# Patient Record
Sex: MALE | Race: WHITE | NOT HISPANIC OR LATINO | Employment: FULL TIME | URBAN - METROPOLITAN AREA
[De-identification: names, ages, dates, MRNs, and addresses within clinical notes are randomized per-mention and may not be internally consistent; named-entity substitution may affect disease eponyms.]

---

## 2018-07-12 ENCOUNTER — OFFICE VISIT (OUTPATIENT)
Dept: FAMILY MEDICINE | Facility: CLINIC | Age: 38
End: 2018-07-12
Payer: COMMERCIAL

## 2018-07-12 VITALS
BODY MASS INDEX: 23.86 KG/M2 | RESPIRATION RATE: 16 BRPM | HEIGHT: 72 IN | TEMPERATURE: 97.5 F | DIASTOLIC BLOOD PRESSURE: 79 MMHG | HEART RATE: 76 BPM | SYSTOLIC BLOOD PRESSURE: 126 MMHG | WEIGHT: 176.2 LBS | OXYGEN SATURATION: 92 %

## 2018-07-12 DIAGNOSIS — F33.41 RECURRENT MAJOR DEPRESSIVE DISORDER, IN PARTIAL REMISSION (H): ICD-10-CM

## 2018-07-12 DIAGNOSIS — Z00.00 ROUTINE GENERAL MEDICAL EXAMINATION AT A HEALTH CARE FACILITY: Primary | ICD-10-CM

## 2018-07-12 DIAGNOSIS — C18.7 MALIGNANT NEOPLASM OF SIGMOID COLON (H): ICD-10-CM

## 2018-07-12 DIAGNOSIS — E23.0 HYPOPITUITARISM (H): ICD-10-CM

## 2018-07-12 DIAGNOSIS — E22.0 ACROMEGALY (H): ICD-10-CM

## 2018-07-12 DIAGNOSIS — D64.9 ANEMIA, UNSPECIFIED TYPE: ICD-10-CM

## 2018-07-12 RX ORDER — DEXAMETHASONE 1 MG
TABLET ORAL
Qty: 90 TABLET | Refills: 3 | Status: SHIPPED | OUTPATIENT
Start: 2018-07-12 | End: 2019-02-08

## 2018-07-12 RX ORDER — TESTOSTERONE CYPIONATE 200 MG/ML
100 INJECTION, SOLUTION INTRAMUSCULAR WEEKLY
Qty: 10 ML | Refills: 1 | Status: SHIPPED | OUTPATIENT
Start: 2018-07-12 | End: 2019-02-08

## 2018-07-12 RX ORDER — LEVOTHYROXINE SODIUM 125 UG/1
125 TABLET ORAL DAILY
Qty: 90 TABLET | Refills: 3 | COMMUNITY
Start: 2018-07-12 | End: 2019-02-08

## 2018-07-12 NOTE — PROGRESS NOTES
Male Physical Note          HPI    Alex is a 38 year old man who presents to the clinic today to establish care. He is moving back to [place] after living in [place] for several years. He and his wife had a daughter about 6 months ago and he is employed as an .     Past medical history significant for:  1. acromegaly diagnosed in 2009  2. transphenoidal hypophysectomy performed in 2009  3. gamma knife pituitary radiosurgery in 2010  4. colon cancer diagnosed in 2011  5. sigmoid colectomy in 2011  6. second hypophysectomy in early 2016.     He is currently taking a hormone replacement regimen including dexamethasone, levothyroxine, growth hormone, and testosterone. In 2012 he was diagnosed with an irregular heart rhythm and possible regurgitation with possible cardiomegaly which was thought to be related to his diagnosis of acromegaly. After significant work up for possible heart disease his physicians told him no intervention was necessary and it was not a significant threat to Alex's health.    Last lab work performed 3 months ago.  Plans to do labs every 6 months.     Additional Previous Medical Concerns  History of cholesterol elevation.    Colonoscopy - need repeat colonoscopy in 3 - 5 years.  Last one was 1 year ago.   Irregular cardiac rhythm, cardiomegaly, possible valve disease  Anemia of unknown etiology, does not recall receiving workup for diagnosis of anemia  Recurrent major depressive disorder    Concerns today: would like to refill some medications, would like to establish a local endocrinologist nasrin at a center of excellence.    Patient Active Problem List   Diagnosis     Hypopituitarism (H)     Recurrent major depressive disorder, in partial remission (H)     Acromegaly (H)     Malignant neoplasm of sigmoid colon (H)     Routine general medical examination at a health care facility     Anemia       Past Medical History:   Diagnosis Date     Cancer (H)     Colon     Thyroid  disease     Acromegaly       Previous Medical Care   [facility]: Neurosurgery for partial transphenoidal hypophysectomy, gamma knife procedure  [facility]: Gastroenterology and Colorectal surgery for colectomy  [facility]: Original endocrinologist  [facility]: Endocrinology, Gastroenterology  [facility]: neurology, neurosurgery, gastroenterology complete transphenoidal hypophysectomy     Family History   Problem Relation Age of Onset     Rheumatoid Arthritis Mother      Rheumatoid Arthritis Other      Diabetes No family hx of      Coronary Artery Disease No family hx of      Hypertension No family hx of      Hyperlipidemia No family hx of      Cerebrovascular Disease No family hx of      Breast Cancer No family hx of      Colon Cancer No family hx of      Prostate Cancer No family hx of      Other Cancer No family hx of      Depression No family hx of      Anxiety Disorder No family hx of      Mental Illness No family hx of      Substance Abuse No family hx of      Anesthesia Reaction No family hx of      Asthma No family hx of      Osteoperosis No family hx of      Genetic Disorder No family hx of      Thyroid Disease No family hx of      Obesity No family hx of      Unknown/Adopted No family hx of               Review of Systems:     Review of Systems:    Skin: negative for, pigmentation, acne, rash, itching, bruising  Eyes: negative for, double vision, glaucoma, cataracts, redness, tearing, itching  Ears/Nose/Throat: negative for, nasal congestion, postnasal drainage, tinnitus, vertigo; some recent sore throat related to cold  Respiratory: No dyspnea on exertion, cough, or hemoptysis. Some shortness of breath related to cold.  Cardiovascular: negative for, tachycardia, irregular heart beat, chest pain and exertional chest pain or pressure  Gastrointestinal: negative for, melena, hematochezia, constipation and diarrhea  Genitourinary: negative for, urgency, hesitancy and  hematuria  Musculoskeletal: negative for, joint pain, joint swelling and joint stiffness  Neurologic: negative for, migraine headaches, headaches, syncope, seizures and memory problems  Psychiatric:noting some slight increase in MDD symptoms  Hematologic/Lymphatic/Immunologic: negative for, anemia, chills, fever and night sweats  Endocrine: negative for, thyroid disorder, heat intolerance, night sweats and diabetes; endorses some polydipsia and cold intolerance--states that he may drink more than others; Sometimes feel extremely tired during the day    Sleep:   Do you snore most or the night (as reported by a family member)? No  Do you feel sleepy or extremely tired during most of the day? Sometimes           Social History     Social History     Social History     Marital status:      Spouse name: N/A     Number of children: N/A     Years of education: N/A     Occupational History     Not on file.     Social History Main Topics     Smoking status: Never Smoker     Smokeless tobacco: Never Used     Alcohol use Yes      Comment: Only so often     Drug use: No     Sexual activity: Yes     Partners: Female     Birth control/ protection: None     Other Topics Concern     Not on file     Social History Narrative     No narrative on file       Marital Status:  Who lives in your household? Lives with wife and daughter    Has anyone hurt you physically, for example by pushing, hitting, slapping or kicking you or forcing you to have sex? Denies  Do you feel threatened or controlled by a partner, ex-partner or anyone in your life? Denies    Sexual Health     Sexual concerns: Yes   STI History: Neg      Recommended Screening     Cholesterol Level (>46 yo or at risk):  Plan at next lab draw  Colon CA Screening (>50-75 ):  Needs Q 3 years - next one is due in 2 years.     Has been 1 year since cholesterol checked; would be interested to know numbers           Physical Exam:     /79  Pulse 76  Temp 97.5  F  "(36.4  C) (Oral)  Resp 16  Ht 6' 0.44\" (184 cm)  Wt 176 lb 3.2 oz (79.9 kg)  SpO2 92%  BMI 23.61 kg/m2    BMI= There is no height or weight on file to calculate BMI.  GENERAL: healthy, alert and no distress  EYES: Eyes grossly normal to inspection, extraocular movements - intact, and PERRL  HENT: Mouth- no ulcers, no lesions. Frontal bossing, slightly enlarged nose and prominent chin  NECK: no tenderness, no adenopathy, no asymmetry, no masses, no stiffness; thyroid- normal to palpation  RESP: lungs clear to auscultation - no rales, no rhonchi, no wheezes  CV: regular rates and rhythm, normal S1 S2, no S3 or S4 and no murmur, no click or rub -  ABDOMEN: soft, no tenderness, no  hepatosplenomegaly, no masses, normal bowel sounds  MS: extremities- large hands, no edema  PSYCH: Alert and oriented times 3; speech- coherent , normal rate and volume; able to articulate logical thoughts, able to abstract reason, no tangential thoughts, no hallucinations or delusions, affect- normal    Assessment and Plan      Alex was seen today for physical and new patient.    Diagnoses and all orders for this visit:    Routine general medical examination at a health care facility    Hypopituitarism (H)  -     testosterone cypionate (DEPOTESTOTERONE) 200 MG/ML injection; Inject 0.5 mLs (100 mg) into the muscle once a week  -     dexamethasone (DECADRON) 1 MG tablet; 0.5 mg daily plus an additional 5 doses per month as needed     Alex is a 38 year old male  who presents to [facility] today to establish care. He has extensive past medical history involving acromegaly diagnosed in 2009, multiple surgeries to treat primary hyperpituitarism, and a diagnosis of colon cancer (single neoplastic polyp with lymphatic spread) in 2011 treated by sigmoidectomy. 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 He was also diagnosed " with abnormal cardiac arrhythmia and mild cardiomegaly thought to be related to elevated growth hormone. Alex is knowledgeable about his condition, very health literate, and has proven capable of managing his health conditions well. Today he provided [facility] with his medical history, received a routine physical and health evaluation, and we refilled several of his testosterone and dexamethasone prescriptions.    Note - his T levels may be a bit lower (mood and some ED) - did not address today but can do so over MyChart if ongoing concern.     Plan  1. Referral to local endocrinologist  Alex expressed interest in being referred to endocrinology, requesting a local practice of strong repute especially a center of excellence. Plan to research possible options and provide referral.    2. Work with Alex to obtain previous medical records from previous medical providers listed above    3. Return for routine labs in 3 months    Options for treatment and follow-up care were reviewed with the patient. Ivan D Welander and/or guardian engaged in the decision making process and verbalized understanding of the options discussed and agreed with the final plan.    Kavon Warren MS3    Preceptor Attestation:  I was present with the medical student who participated in the service and in the documentation of this note. I have verified the history and personally performed the physical exam and medical decision making. I have verified the content of the note, which accurately reflects my assessment of the patient and the plan of care.   Supervising Physician:  Sabine Stinson MD

## 2018-07-12 NOTE — PATIENT INSTRUCTIONS
Here is the plan from today's visit    1. Routine general medical examination at a health care facility  Plan to add on lipid test in the future    2. Hypopituitarism (H)  I will get back to you on recommendations for endocrine referral locally    - testosterone cypionate (DEPOTESTOTERONE) 200 MG/ML injection; Inject 0.5 mLs (100 mg) into the muscle once a week  Dispense: 10 mL; Refill: 1  - levothyroxine (SYNTHROID/LEVOTHROID) 125 MCG tablet; Take 1 tablet (125 mcg) by mouth daily  Dispense: 90 tablet; Refill: 3  - Somatropin 5 MG/2ML SOLN; Inject 0.5 mg Subcutaneous daily  Dispense: 2 mL; Refill: 3  - dexamethasone (DECADRON) 1 MG tablet; 0.5 mg daily plus an additional 5 doses per month as needed  Dispense: 90 tablet; Refill: 3      Please call or return to clinic if your symptoms don't go away.    Follow up plan  As needed     Thank you for coming to [facility] today.  Lab Testing:  **If you had lab testing today and your results are reassuring or normal they will be mailed to you or sent through [facility] within 7 days.   **If the lab tests need quick action we will call you with the results.  The phone number we will call with results is # [phone] (home) [phone] (work). If this is not the best number please call our clinic and change the number.  Medication Refills:  If you need any refills please call your pharmacy and they will contact us.   If you need to  your refill at a new pharmacy, please contact the new pharmacy directly. The new pharmacy will help you get your medications transferred faster.   Scheduling:  If you have any concerns about today's visit or wish to schedule another appointment please call our office during normal business hours [phone] (8-5:00 M-F)  If a referral was made to a [facility] and you don't get a call from central scheduling please call [phone].  If a Mammogram was ordered for you at [facility] call [phone] to  schedule or change your appointment.  If you had an XRay/CT/Ultrasound/MRI ordered the number is [phone] to schedule or change your radiology appointment.   Medical Concerns:  If you have urgent medical concerns please call [phone] at any time of the day.  If you have a medical emergency please call [phone].  Preventive Health Recommendations  Male Ages 26 - 39    Yearly exam:             See your health care provider every year in order to  o   Review health changes.   o   Discuss preventive care.    o   Review your medicines if your doctor has prescribed any.    You should be tested each year for STDs (sexually transmitted diseases), if you re at risk.     After age 35, talk to your provider about cholesterol testing. If you are at risk for heart disease, have your cholesterol tested at least every 5 years.     If you are at risk for diabetes, you should have a diabetes test (fasting glucose).  Shots: Get a flu shot each year. Get a tetanus shot every 10 years.     Nutrition:    Eat at least 5 servings of fruits and vegetables daily.     Eat whole-grain bread, whole-wheat pasta and brown rice instead of white grains and rice.     Get adequate Calcium and Vitamin D.     Lifestyle    Exercise for at least 150 minutes a week (30 minutes a day, 5 days a week). This will help you control your weight and prevent disease.     Limit alcohol to one drink per day.     No smoking.     Wear sunscreen to prevent skin cancer.     See your dentist every six months for an exam and cleaning.

## 2018-07-12 NOTE — MR AVS SNAPSHOT
After Visit Summary   7/12/2018    Ivan D Welander    MRN: [id number]           Patient Information     Date Of Birth          1980        Visit Information        Provider Department      7/12/2018 3:40 PM Sabine Stinson MD [facility]        Today's Diagnoses     Routine general medical examination at a health care facility    -  1    Hypopituitarism (H)          Care Instructions    Here is the plan from today's visit    1. Routine general medical examination at a health care facility  Plan to add on lipid test in the future    2. Hypopituitarism (H)  I will get back to you on recommendations for endocrine referral locally    - testosterone cypionate (DEPOTESTOTERONE) 200 MG/ML injection; Inject 0.5 mLs (100 mg) into the muscle once a week  Dispense: 10 mL; Refill: 1  - levothyroxine (SYNTHROID/LEVOTHROID) 125 MCG tablet; Take 1 tablet (125 mcg) by mouth daily  Dispense: 90 tablet; Refill: 3  - Somatropin 5 MG/2ML SOLN; Inject 0.5 mg Subcutaneous daily  Dispense: 2 mL; Refill: 3  - dexamethasone (DECADRON) 1 MG tablet; 0.5 mg daily plus an additional 5 doses per month as needed  Dispense: 90 tablet; Refill: 3      Please call or return to clinic if your symptoms don't go away.    Follow up plan  As needed     Thank you for coming to [facility] today.  Lab Testing:  **If you had lab testing today and your results are reassuring or normal they will be mailed to you or sent through [facility] within 7 days.   **If the lab tests need quick action we will call you with the results.  The phone number we will call with results is # [phone] (home) [phone] (work). If this is not the best number please call our clinic and change the number.  Medication Refills:  If you need any refills please call your pharmacy and they will contact us.   If you need to  your refill at a new pharmacy, please contact the new pharmacy directly. The new pharmacy will help you get your  medications transferred faster.   Scheduling:  If you have any concerns about today's visit or wish to schedule another appointment please call our office during normal business hours [phone] (8-5:00 M-F)  If a referral was made to a [facility] and you don't get a call from central scheduling please call [phone].  If a Mammogram was ordered for you at [facility] call [phone] to schedule or change your appointment.  If you had an XRay/CT/Ultrasound/MRI ordered the number is [phone] to schedule or change your radiology appointment.   Medical Concerns:  If you have urgent medical concerns please call [phone] at any time of the day.  If you have a medical emergency please call 180.  Preventive Health Recommendations  Male Ages 26 - 39    Yearly exam:             See your health care provider every year in order to  o   Review health changes.   o   Discuss preventive care.    o   Review your medicines if your doctor has prescribed any.    You should be tested each year for STDs (sexually transmitted diseases), if you re at risk.     After age 35, talk to your provider about cholesterol testing. If you are at risk for heart disease, have your cholesterol tested at least every 5 years.     If you are at risk for diabetes, you should have a diabetes test (fasting glucose).  Shots: Get a flu shot each year. Get a tetanus shot every 10 years.     Nutrition:    Eat at least 5 servings of fruits and vegetables daily.     Eat whole-grain bread, whole-wheat pasta and brown rice instead of white grains and rice.     Get adequate Calcium and Vitamin D.     Lifestyle    Exercise for at least 150 minutes a week (30 minutes a day, 5 days a week). This will help you control your weight and prevent disease.     Limit alcohol to one drink per day.     No smoking.     Wear sunscreen to prevent skin cancer.     See your dentist every six months for an exam and cleaning.              "Follow-ups after your visit        Who to contact     Please call your clinic at [phone] to:    Ask questions about your health    Make or cancel appointments    Discuss your medicines    Learn about your test results    Speak to your doctor            Additional Information About Your Visit        [facility] Information     [employer] is an electronic gateway that provides easy, online access to your medical records. With [employer], you can request a clinic appointment, read your test results, renew a prescription or communicate with your care team.     To sign up for [employer] visit the website at [email or web address]   You will be asked to enter the access code listed below, as well as some personal information. Please follow the directions to create your username and password.     Your access code is: [email or web address]  Expires: 10/10/2018  3:30 PM     Your access code will  in 90 days. If you need help or a new code, please contact your [facility] or call [phone] for assistance.        Care EveryWhere ID     This is your Care EveryWhere ID. This could be used by other organizations to access your [place] medical records  [id number]        Your Vitals Were     Pulse Temperature Respirations Height Pulse Oximetry BMI (Body Mass Index)    76 97.5  F (36.4  C) (Oral) 16 6' 0.44\" (184 cm) 92% 23.61 kg/m2       Blood Pressure from Last 3 Encounters:   18 126/79    Weight from Last 3 Encounters:   18 176 lb 3.2 oz (79.9 kg)              Today, you had the following     No orders found for display         Today's Medication Changes          These changes are accurate as of 18  5:20 PM.  If you have any questions, ask your nurse or doctor.               Start taking these medicines.        Dose/Directions    dexamethasone 1 MG tablet   Commonly known as:  DECADRON   Used for:  Hypopituitarism (H)   Started by:  Sabine Stinson MD        0.5 mg daily plus an " additional 5 doses per month as needed   Quantity:  90 tablet   Refills:  3       testosterone cypionate 200 MG/ML injection   Commonly known as:  DEPOTESTOTERONE   Used for:  Hypopituitarism (H)   Started by:  Sabine Stinson MD        Dose:  100 mg   Inject 0.5 mLs (100 mg) into the muscle once a week   Quantity:  10 mL   Refills:  1            Where to get your medicines      These medications were sent to [facility] - [address]  [address] [place] [id number]     Phone:  [phone]     dexamethasone 1 MG tablet         Some of these will need a paper prescription and others can be bought over the counter.  Ask your nurse if you have questions.     Bring a paper prescription for each of these medications     testosterone cypionate 200 MG/ML injection                Primary Care Provider Fax #    Physician No Ref-Primary [phone]       No address on file        Equal Access to Services     [facility] ANTONIO [facility] : Mike Trent, ky morales, dave sánchez, isidoro taylor . So [facility] [place] [phone].    ATENCIÓN: Si habla español, tiene a shin disposición servicios gratuitos de asistencia lingüística. Llame al [phone].    We comply with applicable federal civil rights laws and [place] laws. We do not discriminate on the basis of race, color, national origin, age, disability, sex, sexual orientation, or gender identity.            Thank you!     Thank you for choosing [facility]  for your care. Our goal is always to provide you with excellent care. Hearing back from our patients is one way we can continue to improve our services. Please take a few minutes to complete the written survey that you may receive in the mail after your visit with us. Thank you!             Your Updated Medication List - Protect others around you: Learn how to safely use, store and throw away your medicines  at www.disposemymeds.org.          This list is accurate as of 7/12/18  5:20 PM.  Always use your most recent med list.                   Brand Name Dispense Instructions for use Diagnosis    dexamethasone 1 MG tablet    DECADRON    90 tablet    0.5 mg daily plus an additional 5 doses per month as needed    Hypopituitarism (H)       levothyroxine 125 MCG tablet    SYNTHROID/LEVOTHROID    90 tablet    Take 1 tablet (125 mcg) by mouth daily    Hypopituitarism (H)       Somatropin 5 MG/2ML Soln     2 mL    Inject 0.5 mg Subcutaneous daily    Hypopituitarism (H)       testosterone cypionate 200 MG/ML injection    DEPOTESTOTERONE    10 mL    Inject 0.5 mLs (100 mg) into the muscle once a week    Hypopituitarism (H)

## 2018-07-12 NOTE — Clinical Note
Hello:  I have a new patient who has acromegaly and now pan hypopituitarism, who is looking for a specialist in acromegaly.  I offered the U, which he is fine with but was hoping to work with the provider in the [facility] who is most knowledgeable in this area. Who would you recommend?  Love to keep him in the system but also want to honor his request. Thanks,  Elsa Stinson MD

## 2018-07-13 ASSESSMENT — PATIENT HEALTH QUESTIONNAIRE - PHQ9: SUM OF ALL RESPONSES TO PHQ QUESTIONS 1-9: 4

## 2018-07-13 NOTE — PROGRESS NOTES
Preceptor Attestation:  I was present with the medical student who participated in the service and in the documentation of this note. I have verified the history and personally performed the physical exam and medical decision making. I have verified the content of the note, which accurately reflects my assessment of the patient and the plan of care.   Supervising Physician:  Sabine Stinson MD

## 2018-07-25 ENCOUNTER — MYC MEDICAL ADVICE (OUTPATIENT)
Dept: FAMILY MEDICINE | Facility: CLINIC | Age: 38
End: 2018-07-25

## 2018-07-25 DIAGNOSIS — E23.0 HYPOPITUITARISM (H): Primary | ICD-10-CM

## 2018-07-25 DIAGNOSIS — E22.0 ACROMEGALY (H): ICD-10-CM

## 2018-08-02 ENCOUNTER — TELEPHONE (OUTPATIENT)
Dept: ENDOCRINOLOGY | Facility: CLINIC | Age: 38
End: 2018-08-02

## 2018-08-02 NOTE — TELEPHONE ENCOUNTER
Health Call Center    Phone Message    May a detailed message be left on voicemail: yes    Reason for Call: Other: Pt is being referred by Dr. Sabine Stinson at [facility] to Dr. Tejada for acromegally and hypopituitarism. Referral is in the system. Please review and contact pt to schedule. Thanks!     Action Taken: Message routed to:  [facility] Endocrine

## 2018-10-02 ENCOUNTER — OFFICE VISIT (OUTPATIENT)
Dept: ENDOCRINOLOGY | Facility: CLINIC | Age: 38
End: 2018-10-02
Payer: COMMERCIAL

## 2018-10-02 VITALS
SYSTOLIC BLOOD PRESSURE: 112 MMHG | HEART RATE: 64 BPM | BODY MASS INDEX: 24.81 KG/M2 | DIASTOLIC BLOOD PRESSURE: 73 MMHG | WEIGHT: 185.2 LBS

## 2018-10-02 DIAGNOSIS — E03.8 SECONDARY HYPOTHYROIDISM: ICD-10-CM

## 2018-10-02 DIAGNOSIS — D49.7 PITUITARY TUMOR: ICD-10-CM

## 2018-10-02 DIAGNOSIS — E22.0 ACROMEGALY (H): ICD-10-CM

## 2018-10-02 DIAGNOSIS — E27.49 SECONDARY ADRENAL INSUFFICIENCY (H): ICD-10-CM

## 2018-10-02 DIAGNOSIS — E23.0 HYPOPITUITARISM (H): ICD-10-CM

## 2018-10-02 DIAGNOSIS — E29.1 SECONDARY MALE HYPOGONADISM: ICD-10-CM

## 2018-10-02 DIAGNOSIS — E23.0 HYPOPITUITARISM (H): Primary | ICD-10-CM

## 2018-10-02 LAB
ALBUMIN SERPL-MCNC: 4 G/DL (ref 3.4–5)
ALP SERPL-CCNC: 45 U/L (ref 40–150)
ALT SERPL W P-5'-P-CCNC: 23 U/L (ref 0–70)
ANION GAP SERPL CALCULATED.3IONS-SCNC: 6 MMOL/L (ref 3–14)
AST SERPL W P-5'-P-CCNC: 26 U/L (ref 0–45)
BILIRUB SERPL-MCNC: 0.9 MG/DL (ref 0.2–1.3)
BUN SERPL-MCNC: 8 MG/DL (ref 7–30)
CALCIUM SERPL-MCNC: 8.8 MG/DL (ref 8.5–10.1)
CHLORIDE SERPL-SCNC: 99 MMOL/L (ref 94–109)
CO2 SERPL-SCNC: 29 MMOL/L (ref 20–32)
CREAT SERPL-MCNC: 0.95 MG/DL (ref 0.66–1.25)
GFR SERPL CREATININE-BSD FRML MDRD: 88 ML/MIN/1.7M2
GLUCOSE SERPL-MCNC: 79 MG/DL (ref 70–99)
POTASSIUM SERPL-SCNC: 3.6 MMOL/L (ref 3.4–5.3)
PROLACTIN SERPL-MCNC: 11 UG/L (ref 2–18)
PROT SERPL-MCNC: 7.5 G/DL (ref 6.8–8.8)
SODIUM SERPL-SCNC: 135 MMOL/L (ref 133–144)
T4 FREE SERPL-MCNC: 1.17 NG/DL (ref 0.76–1.46)
TSH SERPL DL<=0.005 MIU/L-ACNC: 0.01 MU/L (ref 0.4–4)

## 2018-10-02 ASSESSMENT — PAIN SCALES - GENERAL: PAINLEVEL: NO PAIN (0)

## 2018-10-02 NOTE — LETTER
10/2/2018       RE: Ivan D Welander  [address]  [facility] [address]     Dear Colleague,    Thank you for referring your patient, Ivan D Welander, to the [facility] ENDOCRINOLOGY at [facility]. Please see a copy of my visit note below.                                                                               - Endocrinology Initial Consultation -    Reason for visit/consult:  Data Unavailable    Primary care provider: No Ref-Primary, Physician    HPI: A 37 yo male with acromegaly, here for the care transition due to interstate move.  He moved in 7/2018 from [place], originally from [place].    He was diagnosed Acromegaly in 2007, during the regular physical check up in [facility], and screened and diagnosed. Patient recalls that his pituitary tumor was 1 inch or so.   In 12/2009 first surgery was done at [facility], RT was done after that in [place] 6/2010.   2/2011 he underwent colonoscopy for Colon cancer screening, and results positive for malignancy, then underwent sigmoid resection.     Patient moved to [place], residual tumor detected. his second surgery 12/2015 at [facility] for residual tumor.     Patient has been taking Dexamethasone, testosterone, GH for his panhypopituitarism started since 2010. He used to be on Hydrocortisone, however tends to forgets afternoon dose, then switched to dexamethasone 0.5 mg in [place] due to missing dose.   Levothyroxine 125 mcg. Testosterone injection every week 100 mg.   Growth hormone (neutropin) 0.5 mg daily since 2017.   In [place] 2018, since he was missing dexamethasone for busy baby care, he went to [facility] for sickness.     Last visit 3/20/2018 [facility] Neurosurgery Dr. Parada visit for GHD.     4/2017 colonoscoy: OK,     Past Medical/Surgical History:  Past Medical History:   Diagnosis Date     Cancer (H)     Colon     Thyroid disease     Acromegaly     Past Surgical History:   Procedure Laterality Date      APPENDECTOMY       COLONOSCOPY       GI SURGERY      sigmoid colectomy     HEAD & NECK SURGERY      transphenoidal hypophysectomy       Allergies:  No Known Allergies    Current Medications   Current Outpatient Prescriptions   Medication     dexamethasone (DECADRON) 1 MG tablet     levothyroxine (SYNTHROID/LEVOTHROID) 125 MCG tablet     Sildenafil Citrate (VIAGRA PO)     Somatropin 5 MG/2ML SOLN     testosterone cypionate (DEPOTESTOTERONE) 200 MG/ML injection     No current facility-administered medications for this visit.        Family History:  Family History   Problem Relation Age of Onset     Rheumatoid Arthritis Mother      Rheumatoid Arthritis Other      Diabetes No family hx of      Coronary Artery Disease No family hx of      Hypertension No family hx of      Hyperlipidemia No family hx of      Cerebrovascular Disease No family hx of      Breast Cancer No family hx of      Colon Cancer No family hx of      Prostate Cancer No family hx of      Other Cancer No family hx of      Depression No family hx of      Anxiety Disorder No family hx of      Mental Illness No family hx of      Substance Abuse No family hx of      Anesthesia Reaction No family hx of      Asthma No family hx of      Osteoporosis No family hx of      Genetic Disorder No family hx of      Thyroid Disease No family hx of      Obesity No family hx of      Unknown/Adopted No family hx of    Maternal uncle: some acromegalic feature    Social History:  Social History   Substance Use Topics     Smoking status: Never Smoker     Smokeless tobacco: Never Used     Alcohol use Yes      Comment: Only so often       ROS:  Full review of systems taken with the help of the intake sheet. Otherwise a complete 14 point review of systems was taken and is negative unless stated in the history above.      Physical Exam:   Blood pressure 112/73, pulse 64, weight 84 kg (185 lb 3.2 oz).  General: well appearing, no acute distress, pleasant and conversant,   Mental  Status/neuro: alert and oriented  Face: symmetrical, normal facial color  Eyes: anicteric, PERRL, no proptosis or lid lag  Neck: suppler, no lymphadenopahty  Thyroid: normal size and texture, no nodule palpable, no bruits  Hand/finger: enlarged (per patient was bigger before and his ring recently loosened and fell)  Heart: regular rhythm, S1S2, no murmur appreciated  Chest: no gynecomastia, skin tags several +  Lung: clear to auscultation bilaterally  Abdomen: soft, NT/ND, no hepatomegaly  Legs: no swelling or edema      Labs : I reviewed data from epic and extract and summarize the pertinent data here.               1/2018  TSH   0.02  Cortisol 5.1    MRI Brain: I personally reviewed the original images and agree with the below reports.   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 Significant interval debulking of predominantly inferior and right sellar enhancing soft tissue.  Residual enhancing sellar tissue which may reflect residual normal pituitary gland or tumor. Infundibulum is midline. No new areas of suspicious enhancing tissue.    Unremarkable appearance of the optic nerves and chiasm.      Visualized portions of the brain are unremarkable.        Assessment and Plan  38 year old male with acromegaly s/p TSS twice, RT, long term panhypopituitarism    # Acromegaly  Post TSS twice, last MRI 2/2016 post surgical changes.  We will repeat MRI since has been almost 3 years. If no residual then every 3 years or so.   - MRI pituitary in  6 months    # Secondary adrenal insufficiency  Discussed hydrocortisone is preferable to dexamethasone and discussed glucocorticoid and mineral corticoid.   Since we have some changes of other meds, stay Dexamethasone 0.5 mg daily for now the will try hydrocortisone 10/5 mg in the new future.     # Secondary hypothryoidism  - Blood work today (TSH, free T4)  - Meanwhile continue LT4 125 mcg for now    # Secondary hypogonadism    - Blood work in the morning testosterone, LH, FSH  - Meanwhile continue curernt testosterone injection 100 mg every week     # GH deficiecy  Patient mentioned GH started 1 year ago which he did not feel difference (such as energy level), in addition he has history of colon cancer, which can be against GH therapy. Discussed with patient, will try to hold GH injection this time and observe.   - Hold GH for 1-2 motnh    - RTC with me in 6 month then once a year.     I spent 45 minutes with this patient face to face and explained the conditions and plans (more than 50% of time was counseling/coordination of care, expalined difference between dexamethasone and hydrocortisone, discussed blood work plan and MRI follow up, discussed association colon cancer and GH) . The patient understood and is satisfied with today's visit. Return to clinic with me in 6 months.         Damaris Tejada MD  Staff Physician  Endocrinology and Metabolism  License: [id number]    Again, thank you for allowing me to participate in the care of your patient.      Sincerely,    Damaris Tejada MD

## 2018-10-02 NOTE — LETTER
Date:October 4, 2018      Patient was self referred, no letter generated. Do not send.        [facility] Health Information

## 2018-10-02 NOTE — MR AVS SNAPSHOT
After Visit Summary   10/2/2018    Ivan D Welander    MRN: [id number]           Patient Information     Date Of Birth          1980        Visit Information        Provider Department      10/2/2018 12:00 PM Damaris Tejada MD [facility] Endocrinology        Today's Diagnoses     Hypopituitarism (H)    -  1    Acromegaly (H)        Secondary male hypogonadism        Secondary adrenal insufficiency (H)        Secondary hypothyroidism        Pituitary tumor           Follow-ups after your visit        Follow-up notes from your care team     Return in about 6 months (around 4/2/2019).      Your next 10 appointments already scheduled     Apr 02, 2019  1:00 PM CDT   (Arrive by 12:45 PM)   RETURN ENDOCRINE with Damaris Tejada MD   [facility] Endocrinology ([facility])    [address] [place] [id number] [phone]              Future tests that were ordered for you today     Open Future Orders        Priority Expected Expires Ordered    Testosterone total Routine  10/2/2019 10/2/2018    Lutropin Routine  10/2/2019 10/2/2018    Follicle stimulating hormone Routine  10/2/2019 10/2/2018    MRI Brain-PITUITARY  w & w/o contrast Routine  4/30/2019 10/2/2018            Who to contact     Please call your clinic at [phone] to:    Ask questions about your health    Make or cancel appointments    Discuss your medicines    Learn about your test results    Speak to your doctor            Additional Information About Your Visit        MyChart Information     [employer] gives you secure access to your electronic health record. If you see a primary care provider, you can also send messages to your care team and make appointments. If you have questions, please call your primary care clinic.  If you do not have a primary care provider, please call [phone] and they will assist you.      aWhere is an electronic gateway that provides easy, online access to your  medical records. With [facility], you can request a clinic appointment, read your test results, renew a prescription or communicate with your care team.     To access your existing account, please contact your [facility] or call [phone] for assistance.        Care EveryWhere ID     This is your Care EveryWhere ID. This could be used by other organizations to access your [facility] medical records  [id number]        Your Vitals Were     Pulse BMI (Body Mass Index)                64 24.81 kg/m2           Blood Pressure from Last 3 Encounters:   10/02/18 112/73   07/12/18 126/79    Weight from Last 3 Encounters:   10/02/18 84 kg (185 lb 3.2 oz)   07/12/18 79.9 kg (176 lb 3.2 oz)               Primary Care Provider Fax #    Physician No Ref-Primary [phone]       No address on file        Equal Access to Services     JOSÉ ANTONIO WOODRUFF : Hadii pako Trent, waaxnik luqadaha, qaybta kaalmada gonzalo, isidoro taylor . So [facility] [phone].    ATENCIÓN: Si habla español, tiene a shin disposición servicios gratuitos de asistencia lingüística. Llame al [phone].    We comply with applicable federal civil rights laws and [place] laws. We do not discriminate on the basis of race, color, national origin, age, disability, sex, sexual orientation, or gender identity.            Thank you!     Thank you for choosing [facility]  for your care. Our goal is always to provide you with excellent care. Hearing back from our patients is one way we can continue to improve our services. Please take a few minutes to complete the written survey that you may receive in the mail after your visit with us. Thank you!             Your Updated Medication List - Protect others around you: Learn how to safely use, store and throw away your medicines at www.disposemymeds.org.          This list is accurate as of 10/2/18  3:26 PM.  Always use your most recent med  list.                   Brand Name Dispense Instructions for use Diagnosis    dexamethasone 1 MG tablet    DECADRON    90 tablet    0.5 mg daily plus an additional 5 doses per month as needed    Hypopituitarism (H)       levothyroxine 125 MCG tablet    SYNTHROID/LEVOTHROID    90 tablet    Take 1 tablet (125 mcg) by mouth daily    Hypopituitarism (H)       Somatropin 5 MG/2ML Soln     2 mL    Inject 0.5 mg Subcutaneous daily    Hypopituitarism (H)       testosterone cypionate 200 MG/ML injection    DEPOTESTOSTERONE    10 mL    Inject 0.5 mLs (100 mg) into the muscle once a week    Hypopituitarism (H)       VIAGRA PO

## 2018-10-02 NOTE — PROGRESS NOTES
- Endocrinology Initial Consultation -    Reason for visit/consult:  Data Unavailable    Primary care provider: No Ref-Primary, Physician    HPI: A 37 yo male with acromegaly, here for the care transition due to interstate move.  He moved in 7/2018 from [place], originally from [place].    He was diagnosed Acromegaly in 2007, during the regular physical check up in [facility], and screened and diagnosed. Patient recalls that his pituitary tumor was 1 inch or so.   In 12/2009 first surgery was done at [facility], RT was done after that in [place] 6/2010.   2/2011 he underwent colonoscopy for Colon cancer screening, and results positive for malignancy, then underwent sigmoid resection.     Patient moved to [place], residual tumor detected. his second surgery 12/2015 at [facility] for residual tumor.     Patient has been taking Dexamethasone, testosterone, GH for his panhypopituitarism started since 2010. He used to be on Hydrocortisone, however tends to forgets afternoon dose, then switched to dexamethasone 0.5 mg in [place] due to missing dose.   Levothyroxine 125 mcg. Testosterone injection every week 100 mg.   Growth hormone (neutropin) 0.5 mg daily since 2017.   [facility] since he was missing dexamethasone for busy baby care, he went to [facility] for sickness.     Last visit 3/20/2018 [facility] Neurosurgery Dr. Parada visit for GHD.     4/2017 colonoscoy: OK,     Past Medical/Surgical History:  Past Medical History:   Diagnosis Date     Cancer (H)     Colon     Thyroid disease     Acromegaly     Past Surgical History:   Procedure Laterality Date     APPENDECTOMY       COLONOSCOPY       GI SURGERY      sigmoid colectomy     HEAD & NECK SURGERY      transphenoidal hypophysectomy       Allergies:  No Known Allergies    Current Medications   Current Outpatient Prescriptions   Medication     dexamethasone (DECADRON) 1 MG tablet      levothyroxine (SYNTHROID/LEVOTHROID) 125 MCG tablet     Sildenafil Citrate (VIAGRA PO)     Somatropin 5 MG/2ML SOLN     testosterone cypionate (DEPOTESTOTERONE) 200 MG/ML injection     No current facility-administered medications for this visit.        Family History:  Family History   Problem Relation Age of Onset     Rheumatoid Arthritis Mother      Rheumatoid Arthritis Other      Diabetes No family hx of      Coronary Artery Disease No family hx of      Hypertension No family hx of      Hyperlipidemia No family hx of      Cerebrovascular Disease No family hx of      Breast Cancer No family hx of      Colon Cancer No family hx of      Prostate Cancer No family hx of      Other Cancer No family hx of      Depression No family hx of      Anxiety Disorder No family hx of      Mental Illness No family hx of      Substance Abuse No family hx of      Anesthesia Reaction No family hx of      Asthma No family hx of      Osteoporosis No family hx of      Genetic Disorder No family hx of      Thyroid Disease No family hx of      Obesity No family hx of      Unknown/Adopted No family hx of    Maternal uncle: some acromegalic feature    Social History:  Social History   Substance Use Topics     Smoking status: Never Smoker     Smokeless tobacco: Never Used     Alcohol use Yes      Comment: Only so often       ROS:  Full review of systems taken with the help of the intake sheet. Otherwise a complete 14 point review of systems was taken and is negative unless stated in the history above.      Physical Exam:   Blood pressure 112/73, pulse 64, weight 84 kg (185 lb 3.2 oz).  General: well appearing, no acute distress, pleasant and conversant,   Mental Status/neuro: alert and oriented  Face: symmetrical, normal facial color  Eyes: anicteric, PERRL, no proptosis or lid lag  Neck: suppler, no lymphadenopahty  Thyroid: normal size and texture, no nodule palpable, no bruits  Hand/finger: enlarged (per patient was bigger before and his  ring recently loosened and fell)  Heart: regular rhythm, S1S2, no murmur appreciated  Chest: no gynecomastia, skin tags several +  Lung: clear to auscultation bilaterally  Abdomen: soft, NT/ND, no hepatomegaly  Legs: no swelling or edema      Labs : I reviewed data from epic and extract and summarize the pertinent data here.               1/2018  TSH   0.02  Cortisol 5.1    MRI Brain: I personally reviewed the original images and agree with the below reports.   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 Significant interval debulking of predominantly inferior and right sellar enhancing soft tissue.  Residual enhancing sellar tissue which may reflect residual normal pituitary gland or tumor. Infundibulum is midline. No new areas of suspicious enhancing tissue.    Unremarkable appearance of the optic nerves and chiasm.      Visualized portions of the brain are unremarkable.        Assessment and Plan  38 year old male with acromegaly s/p TSS twice, RT, long term panhypopituitarism    # Acromegaly  Post TSS twice, last MRI 2/2016 post surgical changes.  We will repeat MRI since has been almost 3 years. If no residual then every 3 years or so.   - MRI pituitary in 6 months    # Secondary adrenal insufficiency  Discussed hydrocortisone is preferable to dexamethasone and discussed glucocorticoid and mineral corticoid.   Since we have some changes of other meds, stay Dexamethasone 0.5 mg daily for now the will try hydrocortisone 10/5 mg in the  new future.     # Secondary hypothryoidism  - Blood work today (TSH, free T4)  - Meanwhile continue LT4 125 mcg for now    # Secondary hypogonadism    - Blood work in the morning testosterone, LH, FSH  - Meanwhile continue curernt testosterone injection 100 mg every week     # GH deficiecy  Patient mentioned GH started 1 year ago which he did not feel difference (such as energy level), in addition he has history of colon cancer, which can be against GH therapy. Discussed with patient, will try to hold GH injection this time and observe.   - Hold GH for 1-2 motnh    - RTC with me in 6 month then once a year.     I spent 45 minutes with this patient face to face and explained the conditions and plans (more than 50% of time was counseling/coordination of care, expalined difference between dexamethasone and hydrocortisone, discussed blood work plan and MRI follow up, discussed association colon cancer and GH) . The patient understood and is satisfied with today's visit. Return to clinic with me in 6 months.         Damaris Tejada MD  Staff Physician  Endocrinology and Metabolism  License: [id number]

## 2018-10-04 NOTE — PROGRESS NOTES
Dear Alex     Here is your results. Dose of thyroid medication is perfect for now.  Please continue the plan we discussed.  Please call nursing line at [phone] if you have any questions.    Take care  Damaris Tejada MD

## 2018-10-05 LAB — IGF-I BLD-MCNC: 153 NG/ML (ref 83–238)

## 2018-10-17 NOTE — PROGRESS NOTES
Dear Alex     Here is your results. IGF1 is also within normal limits on 10/2/2018.  Please try the plan we discussed.  Please call nursing line at [phone] if you have any questions.    Take care  Damaris Tejada MD

## 2018-12-20 ENCOUNTER — ANCILLARY PROCEDURE (OUTPATIENT)
Dept: MRI IMAGING | Facility: CLINIC | Age: 38
End: 2018-12-20
Attending: INTERNAL MEDICINE
Payer: COMMERCIAL

## 2018-12-20 DIAGNOSIS — E22.0 ACROMEGALY (H): ICD-10-CM

## 2018-12-20 RX ORDER — GADOBUTROL 604.72 MG/ML
7.5 INJECTION INTRAVENOUS ONCE
Status: COMPLETED | OUTPATIENT
Start: 2018-12-20 | End: 2018-12-20

## 2018-12-20 RX ADMIN — GADOBUTROL 7.5 ML: 604.72 INJECTION INTRAVENOUS at 17:48

## 2018-12-20 NOTE — LETTER
Patient:  Ivan D Welander  :   1980  MRN:     [id number]          Mr.Ivan D Welander  [address]  [facility] [address]        2018    Dear Mr.Welander,    We are writing to inform you of your test results. Pos surgical scar only, otherwise looks great.     Please take care    Damaris Tejada MD      Resulted Orders   MRI Brain-PITUITARY  w & w/o contrast    Narrative    Brain/ Pituitary MRI without and with contrast    History: Acromegaly (H). 38 year old male with?acromegaly s/p TSS  twice, RT, long term panhypopituitarism. Post TSS  last MRI 2016  post surgical changes (no prior images available.)    ICD-10: Acromegaly (H)    Comparison:  None     Technique: Axial diffusion and FLAIR images of the whole brain  obtained without intravenous contrast. Sagittal T1 and T2-weighted,  coronal T2-weighted, coronal T1-weighted images with focus on the  sella were obtained without intravenous contrast. Post intravenous  contrast using gadolinium coronal and sagittal T1-weighted images were  obtained focused on the sella. Dynamic postcontrast coronal  T1-weighted images were also obtained.    Contrast: 7.5 mL Gadavist    Findings:      Postsurgical changes of transsphenoidal surgery. There are 2 foci of  T1 hyperintensity in the sella turcica without evidence of enhancement  (series 100, image 11). Few scattered punctate white matter T2  hyperintense foci.    No mass is demonstrated within the sella. The pituitary stalk appears  midline. The optic chiasm appears intact and not displaced.  The major cavernous carotid vascular flow-voids appear patent.      FLAIR images through the whole brain are unremarkable, and demonstrate  no mass effect, midline shift, or significant enlargement of the  ventricles. Small mucous retention cysts in the left and right  maxillary sinuses.      Impression    Impression:  1. Postsurgical changes of transsphenoidal resection. No evidence of  recurrence.  2. Few  scattered punctate white matter T2 hyperintense foci,  nonspecific.    I have personally reviewed the examination and initial interpretation  and I agree with the findings.    CARSON ROGERS MD       [facility] MRI ROOM 1

## 2018-12-21 NOTE — DISCHARGE INSTRUCTIONS
MRI Contrast Discharge Instructions    The IV contrast you received today will pass out of your body in your  urine. This will happen in the next 24 hours. You will not feel this process.  Your urine will not change color.    Drink at least 4 extra glasses of water or juice today (unless your doctor  has restricted your fluids). This reduces the stress on your kidneys.  You may take your regular medicines.    If you are on dialysis: It is best to have dialysis today.    If you have a reaction: Most reactions happen right away. If you have  any new symptoms after leaving the hospital (such as hives or swelling),  call your hospital at the correct number below. Or call your family doctor.  If you have breathing distress or wheezing, call 911.    Special instructions: ***    I have read and understand the above information.    Signature:______________________________________ Date:___________    Staff:__________________________________________ Date:___________     Time:__________    [facility] Departments:    ___[place]: [phone]  ___[facility]: [phone]  ___[place]: [phone] ___[facility]: [phone]  ___[facility]: [phone]  ___[facility]: [phone]  ___[phone]  ___[facility]: [phone]  ___[phone]

## 2018-12-27 NOTE — RESULT ENCOUNTER NOTE
Dear Alex     Here is your results. MRI Post surgical scar, otherwise looks great.  Please call nursing line at [phone] if you have any questions.    Take care  Damaris Tejada MD

## 2019-02-07 DIAGNOSIS — E23.0 HYPOPITUITARISM (H): ICD-10-CM

## 2019-02-07 NOTE — TELEPHONE ENCOUNTER
[facility] Call Center    Phone Message    May a detailed message be left on voicemail: yes    Reason for Call: Medication Question or concern regarding medication   Prescription Clarification  Name of Medication: Levothyroxine, Dexamethasone, Testosterone, and Somatropin (growth hormone)  Prescribing Provider: VIPUL PINEDA   Pharmacy: There are 2 pharmacies pt wants them to be sent to.    What on the order needs clarification?   In the system, the prescriber provider is pt's primary physician but pt wants Iv[place]ki to continue prescrivbing them.    Pt would like Levothyroxine and Dexamethasone to be sent to:   [facility] pharmacy, [address]  Ph#: [phone]    And Pt would like Rx Testosterone, Somatropin (growth hormone) to be sent to:  Mail Order Pharmacy : [facility], [address] [address] [place], [place] [address]  Ph#: [phone]    Pt thinks that the Dexamethasone that is currently shown on pt's mychart is incorrect. What pt does now is he takes half of the 0.5 mg tablet. Please fix dosing if appropriate.     Action Taken: Message routed to:  [facility] ([facility]

## 2019-02-08 DIAGNOSIS — E23.0 HYPOPITUITARISM (H): ICD-10-CM

## 2019-02-08 NOTE — TELEPHONE ENCOUNTER
testosterone cypionate (DEPOTESTOTERONE) 200 MG/ML injection   Last Written Prescription Date:  7/12/18  Last Fill Quantity: 10ml,   # refills: 1  Last Office Visit :10/2/18  Future Office visit: 3/6/19     Somatropin 5 MG/2ML SOLN  Last Written Prescription Date:  7/12/18  Last Fill Quantity: 2ml,   # refills: 3      Routing refill request to provider for review/approval because: previously from other provider. testosterone  Controlled.  Labs.  Somatropin  Not on protocol

## 2019-02-08 NOTE — TELEPHONE ENCOUNTER
levothyroxine (SYNTHROID/LEVOTHROID) 125 MCG tablet  Last Written Prescription Date:  7/12/18  Last Fill Quantity: 90,   # refills: 3  Last Office Visit : 10/2/18  Future Office visit:  3/6/19    dexamethasone (DECADRON) 1 MG tablet      Last Written Prescription Date:  7/12/18  Last Fill Quantity: 90,   # refills: 3        Routed  because: previously filled by other provider.   levothyroxine:. tsh abnormal,   Decadron: not on protocol

## 2019-02-09 RX ORDER — TESTOSTERONE CYPIONATE 200 MG/ML
100 INJECTION, SOLUTION INTRAMUSCULAR WEEKLY
Qty: 10 ML | Refills: 1 | Status: SHIPPED | OUTPATIENT
Start: 2019-02-09 | End: 2019-04-04

## 2019-02-09 RX ORDER — DEXAMETHASONE 1 MG
TABLET ORAL
Qty: 90 TABLET | Refills: 3 | Status: SHIPPED | OUTPATIENT
Start: 2019-02-09 | End: 2019-04-04

## 2019-02-09 RX ORDER — LEVOTHYROXINE SODIUM 125 UG/1
125 TABLET ORAL DAILY
Qty: 90 TABLET | Refills: 3 | Status: SHIPPED | OUTPATIENT
Start: 2019-02-09 | End: 2019-04-04

## 2019-03-06 ENCOUNTER — OFFICE VISIT (OUTPATIENT)
Dept: ENDOCRINOLOGY | Facility: CLINIC | Age: 39
End: 2019-03-06

## 2019-03-06 VITALS
BODY MASS INDEX: 25.57 KG/M2 | DIASTOLIC BLOOD PRESSURE: 78 MMHG | HEIGHT: 72 IN | HEART RATE: 62 BPM | SYSTOLIC BLOOD PRESSURE: 113 MMHG | WEIGHT: 188.8 LBS

## 2019-03-06 DIAGNOSIS — E22.0 ACROMEGALY (H): Primary | ICD-10-CM

## 2019-03-06 DIAGNOSIS — Z13.1 SCREENING FOR DIABETES MELLITUS: ICD-10-CM

## 2019-03-06 DIAGNOSIS — E27.40 CHRONIC ADRENAL INSUFFICIENCY (H): ICD-10-CM

## 2019-03-06 DIAGNOSIS — E78.5 DYSLIPIDEMIA: ICD-10-CM

## 2019-03-06 DIAGNOSIS — E29.1 SECONDARY MALE HYPOGONADISM: ICD-10-CM

## 2019-03-06 DIAGNOSIS — E03.8 SECONDARY HYPOTHYROIDISM: ICD-10-CM

## 2019-03-06 ASSESSMENT — MIFFLIN-ST. JEOR: SCORE: 1821.39

## 2019-03-06 ASSESSMENT — PAIN SCALES - GENERAL: PAINLEVEL: NO PAIN (0)

## 2019-03-06 NOTE — LETTER
2019      Ivan D Welander  [address]  [facility] [address]        To whom it may concern,    Ivan Welander (    I have reviewed your recent laboratory tests.  I am pleased to report that the following tests were normal or unchanged:   {[facility] LAB LIST 2:923476}    The following tests were abnormal:  {[facility] LAB LIST 2:926299}    I recommend the following:  {[facility] RECOMMENDATIONS MGR}      If you have any problems or concerns, please call myself or my nurse at the number above.    Sincerely,      Damaris Tejada {PROVIDER CREDENTIALS:227234}

## 2019-03-06 NOTE — LETTER
3/6/2019       RE: Ivan D Welander  [address]  [facility] [address]     Dear Colleague,    Thank you for referring your patient, Ivan D Welander, to the [facility] ENDOCRINOLOGY at [facility]. Please see a copy of my visit note below.                                                                               - Endocrinology Follow up -    Reason for visit/consult:  acromegaly    Primary care provider: No Ref-Primary, Physician    Assessment and Plan  38 year old male with acromegaly s/p TSS twice, RT, long term panhypopituitarism    # Acromegaly  Post TSS twice, last MRI 2/2016 post surgical changes.  Pituitary MRI was done in December 2018 no residual tumor.  We will do next follow-up MRI in 3 years.    # Secondary adrenal insufficiency  Previous visit we discussed hydrocortisone is preferable to dexamethasone and discussed glucocorticoid and mineral corticoid.   Since patient feels well , he prefers to stay on stay Dexamethasone 0.5 mg daily, rather than switching to hydrocortisone.  - also we will check A1C prior to next visit for screening.     # Secondary hypothryoidism  -Currently he is euthyroid with LT4 125 mcg  -Continue current dose of levothyroxine 125 mcg daily.    # Secondary hypogonadism  - Blood work in the morning testosterone, LH, FSH   - continue curernt testosterone injection 100 mg every week     # GH deficiecy  Acromegaly, post TSS and RT, no residual.   We try to hold growth hormone injection but patient felt weak and would like to resume growth hormone injection. IGF1 previous was normal range with GH injection.   -Continue current dose of growth hormone 0.5 mg subcu daily.        - RTC with me in 6 month then extend to 1 year.     I spent 30 minutes with this patient face to face and explained the conditions and plans (more than 50% of time was counseling/coordination of care, expalined difference between dexamethasone and hydrocortisone, discussed  blood work plan and MRI follow up, discussed association colon cancer and GH) . The patient understood and is satisfied with today's visit. Return to clinic with me in 6 months.         Damaris Tejada MD  Staff Physician  Endocrinology and Metabolism  License: [id number]    Interval history ; patient has been doing well, however felt a little bit weak since he is off growth hormone injection.  Therefore he was about to resume growth hormone again.  Also he has been missing testosterone injection mentioned some issue his insurance company in the pharmacy?  Otherwise has been stable taking dexamethasone 0.5 mg for adrenal insufficiency.  MRI negative.  HPI: A 39 yo male with acromegaly, here for the care transition due to interstate move.  He moved in 7/2018 from [place], originally from [place].    He was diagnosed Acromegaly in 2007, during the regular physical check up in [facility], and screened and diagnosed. Patient recalls that his pituitary tumor was 1 inch or so.   In 12/2009 first surgery was done at [facility], RT was done after that in [place] 6/2010.   2/2011 he underwent colonoscopy for Colon cancer screening, and results positive for malignancy, then underwent sigmoid resection.     Patient moved to [place], residual tumor detected. his second surgery 12/2015 at [facility] for residual tumor.     Patient has been taking Dexamethasone, testosterone, GH for his panhypopituitarism started since 2010. He used to be on Hydrocortisone, however tends to forgets afternoon dose, then switched to dexamethasone 0.5 mg in [place] due to missing dose.   Levothyroxine 125 mcg. Testosterone injection every week 100 mg.   Growth hormone (neutropin) 0.5 mg daily since 2017.   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     Last visit 3/20/2018 [facility] Neurosurgery Dr. Parada visit for GHD.     4/2017 colonoscoy: OK,     Past Medical/Surgical History:  Past  Medical History:   Diagnosis Date     Cancer (H)     Colon     Thyroid disease     Acromegaly     Past Surgical History:   Procedure Laterality Date     APPENDECTOMY       COLONOSCOPY       GI SURGERY      sigmoid colectomy     HEAD & NECK SURGERY      transphenoidal hypophysectomy       Allergies:  No Known Allergies    Current Medications   Current Outpatient Medications   Medication     dexamethasone (DECADRON) 1 MG tablet     levothyroxine (SYNTHROID/LEVOTHROID) 125 MCG tablet     Sildenafil Citrate (VIAGRA PO)     Somatropin 5 MG/2ML SOLN     testosterone cypionate (DEPOTESTOSTERONE) 200 MG/ML injection     No current facility-administered medications for this visit.        Family History:  Family History   Problem Relation Age of Onset     Rheumatoid Arthritis Mother      Rheumatoid Arthritis Other      Diabetes No family hx of      Coronary Artery Disease No family hx of      Hypertension No family hx of      Hyperlipidemia No family hx of      Cerebrovascular Disease No family hx of      Breast Cancer No family hx of      Colon Cancer No family hx of      Prostate Cancer No family hx of      Other Cancer No family hx of      Depression No family hx of      Anxiety Disorder No family hx of      Mental Illness No family hx of      Substance Abuse No family hx of      Anesthesia Reaction No family hx of      Asthma No family hx of      Osteoporosis No family hx of      Genetic Disorder No family hx of      Thyroid Disease No family hx of      Obesity No family hx of      Unknown/Adopted No family hx of    Maternal uncle: some acromegalic feature    Social History:  Social History     Tobacco Use     Smoking status: Never Smoker     Smokeless tobacco: Never Used   Substance Use Topics     Alcohol use: Yes     Comment: Only so often       ROS:  Full review of systems taken with the help of the intake sheet. Otherwise a complete 14 point review of systems was taken and is negative unless stated in the history  "above.      Physical Exam:   Blood pressure 113/78, pulse 62, height 1.84 m (6' 0.44\"), weight 85.6 kg (188 lb 12.8 oz).  General: well appearing, no acute distress, pleasant and conversant,   Mental Status/neuro: alert and oriented  Face: symmetrical, normal facial color  Eyes: anicteric, PERRL, no proptosis or lid lag  Neck: suppler, no lymphadenopahty  Thyroid: normal size and texture, no nodule palpable, no bruits  Hand/finger: enlarged (per patient was bigger before and his ring recently loosened and fell)  Heart: regular rhythm, S1S2, no murmur appreciated  Chest: no gynecomastia, skin tags several +  Lung: clear to auscultation bilaterally  Abdomen: soft, NT/ND, no hepatomegaly  Legs: no swelling or edema      Labs : I reviewed data from epic and extract and summarize the pertinent data here.               1/2018  TSH   0.02  Cortisol 5.1    MRI Brain: I personally reviewed the original images and agree with the below reports.   Brain/ Pituitary MRI without and with contrast     History: Acromegaly (H). 38 year old male with?acromegaly s/p TSS  twice, RT, long term panhypopituitarism. Post TSS  last MRI 2/2016  post surgical changes (no prior images available.)     ICD-10: Acromegaly (H)     Comparison:  None      Technique: Axial diffusion and FLAIR images of the whole brain  obtained without intravenous contrast. Sagittal T1 and T2-weighted,  coronal T2-weighted, coronal T1-weighted images with focus on the  sella were obtained without intravenous contrast. Post intravenous  contrast using gadolinium coronal and sagittal T1-weighted images were  obtained focused on the sella. Dynamic postcontrast coronal  T1-weighted images were also obtained.     Contrast: 7.5 mL Gadavist     Findings:       Postsurgical changes of transsphenoidal surgery. There are 2 foci of  T1 hyperintensity in the sella turcica without evidence of enhancement  (series 100, image 11). Few scattered punctate white matter T2  hyperintense " foci.     No mass is demonstrated within the sella. The pituitary stalk appears  midline. The optic chiasm appears intact and not displaced.  The major cavernous carotid vascular flow-voids appear patent.       FLAIR images through the whole brain are unremarkable, and demonstrate  no mass effect, midline shift, or significant enlargement of the  ventricles. Small mucous retention cysts in the left and right  maxillary sinuses.                                                                      Impression:  1. Postsurgical changes of transsphenoidal resection. No evidence of  recurrence.  2. Few scattered punctate white matter T2 hyperintense foci,  nonspecific.                   Again, thank you for allowing me to participate in the care of your patient.      Sincerely,    Damaris Tejada MD

## 2019-03-06 NOTE — PROGRESS NOTES
- Endocrinology Follow up -    Reason for visit/consult:  acromegaly    Primary care provider: No Ref-Primary, Physician    Assessment and Plan  38 year old male with acromegaly s/p TSS twice, RT, long term panhypopituitarism    # Acromegaly  Post TSS twice, last MRI 2/2016 post surgical changes.  Pituitary MRI was done in December 2018 no residual tumor.  We will do next follow-up MRI in 3 years.    # Secondary adrenal insufficiency  Previous visit we discussed hydrocortisone is preferable to dexamethasone and discussed glucocorticoid and mineral corticoid.   Since patient feels well , he prefers to stay on stay Dexamethasone 0.5 mg daily, rather than switching to hydrocortisone.  - also we will check A1C prior to next visit for screening.     # Secondary hypothryoidism  -Currently he is euthyroid with LT4 125 mcg  -Continue current dose of levothyroxine 125 mcg daily.    # Secondary hypogonadism  - Blood work in the morning testosterone, LH, FSH   - continue curernt testosterone injection 100 mg every week     # GH deficiecy  Acromegaly, post TSS and RT, no residual.   We try to hold growth hormone injection but patient felt weak and would like to resume growth hormone injection. IGF1 previous was normal range with GH injection.   -Continue current dose of growth hormone 0.5 mg subcu daily.        - RTC with me in 6 month then extend to 1 year.     I spent 30 minutes with this patient face to face and explained the conditions and plans (more than 50% of time was counseling/coordination of care, expalined difference between dexamethasone and hydrocortisone, discussed blood work plan and MRI follow up, discussed association colon cancer and GH) . The patient understood and is satisfied with today's visit. Return to clinic with me in 6 months.         Damaris Tejada MD  Staff Physician  Endocrinology and Metabolism  License: [id number]    Interval  history ; patient has been doing well, however felt a little bit weak since he is off growth hormone injection.  Therefore he was about to resume growth hormone again.  Also he has been missing testosterone injection mentioned some issue his insurance company in the pharmacy?  Otherwise has been stable taking dexamethasone 0.5 mg for adrenal insufficiency.  MRI negative.  HPI: A 37 yo male with acromegaly, here for the care transition due to interstate move.  He moved in 7/2018 from [place], originally from [place].    He was diagnosed Acromegaly in 2007, during the regular physical check up in [facility], and screened and diagnosed. Patient recalls that his pituitary tumor was 1 inch or so.   In 12/2009 first surgery was done at [facility], RT was done after that in [place] 6/2010.   2/2011 he underwent colonoscopy for Colon cancer screening, and results positive for malignancy, then underwent sigmoid resection.     Patient moved to [place], residual tumor detected. his second surgery 12/2015 at [facility] for residual tumor.     Patient has been taking Dexamethasone, testosterone, GH for his panhypopituitarism started since 2010. He used to be on Hydrocortisone, however tends to forgets afternoon dose, then switched to dexamethasone 0.5 mg in [place] due to missing dose.   Levothyroxine 125 mcg. Testosterone injection every week 100 mg.   Growth hormone (neutropin) 0.5 mg daily since 2017.   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     Last visit 3/20/2018 [facility] Neurosurgery Dr. Parada visit for GHD.     4/2017 colonoscoy: OK,     Past Medical/Surgical History:  Past Medical History:   Diagnosis Date     Cancer (H)     Colon     Thyroid disease     Acromegaly     Past Surgical History:   Procedure Laterality Date     APPENDECTOMY       COLONOSCOPY       GI SURGERY      sigmoid colectomy     HEAD & NECK SURGERY      transphenoidal  "hypophysectomy       Allergies:  No Known Allergies    Current Medications   Current Outpatient Medications   Medication     dexamethasone (DECADRON) 1 MG tablet     levothyroxine (SYNTHROID/LEVOTHROID) 125 MCG tablet     Sildenafil Citrate (VIAGRA PO)     Somatropin 5 MG/2ML SOLN     testosterone cypionate (DEPOTESTOSTERONE) 200 MG/ML injection     No current facility-administered medications for this visit.        Family History:  Family History   Problem Relation Age of Onset     Rheumatoid Arthritis Mother      Rheumatoid Arthritis Other      Diabetes No family hx of      Coronary Artery Disease No family hx of      Hypertension No family hx of      Hyperlipidemia No family hx of      Cerebrovascular Disease No family hx of      Breast Cancer No family hx of      Colon Cancer No family hx of      Prostate Cancer No family hx of      Other Cancer No family hx of      Depression No family hx of      Anxiety Disorder No family hx of      Mental Illness No family hx of      Substance Abuse No family hx of      Anesthesia Reaction No family hx of      Asthma No family hx of      Osteoporosis No family hx of      Genetic Disorder No family hx of      Thyroid Disease No family hx of      Obesity No family hx of      Unknown/Adopted No family hx of    Maternal uncle: some acromegalic feature    Social History:  Social History     Tobacco Use     Smoking status: Never Smoker     Smokeless tobacco: Never Used   Substance Use Topics     Alcohol use: Yes     Comment: Only so often       ROS:  Full review of systems taken with the help of the intake sheet. Otherwise a complete 14 point review of systems was taken and is negative unless stated in the history above.      Physical Exam:   Blood pressure 113/78, pulse 62, height 1.84 m (6' 0.44\"), weight 85.6 kg (188 lb 12.8 oz).  General: well appearing, no acute distress, pleasant and conversant,   Mental Status/neuro: alert and oriented  Face: symmetrical, normal facial " color  Eyes: anicteric, PERRL, no proptosis or lid lag  Neck: suppler, no lymphadenopahty  Thyroid: normal size and texture, no nodule palpable, no bruits  Hand/finger: enlarged (per patient was bigger before and his ring recently loosened and fell)  Heart: regular rhythm, S1S2, no murmur appreciated  Chest: no gynecomastia, skin tags several +  Lung: clear to auscultation bilaterally  Abdomen: soft, NT/ND, no hepatomegaly  Legs: no swelling or edema      Labs : I reviewed data from epic and extract and summarize the pertinent data here.               1/2018  TSH   0.02  Cortisol 5.1    MRI Brain: I personally reviewed the original images and agree with the below reports.   Brain/ Pituitary MRI without and with contrast     History: Acromegaly (H). 38 year old male with?acromegaly s/p TSS  twice, RT, long term panhypopituitarism. Post TSS  last MRI 2/2016  post surgical changes (no prior images available.)     ICD-10: Acromegaly (H)     Comparison:  None      Technique: Axial diffusion and FLAIR images of the whole brain  obtained without intravenous contrast. Sagittal T1 and T2-weighted,  coronal T2-weighted, coronal T1-weighted images with focus on the  sella were obtained without intravenous contrast. Post intravenous  contrast using gadolinium coronal and sagittal T1-weighted images were  obtained focused on the sella. Dynamic postcontrast coronal  T1-weighted images were also obtained.     Contrast: 7.5 mL Gadavist     Findings:       Postsurgical changes of transsphenoidal surgery. There are 2 foci of  T1 hyperintensity in the sella turcica without evidence of enhancement  (series 100, image 11). Few scattered punctate white matter T2  hyperintense foci.     No mass is demonstrated within the sella. The pituitary stalk appears  midline. The optic chiasm appears intact and not displaced.  The major cavernous carotid vascular flow-voids appear patent.       FLAIR images through the whole brain are unremarkable,  and demonstrate  no mass effect, midline shift, or significant enlargement of the  ventricles. Small mucous retention cysts in the left and right  maxillary sinuses.                                                                      Impression:  1. Postsurgical changes of transsphenoidal resection. No evidence of  recurrence.  2. Few scattered punctate white matter T2 hyperintense foci,  nonspecific.

## 2019-03-07 ENCOUNTER — TELEPHONE (OUTPATIENT)
Dept: ENDOCRINOLOGY | Facility: CLINIC | Age: 39
End: 2019-03-07

## 2019-03-07 DIAGNOSIS — E23.0 GHD (GROWTH HORMONE DEFICIENCY) (H): Primary | ICD-10-CM

## 2019-03-07 NOTE — TELEPHONE ENCOUNTER
----- Message from Tammy Arroyo sent at 3/7/2019 12:16 PM CST -----  Regarding: RE: pa NEEDED   Ok I think I figured it out.  It must be the Nutropin form of Somatropin.  Thanks.    Tammy  ----- Message -----  From: Karma Estrada RN  Sent: 3/6/2019   3:25 PM  To: Tammy Arroyo  Subject: FW: pa NEEDED                                    You must not have  view of current medication  list ? Somatropin 5mg/2 ml  Inject  0.5 mg daily subque  Is on the medication list .   ----- Message -----  From: Tammy Arroyo  Sent: 3/6/2019   2:38 PM  To: Karma Estrada RN  Subject: RE: pa NEEDED                                    I can certainly look into a PA.   Can you let me know what from of Somatropin the patient is currently on?  The chart says Nutropin but that is dates 2017.   ----- Message -----  From: Karma Estrada RN  Sent: 3/6/2019   2:25 PM  To: Tammy Arroyo  Subject: pa NEEDED                                        Alex  was  Prescribed somatotropin 2/9/19  And only received a letter from [facility] that a letter from Dr Tejada was needed. I am assuming this means it needs a prio auth.  Can you check into it . He transferred here from another  clinic. I am  Not sure why we didn't get anything from [facility] before now .

## 2019-03-07 NOTE — TELEPHONE ENCOUNTER
PA Initiation    Medication: NUTROPIN   Insurance Company: nfon   Pharmacy Filling the Rx: [facility] [place] - [place], [place] - [address]  Filling Pharmacy Phone:    Filling Pharmacy Fax:    Start Date: 3/7/2019

## 2019-03-13 NOTE — TELEPHONE ENCOUNTER
It looks like  Nutropin is the only one he has been on . What is the  covered formulary?  Provider will need to send new order for  Covered growth hormone.

## 2019-03-13 NOTE — TELEPHONE ENCOUNTER
PRIOR AUTHORIZATION DENIED    Medication: NUTROPIN - Denied     Denial Date: 3/13/2019    Denial Rational:  N/A     Appeal Information:  Can submit letter

## 2019-03-14 ENCOUNTER — TELEPHONE (OUTPATIENT)
Dept: ENDOCRINOLOGY | Facility: CLINIC | Age: 39
End: 2019-03-14

## 2019-03-14 NOTE — TELEPHONE ENCOUNTER
Medication Appeal Initiation    We have initiated an appeal for the requested medication:  Medication: NUTROPIN - Appeal initiated   Appeal Start Date:  3/14/2019  Insurance Company:  [facility] ([facility])  Comments:   Letter faxed in Letters tab - Original PA was done my clinic in [place] - denial letter faxed to me from insurance - see below

## 2019-03-18 NOTE — TELEPHONE ENCOUNTER
MEDICATION APPEAL APPROVED    Medication: NUTROPIN - Appeal Approved  Authorization Effective Date:  03/14/19  Authorization Expiration Date:  03/14/20  Approved Dose/Quantity: 30 days at a time  Reference #:   [id number]  Insurance Company: [employer] ([facility]) - Phone [phone] Fax [phone]  Expected CoPay:       CoPay Card Available:      Foundation Assistance Needed:    Which Pharmacy is filling the prescription (Not needed for infusion/clinic administered):

## 2019-04-04 ENCOUNTER — TELEPHONE (OUTPATIENT)
Dept: ENDOCRINOLOGY | Facility: CLINIC | Age: 39
End: 2019-04-04

## 2019-04-04 DIAGNOSIS — E23.0 HYPOPITUITARISM (H): ICD-10-CM

## 2019-04-04 DIAGNOSIS — E23.0 GHD (GROWTH HORMONE DEFICIENCY) (H): ICD-10-CM

## 2019-04-04 RX ORDER — DEXAMETHASONE 1 MG
TABLET ORAL
Qty: 90 TABLET | Refills: 2 | Status: SHIPPED | OUTPATIENT
Start: 2019-04-04 | End: 2020-05-29

## 2019-04-04 RX ORDER — TESTOSTERONE CYPIONATE 200 MG/ML
100 INJECTION, SOLUTION INTRAMUSCULAR WEEKLY
Qty: 10 ML | Refills: 1 | Status: SHIPPED | OUTPATIENT
Start: 2019-04-04 | End: 2019-11-06

## 2019-04-04 RX ORDER — LEVOTHYROXINE SODIUM 125 UG/1
125 TABLET ORAL DAILY
Qty: 90 TABLET | Refills: 2 | Status: SHIPPED | OUTPATIENT
Start: 2019-04-04 | End: 2020-02-08

## 2019-04-04 RX ORDER — LEVOTHYROXINE SODIUM 125 UG/1
125 TABLET ORAL DAILY
Qty: 90 TABLET | Refills: 2 | Status: CANCELLED | OUTPATIENT
Start: 2019-04-04

## 2019-04-04 NOTE — TELEPHONE ENCOUNTER
----- Message from Tammy Arroyo sent at 4/4/2019 10:37 AM CDT -----  Regarding: Patient asking for new Rx  Hi -    Patient called looking for new prescriptions be sent to the [id number] - [place], [place] - [address] [facility] [address] [facility] [address] - he would like the Synthroid, Testosterone and the Decadron sent there.  Thank you.      Tammy Arroyo  Pharmacy Liaison   [address] [address]   [email or web address]  [email or web address]   Connect with [facility] on social media   [phone]

## 2019-04-04 NOTE — TELEPHONE ENCOUNTER
Scripts were cancelled at  [facility] pharmacy  And forwarded to   [facility] [address] per patient request. Testosterone, decadron and levothyroxine

## 2019-04-05 NOTE — TELEPHONE ENCOUNTER
Prescription for Somatropin [facility]ed to [email or web address] at [phone].    [facility] D[facility]  Adult Endocrinology  [facility]

## 2019-04-10 ENCOUNTER — MYC MEDICAL ADVICE (OUTPATIENT)
Dept: ENDOCRINOLOGY | Facility: CLINIC | Age: 39
End: 2019-04-10

## 2019-04-10 DIAGNOSIS — E29.1 HYPOGONADISM MALE: Primary | ICD-10-CM

## 2019-08-12 ENCOUNTER — DOCUMENTATION ONLY (OUTPATIENT)
Dept: CARE COORDINATION | Facility: CLINIC | Age: 39
End: 2019-08-12

## 2019-11-05 ENCOUNTER — HEALTH MAINTENANCE LETTER (OUTPATIENT)
Age: 39
End: 2019-11-05

## 2019-11-06 ENCOUNTER — MYC MEDICAL ADVICE (OUTPATIENT)
Dept: ENDOCRINOLOGY | Facility: CLINIC | Age: 39
End: 2019-11-06

## 2019-11-06 ENCOUNTER — TELEPHONE (OUTPATIENT)
Dept: ENDOCRINOLOGY | Facility: CLINIC | Age: 39
End: 2019-11-06

## 2019-11-06 DIAGNOSIS — E23.0 HYPOPITUITARISM (H): ICD-10-CM

## 2019-11-06 RX ORDER — TESTOSTERONE CYPIONATE 200 MG/ML
100 INJECTION, SOLUTION INTRAMUSCULAR WEEKLY
Qty: 6 ML | Refills: 5 | Status: SHIPPED | OUTPATIENT
Start: 2019-11-06 | End: 2020-06-15

## 2019-11-06 NOTE — TELEPHONE ENCOUNTER
testosterone cypionate (DEPOTESTOSTERONE) 200 MG/ML injection      Last Written Prescription Date:  4-4-19  Last Fill Quantity: 10 mml,   # refills: 1  Last Office Visit : 3-6-19 (rtc 6 m)  Future Office visit:  none    Routing refill request to provider for review/approval because:  Controlled med  Overdue labs: ASL, ALT, PSA, SERUM TESTOSTERONE, hematocrit      Scheduling has been notified to contact the pt for appointment.

## 2019-11-06 NOTE — TELEPHONE ENCOUNTER
Prior Authorization Specialty Medication Request    Medication/Dose:   somatropin (NORDITROPIN FLEXPRO) 5 MG/1.5ML SOLN 1.5 mL 5 11/6/2019  --   Sig: Inject 0.5 mg subcutaneously daily.       ICD code (if different than what is on RX):    Previously Tried and Failed:      Important Lab Values:   Ins Growth Factor 1 153   83 - 238     Prolactin 11   2 - 18         Rationale:     Insurance Name:   Insurance ID:   Insurance Phone Number:     Pharmacy Information (if different than what is on RX)  Name:    Phone:

## 2019-11-06 NOTE — TELEPHONE ENCOUNTER
PA Initiation    Medication: Norditropin Flexpro 5mg/1.5mL - Pending  Insurance Company: [employer] - Phone [phone] Fax [phone]  Pharmacy Filling the Rx: [facility] - [place], [place] - [facility] [address]  Filling Pharmacy Phone: [phone]  Filling Pharmacy Fax: [phone]  Start Date: 11/6/2019

## 2019-11-07 ENCOUNTER — TELEPHONE (OUTPATIENT)
Dept: ENDOCRINOLOGY | Facility: CLINIC | Age: 39
End: 2019-11-07

## 2019-11-07 NOTE — TELEPHONE ENCOUNTER
Prior Authorization Specialty Medication Request    Medication/Dose:   testosterone cypionate (DEPOTESTOSTERONE) 200 MG/ML injection 6 mL 5 11/6/2019  No   Sig - Route: Inject 0.5 mLs (100 mg) into the muscle once a week       ICD code (if different than what is on RX):    Previously Tried and Failed:      Important Lab Values:   Rationale:     Insurance Name:   Insurance ID:   Insurance Phone Number:     Pharmacy Information (if different than what is on RX)  Name:    Phone:

## 2019-11-07 NOTE — TELEPHONE ENCOUNTER
Prior Authorization Specialty Medication Request    Medication/Dose:   somatropin (NORDITROPIN FLEXPRO) 5 MG/1.5ML SOLN 1.5 mL 5 11/6/2019  --   Sig: Inject 0.5 mg subcutaneously daily       ICD code (if different than what is on RX):    Previously Tried and Failed:      Important Lab Values:   Ins Growth Factor 1 153   57 - 758       Rationale:     Insurance Name:   Insurance ID: Insurance Phone Number:     Pharmacy Information (if different than what is on RX)  Name:    Phone:

## 2019-11-08 ENCOUNTER — TELEPHONE (OUTPATIENT)
Dept: ENDOCRINOLOGY | Facility: CLINIC | Age: 39
End: 2019-11-08

## 2019-11-08 NOTE — TELEPHONE ENCOUNTER
Pharm D Tammy confirms they will send 30 day supply of growth hormone.   America Gold RN on 11/8/2019 at 1:42 PM

## 2019-11-08 NOTE — TELEPHONE ENCOUNTER
[facility] Call Center    Phone Message    May a detailed message be left on voicemail: yes    Reason for Call: Medication Question or concern regarding medication   Prescription Clarification  Name of Medication: Somatropin  Prescribing Provider: Terrell   Pharmacy:  Specialty   What on the order needs clarification? Need to verify that qty is correct, or if needs to be changed to 30 day supply          Action Taken: Message routed to:  [facility] [place]

## 2019-11-08 NOTE — TELEPHONE ENCOUNTER
Carolyn Perales Kari 2 days ago      I will take care of this. Thank you!    Routing comment        Roxy Sanders  [facility] 2 days ago      Looks like Tammy Montgomery did this PA in the past, if this is no longer you guys, please let me know. Thanks Royx    Routing comment

## 2019-11-08 NOTE — TELEPHONE ENCOUNTER
Prior Authorization Approval    Authorization Effective Date: 10/8/2019  Authorization Expiration Date: 11/8/2020  Medication: Norditropin Flexpro 5mg/1.5mL - Approved  Approved Dose/Quantity: UD  Reference #: [id number]   Insurance Company: [employer] - Phone [phone] Fax [phone]  Expected CoPay: $40.00     CoPay Card Available: Yes    Foundation Assistance Needed: [facility]  Which Pharmacy is filling the prescription (Not needed for infusion/clinic administered): [facility] - [place], [place] - [address]  Pharmacy Notified: Yes  Patient Notified: Yes

## 2019-11-11 ENCOUNTER — MEDICAL CORRESPONDENCE (OUTPATIENT)
Dept: HEALTH INFORMATION MANAGEMENT | Facility: CLINIC | Age: 39
End: 2019-11-11

## 2019-11-11 NOTE — TELEPHONE ENCOUNTER
CENTRAL PRIOR AUTHORIZATION  [phone]    PA Initiation    Medication:   Insurance Company: [employer] - Phone [phone] Fax [phone]  Pharmacy Filling the Rx: [id number] - [place], [place] - [address] AT [facility] [address]  Filling Pharmacy Phone: [phone]  Filling Pharmacy Fax:    Start Date: 11/11/2019

## 2019-11-12 NOTE — TELEPHONE ENCOUNTER
Prior Authorization Approval    Authorization Effective Date: 10/13/2019  Authorization Expiration Date: 11/11/2022  Medication: testosterone cypionate vial - Approved   Approved Dose/Quantity:   Reference #: [id number]   Insurance Company: [employer] - Phone [phone] Fax [phone]  Expected CoPay:       CoPay Card Available: No    Foundation Assistance Needed:    Which Pharmacy is filling the prescription (Not needed for infusion/clinic administered): [id number] - [place] [address]  Pharmacy Notified: Yes  Patient Notified: YesComment:  pharmacy will notify the patient.

## 2020-02-07 DIAGNOSIS — E23.0 HYPOPITUITARISM (H): ICD-10-CM

## 2020-02-08 RX ORDER — LEVOTHYROXINE SODIUM 125 UG/1
125 TABLET ORAL DAILY
Qty: 90 TABLET | Refills: 3 | Status: SHIPPED | OUTPATIENT
Start: 2020-02-08 | End: 2020-06-15

## 2020-02-08 NOTE — TELEPHONE ENCOUNTER
levothyroxine (SYNTHROID/LEVOTHROID) 125 MCG tablet     Last Written Prescription Date:  4/4/19  Last Fill Quantity: 90,   # refills: 2  Last Office Visit : 3/6/19  Future Office visit: 3/11/20    Routing refill request to provider for review/approval because:  tsh

## 2020-02-13 ENCOUNTER — TELEPHONE (OUTPATIENT)
Dept: ENDOCRINOLOGY | Facility: CLINIC | Age: 40
End: 2020-02-13

## 2020-02-13 NOTE — TELEPHONE ENCOUNTER
[facility] Call Center    Phone Message    May a detailed message be left on voicemail: yes     Reason for Call: pt called wanting to be seen sooner. Inform pt he is already added to wait list. He asked if he could see another provider. Appt  Notes for March 11 states   Lab results and medication dosage, medication changes related to fertility (trying to have another child, last time I believe it was recommended that I go off of testosterone and take HCG    Action Taken: Message routed to:  [facility] endo    Travel Screening: Not Applicable

## 2020-02-14 NOTE — TELEPHONE ENCOUNTER
Sent MyC msg to patient explaining we have no sooner appointments and that he has been added to the waitlist, as well as urging patient to call our nurse line if they are experiencing new or emergent symptoms.

## 2020-02-19 DIAGNOSIS — Z13.1 SCREENING FOR DIABETES MELLITUS: ICD-10-CM

## 2020-02-19 DIAGNOSIS — E22.0 ACROMEGALY (H): ICD-10-CM

## 2020-02-19 DIAGNOSIS — E78.5 DYSLIPIDEMIA: ICD-10-CM

## 2020-02-19 LAB
ALBUMIN SERPL-MCNC: 3.7 G/DL (ref 3.4–5)
ALP SERPL-CCNC: 52 U/L (ref 40–150)
ALT SERPL W P-5'-P-CCNC: 20 U/L (ref 0–70)
ANION GAP SERPL CALCULATED.3IONS-SCNC: 5 MMOL/L (ref 3–14)
AST SERPL W P-5'-P-CCNC: 19 U/L (ref 0–45)
BILIRUB SERPL-MCNC: 0.6 MG/DL (ref 0.2–1.3)
BUN SERPL-MCNC: 8 MG/DL (ref 7–30)
CALCIUM SERPL-MCNC: 8.5 MG/DL (ref 8.5–10.1)
CHLORIDE SERPL-SCNC: 104 MMOL/L (ref 94–109)
CHOLEST SERPL-MCNC: 238 MG/DL
CO2 SERPL-SCNC: 29 MMOL/L (ref 20–32)
CREAT SERPL-MCNC: 0.87 MG/DL (ref 0.66–1.25)
FSH SERPL-ACNC: 1 IU/L (ref 0.7–10.8)
GFR SERPL CREATININE-BSD FRML MDRD: >90 ML/MIN/{1.73_M2}
GLUCOSE SERPL-MCNC: 84 MG/DL (ref 70–99)
HBA1C MFR BLD: 5.1 % (ref 0–5.6)
HDLC SERPL-MCNC: 77 MG/DL
LDLC SERPL CALC-MCNC: 140 MG/DL
LH SERPL-ACNC: 0.2 IU/L (ref 1.5–9.3)
NONHDLC SERPL-MCNC: 161 MG/DL
POTASSIUM SERPL-SCNC: 3.4 MMOL/L (ref 3.4–5.3)
PROT SERPL-MCNC: 7.1 G/DL (ref 6.8–8.8)
SODIUM SERPL-SCNC: 138 MMOL/L (ref 133–144)
T4 FREE SERPL-MCNC: 1.27 NG/DL (ref 0.76–1.46)
TRIGL SERPL-MCNC: 104 MG/DL
TSH SERPL DL<=0.005 MIU/L-ACNC: 0.03 MU/L (ref 0.4–4)

## 2020-02-20 LAB — TESTOST SERPL-MCNC: 972 NG/DL (ref 240–950)

## 2020-02-21 LAB — IGF-I BLD-MCNC: 120 NG/ML (ref 83–238)

## 2020-03-11 ENCOUNTER — OFFICE VISIT (OUTPATIENT)
Dept: ENDOCRINOLOGY | Facility: CLINIC | Age: 40
End: 2020-03-11
Payer: COMMERCIAL

## 2020-03-11 ENCOUNTER — TELEPHONE (OUTPATIENT)
Dept: ENDOCRINOLOGY | Facility: CLINIC | Age: 40
End: 2020-03-11

## 2020-03-11 VITALS
WEIGHT: 193.3 LBS | BODY MASS INDEX: 25.62 KG/M2 | SYSTOLIC BLOOD PRESSURE: 115 MMHG | HEIGHT: 73 IN | HEART RATE: 63 BPM | DIASTOLIC BLOOD PRESSURE: 77 MMHG

## 2020-03-11 DIAGNOSIS — E23.0 PANHYPOPITUITARISM (H): ICD-10-CM

## 2020-03-11 DIAGNOSIS — E29.1 SECONDARY MALE HYPOGONADISM: Primary | ICD-10-CM

## 2020-03-11 DIAGNOSIS — E22.0 ACROMEGALY (H): ICD-10-CM

## 2020-03-11 DIAGNOSIS — E22.0 ACROMEGALY (H): Primary | ICD-10-CM

## 2020-03-11 DIAGNOSIS — E23.0 HYPOPITUITARISM (H): ICD-10-CM

## 2020-03-11 ASSESSMENT — MIFFLIN-ST. JEOR: SCORE: 1845.68

## 2020-03-11 ASSESSMENT — PAIN SCALES - GENERAL: PAINLEVEL: NO PAIN (0)

## 2020-03-11 NOTE — LETTER
3/11/2020       RE: Ivan D Welander  [address]  [facility] [address]     Dear Colleague,    Thank you for referring your patient, Ivan D Welander, to the [facility] ENDOCRINOLOGY at [facility]. Please see a copy of my visit note below.                                                                               - Endocrinology Follow up -    Reason for visit/consult:  acromegaly    Primary care provider: No Ref-Primary, Physician    Assessment and Plan  39 year old male with acromegaly s/p TSS twice, RT, long term panhypopituitarism    # Acromegaly  Post TSS twice, last MRI 2/2016 post surgical changes.  Pituitary MRI was done in December 2018 no residual tumor.  We will do next follow-up MRI in 3 years.    # Secondary adrenal insufficiency  Since patient feels well , he prefers to stay on stay Dexamethasone 0.5 mg daily, rather than switching to hydrocortisone.  A1C 5.1 good.     - continue Dexamethasone 0.5 mg daily      # Secondary hypothryoidism  Free T4 good range   -Continue current dose of levothyroxine 125 mcg daily.    # Secondary hypogonadism  Currently long standing testosterone injection 100 mg every week   Considering family planning, that he tried before, we will back to HCG therapy (previsously [facility] in 2017 he was on HCG 1500 every other day)    - HCG 1500 unit Mon/Wed/Fri    - stop testosterone injection     # GH deficiecy  Acromegaly, post TSS and RT, no residual.was started at [facility] for GH deficiency given no recurrence and colon cancer no recurrence.  Controversial but with this helping his quality of life and no active contraindicated issue on going, therefore I agreed to continue.    IGF1 previous was normal range with GH injection.  good range, not overdosed.       -Continue current dose of growth hormone 0.5 mg subcu daily.    # history of colon cancer  Stable.        - RTC with me in 1 year.     I spent 30 minutes with this patient face to face  and explained the conditions and plans (more than 50% of time was counseling/coordination of care, expalined difference between dexamethasone and hydrocortisone, discussed blood work plan and MRI follow up, discussed association colon cancer and GH) . The patient understood and is satisfied with today's visit. Return to clinic with me in 1 year.         Damaris Tejada MD  Staff Physician  Endocrinology and Metabolism  License: [id number]    Interval History as of 3/11/2020 : Patient has been doing well. No major medical event. Considering family planning, that he tried before, we will back to HCG therapy (previsously [facility] in 2017 he was on HCG 1500 every other day)  Interval history ; patient has been doing well, however felt a little bit weak since he is off growth hormone injection.  Therefore he was about to resume growth hormone again.  Also he has been missing testosterone injection mentioned some issue his insurance company in the pharmacy?  Otherwise has been stable taking dexamethasone 0.5 mg for adrenal insufficiency.  MRI negative.  HPI: A 37 yo male with acromegaly, here for the care transition due to interstate move.  He moved in 7/2018 from [place], originally from [place].    He was diagnosed Acromegaly in 2007, during the regular physical check up in [facility], and screened and diagnosed. Patient recalls that his pituitary tumor was 1 inch or so.   In 12/2009 first surgery was done at [facility], RT was done after that in [place] 6/2010.   2/2011 he underwent colonoscopy for Colon cancer screening, and results positive for malignancy, then underwent sigmoid resection.     Patient moved to [place], residual tumor detected. his second surgery 12/2015 at [facility] for residual tumor.     Patient has been taking Dexamethasone, testosterone, GH for his panhypopituitarism started since 2010. He used to be on Hydrocortisone, however tends to forgets afternoon dose, then switched to dexamethasone 0.5 mg  "in [place] due to missing dose.   Levothyroxine 125 mcg. Testosterone injection every week 100 mg.   Growth hormone (neutropin) 0.5 mg daily since 2017.   In [facility], since he was missing dexamethasone for busy baby care, he went to  vis for sickness.     Last visit 3/20/2018 [facility] Dr. Parada visit for GHD.     4/2017 colonoscoy: OK,     Past Medical/Surgical History:  Past Medical History:   Diagnosis Date     Cancer (H)     Colon     Thyroid disease     Acromegaly     Past Surgical History:   Procedure Laterality Date     APPENDECTOMY       COLONOSCOPY       GI SURGERY      sigmoid colectomy     HEAD & NECK SURGERY      transphenoidal hypophysectomy       Allergies:  No Known Allergies    Current Medications   Current Outpatient Medications   Medication     dexamethasone (DECADRON) 1 MG tablet     levothyroxine (SYNTHROID/LEVOTHROID) 125 MCG tablet     Sildenafil Citrate (VIAGRA PO)     somatropin (NORDITROPIN FLEXPRO) 5 MG/1.5ML SOLN     somatropin (NUTROPIN AQ 10) 10 MG/2ML SOLN PEN injection     Somatropin 5 MG/2ML SOLN     syringe/needle, disp, 23G X 1\" 3 ML MISC     testosterone cypionate (DEPOTESTOSTERONE) 200 MG/ML injection     No current facility-administered medications for this visit.        Family History:  Family History   Problem Relation Age of Onset     Rheumatoid Arthritis Mother      Rheumatoid Arthritis Other      Diabetes No family hx of      Coronary Artery Disease No family hx of      Hypertension No family hx of      Hyperlipidemia No family hx of      Cerebrovascular Disease No family hx of      Breast Cancer No family hx of      Colon Cancer No family hx of      Prostate Cancer No family hx of      Other Cancer No family hx of      Depression No family hx of      Anxiety Disorder No family hx of      Mental Illness No family hx of      Substance Abuse No family hx of      Anesthesia Reaction No family hx of      Asthma No family hx of      Osteoporosis No family hx " "of      Genetic Disorder No family hx of      Thyroid Disease No family hx of      Obesity No family hx of      Unknown/Adopted No family hx of    Maternal uncle: some acromegalic feature    Social History:  Social History     Tobacco Use     Smoking status: Never Smoker     Smokeless tobacco: Never Used   Substance Use Topics     Alcohol use: Yes     Comment: Only so often       ROS:  Full review of systems taken with the help of the intake sheet. Otherwise a complete 14 point review of systems was taken and is negative unless stated in the history above.      Physical Exam:   Blood pressure 115/77, pulse 63, height 1.854 m (6' 1\"), weight 87.7 kg (193 lb 4.8 oz).  General: well appearing, no acute distress, pleasant and conversant,   Mental Status/neuro: alert and oriented  Face: symmetrical, normal facial color  Eyes: anicteric, PERRL, no proptosis or lid lag  Neck: suppler, no lymphadenopahty  Thyroid: normal size and texture, no nodule palpable, no bruits  Hand/finger: enlarged (per patient was bigger before and his ring recently loosened and fell)  Heart: regular rhythm, S1S2, no murmur appreciated  Chest: no gynecomastia, skin tags several +  Lung: clear to auscultation bilaterally  Abdomen: soft, NT/ND, no hepatomegaly  Legs: no swelling or edema      Labs : I reviewed data from epic and extract and summarize the pertinent data here.               1/2018  TSH   0.02  Cortisol 5.1       2/19/2020 09:28   Sodium 138   Potassium 3.4   Chloride 104   Carbon Dioxide 29   Urea Nitrogen 8   Creatinine 0.87   GFR Estimate >90   GFR Estimate If Black >90   Calcium 8.5   Anion Gap 5   Albumin 3.7   Protein Total 7.1   Bilirubin Total 0.6   Alkaline Phosphatase 52   ALT 20   AST 19   Hemoglobin A1C 5.1   Cholesterol 238 (H)   FSH 1.0   HDL Cholesterol 77   LDL Cholesterol Calculated 140 (H)   Non HDL Cholesterol 161 (H)   T4 Free 1.27   Testosterone Total 972 (H)   Triglycerides 104   TSH 0.03 (L)   Glucose 84   Ins " Growth Factor 1 120   Lutropin 0.2 (L)     MRI Brain: I personally reviewed the original images and agree with the below reports.   Brain/ Pituitary MRI without and with contrast     History: Acromegaly (H). 38 year old male with?acromegaly s/p TSS  twice, RT, long term panhypopituitarism. Post TSS  last MRI 2/2016  post surgical changes (no prior images available.)     ICD-10: Acromegaly (H)     Comparison:  None      Technique: Axial diffusion and FLAIR images of the whole brain  obtained without intravenous contrast. Sagittal T1 and T2-weighted,  coronal T2-weighted, coronal T1-weighted images with focus on the  sella were obtained without intravenous contrast. Post intravenous  contrast using gadolinium coronal and sagittal T1-weighted images were  obtained focused on the sella. Dynamic postcontrast coronal  T1-weighted images were also obtained.     Contrast: 7.5 mL Gadavist     Findings:       Postsurgical changes of transsphenoidal surgery. There are 2 foci of  T1 hyperintensity in the sella turcica without evidence of enhancement  (series 100, image 11). Few scattered punctate white matter T2  hyperintense foci.     No mass is demonstrated within the sella. The pituitary stalk appears  midline. The optic chiasm appears intact and not displaced.  The major cavernous carotid vascular flow-voids appear patent.       FLAIR images through the whole brain are unremarkable, and demonstrate  no mass effect, midline shift, or significant enlargement of the  ventricles. Small mucous retention cysts in the left and right  maxillary sinuses.                                                                      Impression:  1. Postsurgical changes of transsphenoidal resection. No evidence of  recurrence.  2. Few scattered punctate white matter T2 hyperintense foci,  nonspecific.                   Again, thank you for allowing me to participate in the care of your patient.      Sincerely,    Damaris Tejada MD

## 2020-03-11 NOTE — PROGRESS NOTES
- Endocrinology Follow up -    Reason for visit/consult:  acromegaly    Primary care provider: No Ref-Primary, Physician    Assessment and Plan  39 year old male with acromegaly s/p TSS twice, RT, long term panhypopituitarism    # Acromegaly  Post TSS twice, last MRI 2/2016 post surgical changes.  Pituitary MRI was done in December 2018 no residual tumor.  We will do next follow-up MRI in 3 years.    # Secondary adrenal insufficiency  Since patient feels well , he prefers to stay on stay Dexamethasone 0.5 mg daily, rather than switching to hydrocortisone.  A1C 5.1 good.     - continue Dexamethasone 0.5 mg daily      # Secondary hypothryoidism  Free T4 good range   -Continue current dose of levothyroxine 125 mcg daily.    # Secondary hypogonadism  Currently long standing testosterone injection 100 mg every week   Considering family planning, that he tried before, we will back to HCG therapy (previsously [email or web address] in 2017 he was on HCG 1500 every other day)    - HCG 1500 unit Mon/Wed/Fri    - stop testosterone injection     # GH deficiecy  Acromegaly, post TSS and RT, no residual.was started at [email or web address] for GH deficiency given no recurrence and colon cancer no recurrence.  Controversial but with this helping his quality of life and no active contraindicated issue on going, therefore I agreed to continue.    IGF1 previous was normal range with GH injection.  good range, not overdosed.       -Continue current dose of growth hormone 0.5 mg subcu daily.    # history of colon cancer  Stable.        - RTC with me in 1 year.     I spent 30 minutes with this patient face to face and explained the conditions and plans (more than 50% of time was counseling/coordination of care, expalined difference between dexamethasone and hydrocortisone, discussed blood work plan and MRI follow up, discussed association colon cancer and GH) . The patient understood and  is satisfied with today's visit. Return to clinic with me in 1 year.         Damaris Tejada MD  Staff Physician  Endocrinology and Metabolism  License: [id number]    Interval History as of 3/11/2020 : Patient has been doing well. No major medical event. Considering family planning, that he tried before, we will back to HCG therapy (previsously [facility] in 2017 he was on HCG 1500 every other day)  Interval history ; patient has been doing well, however felt a little bit weak since he is off growth hormone injection.  Therefore he was about to resume growth hormone again.  Also he has been missing testosterone injection mentioned some issue his insurance company in the pharmacy?  Otherwise has been stable taking dexamethasone 0.5 mg for adrenal insufficiency.  MRI negative.  HPI: A 37 yo male with acromegaly, here for the care transition due to interstate move.  He moved in 7/2018 from [place], originally from [place].    He was diagnosed Acromegaly in 2007, during the regular physical check up in [facility], and screened and diagnosed. Patient recalls that his pituitary tumor was 1 inch or so.   In 12/2009 first surgery was done at [facility], RT was done after that in [place] 6/2010.   2/2011 he underwent colonoscopy for Colon cancer screening, and results positive for malignancy, then underwent sigmoid resection.     Patient moved to [place], residual tumor detected. his second surgery 12/2015 at [facility] for residual tumor.     Patient has been taking Dexamethasone, testosterone, GH for his panhypopituitarism started since 2010. He used to be on Hydrocortisone, however tends to forgets afternoon dose, then switched to dexamethasone 0.5 mg in [place] due to missing dose.   Levothyroxine 125 mcg. Testosterone injection every week 100 mg.   Growth hormone (neutropin) 0.5 mg daily since 2017.   In earliyer 2018, since he was missing dexamethasone for busy baby care, he went to [facility] for sickness.     Last visit  "3/20/2018 [facility] Neurosurgery Dr. Parada visit for GHD.     4/2017 colonoscoy: OK,     Past Medical/Surgical History:  Past Medical History:   Diagnosis Date     Cancer (H)     Colon     Thyroid disease     Acromegaly     Past Surgical History:   Procedure Laterality Date     APPENDECTOMY       COLONOSCOPY       GI SURGERY      sigmoid colectomy     HEAD & NECK SURGERY      transphenoidal hypophysectomy       Allergies:  No Known Allergies    Current Medications   Current Outpatient Medications   Medication     dexamethasone (DECADRON) 1 MG tablet     levothyroxine (SYNTHROID/LEVOTHROID) 125 MCG tablet     Sildenafil Citrate (VIAGRA PO)     somatropin (NORDITROPIN FLEXPRO) 5 MG/1.5ML SOLN     somatropin (NUTROPIN AQ 10) 10 MG/2ML SOLN PEN injection     Somatropin 5 MG/2ML SOLN     syringe/needle, disp, 23G X 1\" 3 ML MISC     testosterone cypionate (DEPOTESTOSTERONE) 200 MG/ML injection     No current facility-administered medications for this visit.        Family History:  Family History   Problem Relation Age of Onset     Rheumatoid Arthritis Mother      Rheumatoid Arthritis Other      Diabetes No family hx of      Coronary Artery Disease No family hx of      Hypertension No family hx of      Hyperlipidemia No family hx of      Cerebrovascular Disease No family hx of      Breast Cancer No family hx of      Colon Cancer No family hx of      Prostate Cancer No family hx of      Other Cancer No family hx of      Depression No family hx of      Anxiety Disorder No family hx of      Mental Illness No family hx of      Substance Abuse No family hx of      Anesthesia Reaction No family hx of      Asthma No family hx of      Osteoporosis No family hx of      Genetic Disorder No family hx of      Thyroid Disease No family hx of      Obesity No family hx of      Unknown/Adopted No family hx of    Maternal uncle: some acromegalic feature    Social History:  Social History     Tobacco Use     Smoking status: Never Smoker     " "Smokeless tobacco: Never Used   Substance Use Topics     Alcohol use: Yes     Comment: Only so often       ROS:  Full review of systems taken with the help of the intake sheet. Otherwise a complete 14 point review of systems was taken and is negative unless stated in the history above.      Physical Exam:   Blood pressure 115/77, pulse 63, height 1.854 m (6' 1\"), weight 87.7 kg (193 lb 4.8 oz).  General: well appearing, no acute distress, pleasant and conversant,   Mental Status/neuro: alert and oriented  Face: symmetrical, normal facial color  Eyes: anicteric, PERRL, no proptosis or lid lag  Neck: suppler, no lymphadenopahty  Thyroid: normal size and texture, no nodule palpable, no bruits  Hand/finger: enlarged (per patient was bigger before and his ring recently loosened and fell)  Heart: regular rhythm, S1S2, no murmur appreciated  Chest: no gynecomastia, skin tags several +  Lung: clear to auscultation bilaterally  Abdomen: soft, NT/ND, no hepatomegaly  Legs: no swelling or edema      Labs : I reviewed data from epic and extract and summarize the pertinent data here.               1/2018  TSH   0.02  Cortisol 5.1       2/19/2020 09:28   Sodium 138   Potassium 3.4   Chloride 104   Carbon Dioxide 29   Urea Nitrogen 8   Creatinine 0.87   GFR Estimate >90   GFR Estimate If Black >90   Calcium 8.5   Anion Gap 5   Albumin 3.7   Protein Total 7.1   Bilirubin Total 0.6   Alkaline Phosphatase 52   ALT 20   AST 19   Hemoglobin A1C 5.1   Cholesterol 238 (H)   FSH 1.0   HDL Cholesterol 77   LDL Cholesterol Calculated 140 (H)   Non HDL Cholesterol 161 (H)   T4 Free 1.27   Testosterone Total 972 (H)   Triglycerides 104   TSH 0.03 (L)   Glucose 84   Ins Growth Factor 1 120   Lutropin 0.2 (L)     MRI Brain: I personally reviewed the original images and agree with the below reports.   Brain/ Pituitary MRI without and with contrast     History: Acromegaly (H). 38 year old male with?acromegaly s/p TSS  twice, RT, long term " panhypopituitarism. Post TSS  last MRI 2/2016  post surgical changes (no prior images available.)     ICD-10: Acromegaly (H)     Comparison:  None      Technique: Axial diffusion and FLAIR images of the whole brain  obtained without intravenous contrast. Sagittal T1 and T2-weighted,  coronal T2-weighted, coronal T1-weighted images with focus on the  sella were obtained without intravenous contrast. Post intravenous  contrast using gadolinium coronal and sagittal T1-weighted images were  obtained focused on the sella. Dynamic postcontrast coronal  T1-weighted images were also obtained.     Contrast: 7.5 mL Gadavist     Findings:       Postsurgical changes of transsphenoidal surgery. There are 2 foci of  T1 hyperintensity in the sella turcica without evidence of enhancement  (series 100, image 11). Few scattered punctate white matter T2  hyperintense foci.     No mass is demonstrated within the sella. The pituitary stalk appears  midline. The optic chiasm appears intact and not displaced.  The major cavernous carotid vascular flow-voids appear patent.       FLAIR images through the whole brain are unremarkable, and demonstrate  no mass effect, midline shift, or significant enlargement of the  ventricles. Small mucous retention cysts in the left and right  maxillary sinuses.                                                                      Impression:  1. Postsurgical changes of transsphenoidal resection. No evidence of  recurrence.  2. Few scattered punctate white matter T2 hyperintense foci,  nonspecific.

## 2020-03-12 NOTE — TELEPHONE ENCOUNTER
Also noted - after running a test claim to initiate prior auth - the rejection stated that this medication specifically per patients insurance plan must be filled at [facility] P: [phone] or [place] [address] [facility] [place] [facility]", [place].

## 2020-03-12 NOTE — TELEPHONE ENCOUNTER
----- Message from Damaris Tejada MD sent at 3/11/2020 12:25 PM CDT -----  Regarding: PA need for HCG  Hi    I prescribed HCG today. Could you start PA ?    Damaris

## 2020-03-12 NOTE — TELEPHONE ENCOUNTER
Allison Garcias MA  P Med Specialties Endo Triage-Uc    Caller: Unspecified (Yesterday,  2:29 PM)               Can you please give a verbal as requested by pharmacy

## 2020-03-12 NOTE — TELEPHONE ENCOUNTER
[facility] - [place], [place] - [address]  [phone]    Pharm D asking if a higher concentration would result in fewer injections daily. Sent to provider for review.   America Gold RN on 3/12/2020 at 1:21 PM

## 2020-03-12 NOTE — TELEPHONE ENCOUNTER
Prior Authorization Specialty Medication Request    Medication/Dose: Needing clarification for Chorionic Gonadotropin (HCG) 6000 units SOLR   Chorionic Gonadotropin (HCG) 6000 units SOLR  10 each  3  3/11/2020   --    Sig - Route: Inject 1,500 Units as directed 3 times daily - Injection    Sent to pharmacy as: Chorionic Gonadotropin (HCG) 6000 units SOLR        ICD code (if different than what is on RX):    Previously Tried and Failed:      Important Lab Values:   Rationale:     Insurance Name:   Insurance ID:   Insurance Phone Number:     Pharmacy Information (if different than what is on RX)  Name:    Phone:          PA sent to Carolyn Perales for initiation/submission.   America Gold RN on 3/12/2020 at 10:16 AM

## 2020-03-12 NOTE — TELEPHONE ENCOUNTER
Prior Authorization Not Needed per Insurance    Medication: Needing clarification for Chorionic Gonadotropin (HCG) 6000 units SOLR   Insurance Company: [employer] - Phone [phone] Fax [phone]  Expected CoPay:      Pharmacy Filling the Rx:    Pharmacy Notified:    Patient Notified:

## 2020-03-13 NOTE — TELEPHONE ENCOUNTER
Pt notified to provide health insurance information to [facility] for processing of Rx order HCG.   America Gold RN on 3/13/2020 at 10:00 AM

## 2020-03-13 NOTE — TELEPHONE ENCOUNTER
Clarification requested from Provider.  Spoke w/ Sarah Pharm tech for [facility] P: [phone] Heva  Only HCG carried is 10,000 multi dose  Verbal order as written per clinic notes and Pharm D    Clinic notes: - 03/11/1010HCG 1500 unit Mon/Wed/Fri

## 2020-03-16 ENCOUNTER — TELEPHONE (OUTPATIENT)
Dept: ENDOCRINOLOGY | Facility: CLINIC | Age: 40
End: 2020-03-16

## 2020-03-16 DIAGNOSIS — E23.0 PANHYPOPITUITARISM (H): ICD-10-CM

## 2020-03-16 DIAGNOSIS — E29.1 SECONDARY MALE HYPOGONADISM: ICD-10-CM

## 2020-03-16 DIAGNOSIS — E22.0 ACROMEGALY (H): ICD-10-CM

## 2020-03-16 NOTE — TELEPHONE ENCOUNTER
[facility] Call Center    Phone Message    May a detailed message be left on voicemail: yes     Reason for Call: Medication Question or concern regarding medication   Prescription Clarification  Name of Medication: Chorionic Gonadotropin (HCG) 6000 units SOL  Prescribing Provider: Terrell   Pharmacy:  Specialty Pharmacy on file    What on the order needs clarification? They are needing a new Rx with the correct solution, 6000units not available.       Action Taken: Message routed to:  [facility] Diabetes Endocrine    Travel Screening: Not Applicable

## 2020-03-17 ENCOUNTER — TELEPHONE (OUTPATIENT)
Dept: ENDOCRINOLOGY | Facility: CLINIC | Age: 40
End: 2020-03-17

## 2020-03-17 DIAGNOSIS — E29.1 SECONDARY MALE HYPOGONADISM: ICD-10-CM

## 2020-03-17 DIAGNOSIS — E23.0 PANHYPOPITUITARISM (H): ICD-10-CM

## 2020-03-17 DIAGNOSIS — E22.0 ACROMEGALY (H): ICD-10-CM

## 2020-03-17 DIAGNOSIS — E22.0 ACROMEGALY (H): Primary | ICD-10-CM

## 2020-03-17 NOTE — TELEPHONE ENCOUNTER
We need a new rx for Chorionic Gonadotropin 10,000u vial.      Thank you  Emmanuelle Chow [facility]

## 2020-03-18 ENCOUNTER — TELEPHONE (OUTPATIENT)
Dept: ENDOCRINOLOGY | Facility: CLINIC | Age: 40
End: 2020-03-18

## 2020-03-18 RX ORDER — CHORIONIC GONADOTROPIN 10000 UNIT
KIT INTRAMUSCULAR
Qty: 10000 UNITS | Refills: 5 | Status: SHIPPED | OUTPATIENT
Start: 2020-03-18 | End: 2020-04-15

## 2020-03-18 NOTE — TELEPHONE ENCOUNTER
Dr Tejada was sent the correct script for HCG needed with 10,000 ml vials . He has to get this through [facility] mail  Order pharmacy . This pharmacy is on the script sent to Dr Tejada to approve . Alex was to contact the pharmacy with an update on insurance for processing . Karma Estrada RN on 3/18/2020 at 4:02 PM

## 2020-03-18 NOTE — TELEPHONE ENCOUNTER
[facility] Call Center    Phone Message    May a detailed message be left on voicemail: yes     Reason for Call: Medication Question or concern regarding medication   Prescription Clarification  Name of Medication: Chorionic Gonadotropin (HCG) 6000 units SOLR  Prescribing Provider: Dr. Tejada   [facility]   What on the order needs clarification? No signature on prescription. Needs to be sent with signature or phoned in before they can fill this prescription.            Action Taken: Message routed to:  [facility] Endo    Travel Screening: Not Applicable

## 2020-04-15 DIAGNOSIS — E23.0 GHD (GROWTH HORMONE DEFICIENCY) (H): ICD-10-CM

## 2020-04-15 DIAGNOSIS — E22.0 ACROMEGALY (H): ICD-10-CM

## 2020-04-17 RX ORDER — CHORIONIC GONADOTROPIN 10000 UNIT
KIT INTRAMUSCULAR
Qty: 10000 UNITS | Refills: 5 | Status: SHIPPED | OUTPATIENT
Start: 2020-04-17 | End: 2021-05-25

## 2020-05-28 DIAGNOSIS — E23.0 HYPOPITUITARISM (H): ICD-10-CM

## 2020-05-28 DIAGNOSIS — E29.1 HYPOGONADISM MALE: ICD-10-CM

## 2020-05-28 NOTE — TELEPHONE ENCOUNTER
dexamethasone (DECADRON) 1 MG tablet       Last Written Prescription Date:  4-4-19  Last Fill Quantity: 90,   # refills: 2  Last Office Visit : 3-11-20  Future Office visit:  none    Routing refill request to provider for review/approval because:  Med not on protocol

## 2020-05-29 RX ORDER — DEXAMETHASONE 1 MG
TABLET ORAL
Qty: 90 TABLET | Refills: 2 | Status: SHIPPED | OUTPATIENT
Start: 2020-05-29 | End: 2021-06-09

## 2020-06-15 ENCOUNTER — MYC REFILL (OUTPATIENT)
Dept: ENDOCRINOLOGY | Facility: CLINIC | Age: 40
End: 2020-06-15

## 2020-06-15 DIAGNOSIS — E23.0 HYPOPITUITARISM (H): ICD-10-CM

## 2020-06-15 RX ORDER — LEVOTHYROXINE SODIUM 125 UG/1
125 TABLET ORAL DAILY
Qty: 90 TABLET | Refills: 3 | Status: SHIPPED | OUTPATIENT
Start: 2020-06-15 | End: 2021-06-09

## 2020-06-15 RX ORDER — TESTOSTERONE CYPIONATE 200 MG/ML
INJECTION, SOLUTION INTRAMUSCULAR
Qty: 6 ML | Refills: 5 | Status: SHIPPED | OUTPATIENT
Start: 2020-06-15 | End: 2020-06-18

## 2020-06-18 ENCOUNTER — MYC REFILL (OUTPATIENT)
Dept: ENDOCRINOLOGY | Facility: CLINIC | Age: 40
End: 2020-06-18

## 2020-06-18 DIAGNOSIS — E23.0 HYPOPITUITARISM (H): ICD-10-CM

## 2020-06-19 RX ORDER — TESTOSTERONE CYPIONATE 200 MG/ML
INJECTION, SOLUTION INTRAMUSCULAR
Qty: 6 ML | Refills: 5 | Status: SHIPPED | OUTPATIENT
Start: 2020-06-19 | End: 2020-12-31

## 2020-07-02 DIAGNOSIS — E23.0 HYPOPITUITARISM (H): ICD-10-CM

## 2020-09-28 ENCOUNTER — MYC MEDICAL ADVICE (OUTPATIENT)
Dept: ENDOCRINOLOGY | Facility: CLINIC | Age: 40
End: 2020-09-28

## 2020-09-28 NOTE — LETTER
September 30, 2020      Ivan D Welander  [address]  [place] [address]        To whom it may concern,    Mr. Ivan Welander (DPOB 1980) is my patient who has hypopituitarism with taking steroid. Given this condition, he is the moderate high risk patient. Therefore I suggest that he and his family (Spouse: Maty InmanPatyLetty) to work from home if situations allow.        If you have any problems or concerns, please call myself or my nurse at the number above.    Sincerely,        Damaris Tejada MD  [facility]  Endocrinology and Diabetes Clinic

## 2020-09-30 NOTE — TELEPHONE ENCOUNTER
09/30/2020 letter from Dr Tejada mailed to Pt via [employer].  America Gold RN on 9/30/2020 at 10:23 AM

## 2020-10-02 ENCOUNTER — TELEPHONE (OUTPATIENT)
Dept: ENDOCRINOLOGY | Facility: CLINIC | Age: 40
End: 2020-10-02

## 2020-10-02 NOTE — TELEPHONE ENCOUNTER
[facility] Call Center    Phone Message    May a detailed message be left on voicemail: yes     Reason for Call: Form or Letter   Type or form/letter needing completion: Patients wife states had sent a Mychart message and wanting the nurses to take a look and respond back, please advise.   Provider: Terrell  Date form needed: asap  Once completed: Fax form to: n/a      Action Taken: Message routed to:  [facility] endo    Travel Screening: Not Applicable

## 2020-10-20 ENCOUNTER — TELEPHONE (OUTPATIENT)
Dept: ENDOCRINOLOGY | Facility: CLINIC | Age: 40
End: 2020-10-20

## 2020-10-20 NOTE — TELEPHONE ENCOUNTER
Prior Authorization Approval    Authorization Effective Date: 9/19/2020  Authorization Expiration Date: 10/19/2020  Medication: NORDITROPIN-APPROVAL  Approved Dose/Quantity: 1/30  Reference #: [id number]   Insurance Company: [employer] - Phone [phone] Fax [phone]  Expected CoPay: UNKNOWN    CoPay Card Available: YES     Foundation Assistance Needed:NO    Which Pharmacy is filling the prescription (Not needed for infusion/clinic administered): [facility] - [place] [address]  Pharmacy Notified: Yes  Patient Notified: Yes

## 2020-10-20 NOTE — TELEPHONE ENCOUNTER
PA Initiation    Medication: NORDITROPIN-PENDING  Insurance Company: [employer] - Phone [phone] Fax [phone]  Pharmacy Filling the Rx: [facility] - [place], [place] - [facility] [address]  Filling Pharmacy Phone: [phone]  Filling Pharmacy Fax: [phone]  Start Date: 10/15/2020

## 2020-11-22 ENCOUNTER — HEALTH MAINTENANCE LETTER (OUTPATIENT)
Age: 40
End: 2020-11-22

## 2020-12-31 DIAGNOSIS — E23.0 HYPOPITUITARISM (H): ICD-10-CM

## 2020-12-31 DIAGNOSIS — E29.1 HYPOGONADISM MALE: ICD-10-CM

## 2020-12-31 RX ORDER — TESTOSTERONE CYPIONATE 200 MG/ML
INJECTION, SOLUTION INTRAMUSCULAR
Qty: 6 ML | Refills: 0 | Status: SHIPPED | OUTPATIENT
Start: 2020-12-31 | End: 2021-03-03

## 2020-12-31 NOTE — TELEPHONE ENCOUNTER
"testosterone cypionate (DEPOTESTOSTERONE) 200 MG/ML injection      Last Written Prescription Date:  6-19-20  Last Fill Quantity: 6 ml,   # refills: 5  Last Office Visit : 3-11-20  Future Office visit:  none    Routing refill request to provider for review/approval because:  Med not on protocol  Overdue labs: hematocrit,PSA        syringe/needle, disp, 23G X 1\" 3 ML MISC  RF 13 SYR:0        "

## 2021-02-01 DIAGNOSIS — E23.0 GROWTH HORMONE DEFICIENCY (H): Primary | ICD-10-CM

## 2021-02-01 DIAGNOSIS — E23.0 HYPOPITUITARISM (H): ICD-10-CM

## 2021-02-01 NOTE — TELEPHONE ENCOUNTER
Centralized Medication Refill Team note:   somatropin (NORDITROPIN FLEXPRO) 5 MG/1.5ML SOLN  Last Written Prescription Date:   7/6/20  Last Fill Quantity: 4.5 ml,   # refills: 5  Last Office Visit : 3/11/20  Future Office visit:  None/ one year recommended    Routing refill request to provider for review/approval because:  Controlled substance  Message per [employer]: Your refill request has been entered and sent to your physician for authorization.

## 2021-02-01 NOTE — TELEPHONE ENCOUNTER
[facility] Call Center    Phone Message    May a detailed message be left on voicemail: yes     Reason for Call: Medication Refill Request    Has the patient contacted the pharmacy for the refill? Yes   Name of medication being requested: somatropin (NORDITROPIN FLEXPRO) 5 MG/1.5ML SOLN  Provider who prescribed the medication: Dr. Tejada  Pharmacy: [facility] - [place], [place] - [address] [place] [address] [facility]  Date medication is needed: ASAP- out by end of this week.  Maty stated insurance changed but did not have updated coverage info available.  Please follow up as needed.    Action Taken: Message routed to:  [facility] endo    Travel Screening: Not Applicable

## 2021-02-02 RX ORDER — SOMATROPIN 5 MG/1.5ML
0.5 INJECTION, SOLUTION SUBCUTANEOUS DAILY
Qty: 4.5 ML | Refills: 5 | Status: SHIPPED | OUTPATIENT
Start: 2021-02-02 | End: 2021-09-16

## 2021-02-04 ENCOUNTER — TELEPHONE (OUTPATIENT)
Dept: ENDOCRINOLOGY | Facility: CLINIC | Age: 41
End: 2021-02-04

## 2021-02-04 RX ORDER — SOMATROPIN 5 MG/1.5ML
INJECTION, SOLUTION SUBCUTANEOUS
Refills: 5 | OUTPATIENT
Start: 2021-02-04

## 2021-02-04 NOTE — CONFIDENTIAL NOTE
Prior Authorization Specialty Medication Request    Medication/Dose:   Somatropin (NORDITROPIN FLEXPRO) 5 MG/1.5ML SOPN 4.5 mL 5 2/2/2021  --   Sig - Route: Inject 0.5 mg Subcutaneous daily - Subcutaneous   Sent to pharmacy as: Norditropin FlexPro 5 MG/1.5ML Subcutaneous Solution Pen-injector (Somatropin)       ICD code (if different than what is on RX):    Previously Tried and Failed:      Important Lab Values:   After medication start:  Insulin Growth Factor 1 by Immunoassay  Order: [id number]  Status:  Final result   Visible to patient:  Yes (MyChart)   Dx:  Acromegaly (H)    Ref Range & Units 11mo ago    Ins Growth Factor 1 83 - 238 ng/ml 120              Rationale: Pt long term stable on medication;39 year old male with acromegaly s/p TSS twice, RT, long term panhypopituitarism     # Acromegaly  Post TSS twice, last MRI 2/2016 post surgical changes.  Pituitary MRI was done in December 2018 no residual tumor.    Insurance Name:   Insurance ID:   Insurance Phone Number:     Pharmacy Information (if different than what is on RX)  Name:    Phone:

## 2021-02-04 NOTE — TELEPHONE ENCOUNTER
Pt has new insurance through [facility] [employer], Prior authorization is needed. Norditropin through [facility] commercial is a plan exclusion, Nutropin is on plans formualry.

## 2021-02-04 NOTE — TELEPHONE ENCOUNTER
NORDITROPIN FLEXPRO 5MG/1.5ML SOPN   Somatropin (NORDITROPIN FLEXPRO) 5 MG/1.5ML SOPN 4.5 mL 5 2/2/2021  --   Sig - Route: Inject 0.5 mg Subcutaneous daily - Subcutaneous   Sent to pharmacy as: Norditropin FlexPro 5 MG/1.5ML Subcutaneous Solution Pen-injector (Somatropin)   Class: E-Prescribe   Order: [id number]   E-Prescribing Status: Receipt confirmed by pharmacy (2/2/2021  2:12 PM CST)   Printout Tracking    External Result Report   Pharmacy    [facility] - [place], [place] - [address]  Central Triage Red Flags/Med Refills

## 2021-02-05 ENCOUNTER — TELEPHONE (OUTPATIENT)
Dept: ENDOCRINOLOGY | Facility: CLINIC | Age: 41
End: 2021-02-05

## 2021-02-05 NOTE — TELEPHONE ENCOUNTER
PA Initiation    Medication: Norditropin-Approved  Insurance Company: [employer] ([facility]) - Phone [phone] Fax [phone]  Pharmacy Filling the Rx: [facility] - [place], [place] - [facility] [address]  Filling Pharmacy Phone: [phone]  Filling Pharmacy Fax: [phone]  Start Date: 2/5/2021

## 2021-02-05 NOTE — TELEPHONE ENCOUNTER
PA denied.  Reason given:             Patient notified:  Yes   Patient informed directly/PT. informed of right to appeal.

## 2021-02-05 NOTE — TELEPHONE ENCOUNTER
[facility] Call Center    Phone Message    May a detailed message be left on voicemail: yes     Reason for Call: Other: Maty stated she received notification that prior auth for Somatropin (NORDITROPIN FLEXPRO) 5 MG/1.5ML SOPN was denied.  Pt mentioned being on Nutropin before.  Pt would like to know what options are.  Maty also stated she thinks testosterone cypionate (DEPOTESTOSTERONE) 200 MG/ML injection will need a PA as well.  Pt. Is running out of Norditropin and would like a call back to discuss options.  Please call.    Action Taken: Message routed to:  [facility] endo    Travel Screening: Not Applicable

## 2021-02-07 NOTE — TELEPHONE ENCOUNTER
Carolyn , Are you working on this PA ? What is needed ? Karma Estrada RN on 2/7/2021 at 12:05 PM

## 2021-02-15 NOTE — TELEPHONE ENCOUNTER
[facility] Call Center    Phone Message    May a detailed message be left on voicemail: yes     Reason for Call: Other: Pt wife called and gave the updated insurance information. Pt no longer on [employer]. Switched to [facility] as of 02/1/2020.  pt chart is now updated with this information.   Action Taken: Message routed to:  [facility] Endocrine    Travel Screening: Not Applicable

## 2021-02-17 NOTE — CONFIDENTIAL NOTE
Clinical Letter Summary    Letter from: Damaris Tejada   Letter    Damaris Tejada MD on 2021           [facility] [place] [id number] [address]  Phone: [phone]  Fax: [phone]         2021        Ivan D Welander  [address]  [facility] [address]     To whom it may concern,     Mr. Ivan Welander ( 1980) is my patient with pituitary adenoma and panhypopituitarism.   Briefly he was diagnosed pituitary adenoma in .  In 2009 first surgery was done at [facility] followed by brain radiation.   Patient underwent his second pituitary surgery 2015 at [facility] ([facility]), since then he developed panhypopituitarism. He was diagnosed growth hormone deficiency in  at [facility], and since then he has been on growth hormone injection and he needs chronic term treatment for his hypipituitarism and growth hormone deficiency.            If you have any question, please call at [phone].      Sincerely           Damaris Tejada MD  [facility]  Endocrinology and Diabetes Clinic           The [facility] represents a collaboration between [facility] and [facility].        RE: Ivan D Welander    MRN: [id number]   : 1980   ENC DATE: 2021   PAGE:          Letter (Out)  2021  Ivan D Welander   MRN: [id number]   Letter by Damaris Tejada MD on 2021 (Status: Open)           [facility] [address] [address]  Phone: [phone]  Fax: [phone]         2021        Ivan D Welander  [address]  [facility] [address]     To whom it may concern,     Mr. Ivan Welander ( 1980) is my patient with pituitary adenoma and panhypopituitarism.   Briefly he was diagnosed pituitary  adenoma in .  In 2009 first surgery was done at [facility] followed by brain radiation.   Patient underwent his second pituitary surgery 2015 at [facility] ([facility]), since then he developed panhypopituitarism. He was diagnosed growth hormone deficiency in  at [facility], and since then he has been on growth hormone injection and he needs chronic term treatment for his hypipituitarism and growth hormone deficiency.            If you have any question, please call at [phone].      Sincerely           Damaris Tejada MD  [facility]  Endocrinology and Diabetes Clinic           The [facility] represents a collaboration between [facility] and [facility].        RE: Ivan D Welander    MRN: [id number]   : 1980   ENC DATE: 2021   PAGE:

## 2021-02-18 NOTE — TELEPHONE ENCOUNTER
Medication Appeal Initiation    We have initiated an appeal for the requested medication:  Medication: Somatropin (NORDITROPIN FLEXPRO) 5 MG/1.5ML SOPN- DENIED  Appeal Start Date:2/18/2021     Insurance Company: [facility] [employer]  Comments: Letter was faxed to [facility] Appeals dept ([phone])

## 2021-02-26 ENCOUNTER — TELEPHONE (OUTPATIENT)
Dept: ENDOCRINOLOGY | Facility: CLINIC | Age: 41
End: 2021-02-26

## 2021-02-26 DIAGNOSIS — E23.0 HYPOPITUITARISM (H): ICD-10-CM

## 2021-02-26 NOTE — TELEPHONE ENCOUNTER
Prior Authorization Retail Medication Request    Medication/Dose: testosterone cypionate (DEPOTESTOSTERONE) 200 MG/ML injection  ICD code (if different than what is on RX):E23.0  Rationale:Patient needs for Hypopituitarism     Insurance Name:[employer]   Insurance ID:[id number]       Pharmacy Information (if different than what is on RX)  Name:   Phone:

## 2021-02-26 NOTE — TELEPHONE ENCOUNTER
PA Initiation    Medication: testosterone cypionate (DEPOTESTOSTERONE) 200 MG/ML inj -   Insurance Company: [employer] ([facility]) - Phone [phone] Fax [phone]  Pharmacy Filling the Rx: [facility] #[id number] - [place], [place] - [address] AT [facility] [address]  Filling Pharmacy Phone: [phone]  Filling Pharmacy Fax: [phone]  Start Date: 2/26/2021

## 2021-03-01 ENCOUNTER — MYC MEDICAL ADVICE (OUTPATIENT)
Dept: ENDOCRINOLOGY | Facility: CLINIC | Age: 41
End: 2021-03-01

## 2021-03-01 PROBLEM — E29.1 HYPOGONADISM MALE: Status: ACTIVE | Noted: 2021-03-01

## 2021-03-01 PROBLEM — E23.0 GROWTH HORMONE DEFICIENCY (H): Status: ACTIVE | Noted: 2021-03-01

## 2021-03-03 NOTE — TELEPHONE ENCOUNTER
"Pharmacy states that they don't have an active Rx. ([facility] states the med was \"closed\" by the provider but doesn't give a reason why)  Please send new Rx to pts pharmacy.    Prior Authorization Not Needed per Insurance    Medication: testosterone cypionate (DEPOTESTOSTERONE) 200 MG/ML inj -   Insurance Company: [employer] ([facility]) - Phone [phone] Fax [phone]  Expected CoPay:      Pharmacy Filling the Rx: [facility] #[id number] - [place], [place] - [address] AT [facility] [address]  Pharmacy Notified: Yes  Patient Notified: Comment:  **Instructed pharmacy to notify patient when script is ready to /ship.**      "

## 2021-03-04 ENCOUNTER — TELEPHONE (OUTPATIENT)
Dept: ENDOCRINOLOGY | Facility: CLINIC | Age: 41
End: 2021-03-04

## 2021-03-04 DIAGNOSIS — Z53.9 ERRONEOUS ENCOUNTER--DISREGARD: Primary | ICD-10-CM

## 2021-03-04 NOTE — TELEPHONE ENCOUNTER
Spoke w/ Pt and Pt's wife. Pt provided verbal permission to speak with spouse.   Need Letter of Appeal rewritten:  Addended latter faxed to [facility] copy labeled to scan..  Provider notified.  Pt notified.   America Gold RN on 3/4/2021 at 11:45 AM     RE    NORDITROPIN FLEXPRO: growth Hormone, long term stable for years.   End of January changed to new insurance. Hs been approved previously. Pa submitted PA in February. Not FDA approved diagnosis. Appeal needs more information than what's on the letter. Need supporting documentation. Need a statement of clear intent. How long he will be using Rx and what exactly it's for, was approved last year, and on the letter state this is an appeal to prior auth # [id number].  [facility] Appeals dept ([phone])    Appt next week with Dr Tejada.

## 2021-03-04 NOTE — LETTER
Coverage Information    Coverage information:     Subscriber: [id number] WELANDER,IVAN D     Rel to sub: 01 - Self     Member ID: [id number]     Payor: [facility] Ph: [phone]     Benefit plan: [employer] [facility] Ph: [phone]     Group number: [id number]     Member effective dates: from 21      This is an appeal to prior auth # [id number].  [facility] Appeals dept fax ([phone])    RE    Ivan D Welander  [address]  [facility] [address]    To whom it may concern,    Mr. Ivan Welander ( 1980) is my patient with pituitary adenoma and panhypopituitarism.     Briefly he was diagnosed pituitary adenoma in .  In 2009 first surgery was done at [facility] followed by brain radiation.   Patient underwent his second pituitary surgery 2015 at [facility] ([facility]), since then he developed panhypopituitarism. He was diagnosed growth hormone deficiency in  at [facility], and since then he has been on growth hormone injection and he needs chronic long term treatment for his hypipituitarism and growth hormone deficiency.     Patient has been previously approved for and received prior authorization for NORDITROPIN FLEXPRO and has been long term stable on this medication for years. Clearly, the intention for this medication is he will need to be a life-long medication. Adults diagnosed with growth hormone deficiency (GHD) do not produce sufficient growth hormone. Growth hormone is made in the brain by the pituitary gland. It s responsible for more than just your height; growth hormone also helps keep your bones, muscles, and metabolism in balance. The FDA has approved norditropin for life-long treatment of GHD.    Please see included chart notes.     If you have any question, please call at [phone].     Sincerely        Damaris Tejada MD  [facility]  Endocrinology and Diabetes Clinic

## 2021-03-08 RX ORDER — TESTOSTERONE CYPIONATE 200 MG/ML
INJECTION, SOLUTION INTRAMUSCULAR
Qty: 6 ML | Refills: 1 | Status: SHIPPED | OUTPATIENT
Start: 2021-03-08 | End: 2021-12-22

## 2021-03-09 NOTE — PROGRESS NOTES
hypogonadism male  Luis Armando Lopez, JOHN      Alex is a 40 year old who is being evaluated via a billable video visit.      How would you like to obtain your AVS? MyChart  If the video visit is dropped, the invitation should be resent by: Send to e-mail at: [email or web address]  Will anyone else be joining your video visit? Yes: wife. How would they like to receive their invitation? Send to e-mail at: [email or web address]       Video start 8:30 am  End 9:00 am  Total care and encountner time including reviewing chart, outside record, coordinating care 45 minutes                                                                               - Endocrinology Follow up -    Reason for visit/consult:  acromegaly    Primary care provider: No Ref-Primary, Physician    Assessment and Plan  A 40 year old male with acromegaly s/p TSS twice, RT, long term panhypopituitarism    # Acromegaly  Post TSS twice, last MRI 2/2016 post surgical changes.  Pituitary MRI was done in December 2018 no residual tumor.  We will do next follow-up MRI end of this year 2021 (ordered)    - IGF1 level to check (current no GH therapy)     # Secondary adrenal insufficiency  Since patient feels well , he prefers to stay on stay Dexamethasone 0.5 mg daily, rather than switching to hydrocortisone.  A1C 5.1 good.     - continue Dexamethasone 0.5 mg daily    - Sol-cotef PRN      # Secondary hypothryoidism  Free T4 good range   -Continue current dose of levothyroxine 125 mcg daily.    # Secondary hypogonadism  Currently long standing testosterone injection 100 mg every week   Considering family planning, that he tried before, we will back to HCG therapy (previsously [facility] in 2017 he was on HCG 1500 every other day)    - HCG 1500 unit Mon/Wed/Fri (was planned for family's planning but was postponed due to COVID, family will let us know and then prescribe again, meanwhile maintain current testosterone 100 mg weekly      # GH deficiecy  Acromegaly, post TSS and  RT, no residual.was started at [facility] for GH deficiency given no recurrence and colon cancer no recurrence.  Controversial but with this helping his quality of life and no active contraindicated issue on going, therefore I agreed to continue.    IGF1 previous was normal range with GH injection.  good range, not overdosed.       -Continue current dose of growth hormone 0.5 mg subcu daily. Still working on insurance approval    # history of colon cancer  Stable.    - GI referral     # Previous cardiac echo  Patient and family request to be followed by cardiology and referral,     - cardiology referral, non-urgent.         - RTC with me in 1 year.           Damaris Tejada MD  Staff Physician  Endocrinology and Metabolism  License: [id number]    Interval History as of 3/10/2021 : Patient has been doing well. Ran out GH for 2-3 weeks. Still on process of approval by new insurance. Otherwise doing well and stable.   Interval History as of 3/11/2020 : Patient has been doing well. No major medical event. Considering family planning, that he tried before, we will back to HCG therapy (previsously [facility] in 2017 he was on HCG 1500 every other day)  Interval history ; patient has been doing well, however felt a little bit weak since he is off growth hormone injection.  Therefore he was about to resume growth hormone again.  Also he has been missing testosterone injection mentioned some issue his insurance company in the pharmacy?  Otherwise has been stable taking dexamethasone 0.5 mg for adrenal insufficiency.  MRI negative.  HPI: A 39 yo male with acromegaly, here for the care transition due to interstate move.  He moved in 7/2018 from [place], originally from [place].    He was diagnosed Acromegaly in 2007, during the regular physical check up in [facility], and screened and diagnosed. Patient recalls that his pituitary tumor was 1 inch or so.   In 12/2009 first surgery was done at [facility], RT was done after  "that in [place] 6/2010.   2/2011 he underwent colonoscopy for Colon cancer screening, and results positive for malignancy, then underwent sigmoid resection.     Patient moved to [place], residual tumor detected. his second surgery 12/2015 at [facility] for residual tumor.     Patient has been taking Dexamethasone, testosterone, GH for his panhypopituitarism started since 2010. He used to be on Hydrocortisone, however tends to forgets afternoon dose, then switched to dexamethasone 0.5 mg in [place] due to missing dose.   Levothyroxine 125 mcg. Testosterone injection every week 100 mg.   Growth hormone (neutropin) 0.5 mg daily since 2017.   In [facility] 2018, since he was missing dexamethasone for busy baby care, he went to [facility] for sickness.     Last visit 3/20/2018 [facility] Neurosurgery Dr. Parada visit for GHD.     4/2017 colonoscoy: OK,     Past Medical/Surgical History:  Past Medical History:   Diagnosis Date     Cancer (H)     Colon     Thyroid disease     Acromegaly     Past Surgical History:   Procedure Laterality Date     APPENDECTOMY       COLONOSCOPY       GI SURGERY      sigmoid colectomy     HEAD & NECK SURGERY      transphenoidal hypophysectomy       Allergies:  No Known Allergies    Current Medications   Current Outpatient Medications   Medication     dexamethasone (DECADRON) 1 MG tablet     levothyroxine (SYNTHROID/LEVOTHROID) 125 MCG tablet     Sildenafil Citrate (VIAGRA PO)     somatropin (NORDITROPIN FLEXPRO) 5 MG/1.5ML SOLN     Somatropin (NORDITROPIN FLEXPRO) 5 MG/1.5ML SOPN     somatropin (NUTROPIN AQ 10) 10 MG/2ML SOLN PEN injection     Somatropin 5 MG/2ML SOLN     syringe/needle, disp, 23G X 1\" 3 ML MISC     testosterone cypionate (DEPOTESTOSTERONE) 200 MG/ML injection     Chorionic Gonadotropin (HCG) 6000 units SOLR     chorionic gonadotropin (NOVAREL) 77763 units IM SOLR     No current facility-administered medications for this visit.        Family History:  Family History   Problem Relation Age " of Onset     Rheumatoid Arthritis Mother      Rheumatoid Arthritis Other      Diabetes No family hx of      Coronary Artery Disease No family hx of      Hypertension No family hx of      Hyperlipidemia No family hx of      Cerebrovascular Disease No family hx of      Breast Cancer No family hx of      Colon Cancer No family hx of      Prostate Cancer No family hx of      Other Cancer No family hx of      Depression No family hx of      Anxiety Disorder No family hx of      Mental Illness No family hx of      Substance Abuse No family hx of      Anesthesia Reaction No family hx of      Asthma No family hx of      Osteoporosis No family hx of      Genetic Disorder No family hx of      Thyroid Disease No family hx of      Obesity No family hx of      Unknown/Adopted No family hx of    Maternal uncle: some acromegalic feature    Social History:  Social History     Tobacco Use     Smoking status: Never Smoker     Smokeless tobacco: Never Used   Substance Use Topics     Alcohol use: Yes     Comment: Only so often       ROS:  Full review of systems taken with the help of the intake sheet. Otherwise a complete 14 point review of systems was taken and is negative unless stated in the history above.      Physical Exam:   There were no vitals taken for this visit.  General: well appearing, no acute distress, pleasant and conversant,   Mental Status/neuro: alert and oriented  Face: symmetrical, normal facial color  Eyes: anicteric, PERRL, no proptosis or lid lag  Neck: suppler, no lymphadenopahty  Thyroid: normal size and texture, no nodule palpable, no bruits  Hand/finger: enlarged (per patient was bigger before and his ring recently loosened and fell)  Heart: regular rhythm, S1S2, no murmur appreciated  Chest: no gynecomastia, skin tags several +  Lung: clear to auscultation bilaterally  Abdomen: soft, NT/ND, no hepatomegaly  Legs: no swelling or edema      Labs : I reviewed data from epic and extract and summarize the  pertinent data here.               1/2018  TSH   0.02  Cortisol 5.1       2/19/2020 09:28   Sodium 138   Potassium 3.4   Chloride 104   Carbon Dioxide 29   Urea Nitrogen 8   Creatinine 0.87   GFR Estimate >90   GFR Estimate If Black >90   Calcium 8.5   Anion Gap 5   Albumin 3.7   Protein Total 7.1   Bilirubin Total 0.6   Alkaline Phosphatase 52   ALT 20   AST 19   Hemoglobin A1C 5.1   Cholesterol 238 (H)   FSH 1.0   HDL Cholesterol 77   LDL Cholesterol Calculated 140 (H)   Non HDL Cholesterol 161 (H)   T4 Free 1.27   Testosterone Total 972 (H)   Triglycerides 104   TSH 0.03 (L)   Glucose 84   Ins Growth Factor 1 120   Lutropin 0.2 (L)     MRI Brain: I personally reviewed the original images and agree with the below reports.   Brain/ Pituitary MRI without and with contrast     History: Acromegaly (H). 38 year old male with?acromegaly s/p TSS  twice, RT, long term panhypopituitarism. Post TSS  last MRI 2/2016  post surgical changes (no prior images available.)     ICD-10: Acromegaly (H)     Comparison:  None      Technique: Axial diffusion and FLAIR images of the whole brain  obtained without intravenous contrast. Sagittal T1 and T2-weighted,  coronal T2-weighted, coronal T1-weighted images with focus on the  sella were obtained without intravenous contrast. Post intravenous  contrast using gadolinium coronal and sagittal T1-weighted images were  obtained focused on the sella. Dynamic postcontrast coronal  T1-weighted images were also obtained.     Contrast: 7.5 mL Gadavist     Findings:       Postsurgical changes of transsphenoidal surgery. There are 2 foci of  T1 hyperintensity in the sella turcica without evidence of enhancement  (series 100, image 11). Few scattered punctate white matter T2  hyperintense foci.     No mass is demonstrated within the sella. The pituitary stalk appears  midline. The optic chiasm appears intact and not displaced.  The major cavernous carotid vascular flow-voids appear patent.        FLAIR images through the whole brain are unremarkable, and demonstrate  no mass effect, midline shift, or significant enlargement of the  ventricles. Small mucous retention cysts in the left and right  maxillary sinuses.                                                                      Impression:  1. Postsurgical changes of transsphenoidal resection. No evidence of  recurrence.  2. Few scattered punctate white matter T2 hyperintense foci,  nonspecific.

## 2021-03-10 ENCOUNTER — VIRTUAL VISIT (OUTPATIENT)
Dept: ENDOCRINOLOGY | Facility: CLINIC | Age: 41
End: 2021-03-10
Payer: COMMERCIAL

## 2021-03-10 ENCOUNTER — TELEPHONE (OUTPATIENT)
Dept: ENDOCRINOLOGY | Facility: CLINIC | Age: 41
End: 2021-03-10

## 2021-03-10 DIAGNOSIS — E23.0 HYPOPITUITARISM (H): Primary | ICD-10-CM

## 2021-03-10 DIAGNOSIS — E22.0 ACROMEGALY (H): ICD-10-CM

## 2021-03-10 DIAGNOSIS — C18.9 MALIGNANT NEOPLASM OF COLON, UNSPECIFIED PART OF COLON (H): ICD-10-CM

## 2021-03-10 PROCEDURE — 99215 OFFICE O/P EST HI 40 MIN: CPT | Mod: GT | Performed by: INTERNAL MEDICINE

## 2021-03-10 RX ORDER — HYDROCORTISONE SODIUM SUCCINATE 100 MG/2ML
100 INJECTION, POWDER, FOR SOLUTION INTRAMUSCULAR; INTRAVENOUS ONCE
Qty: 2 ML | Refills: 1 | Status: SHIPPED | OUTPATIENT
Start: 2021-03-10 | End: 2023-03-03

## 2021-03-10 NOTE — LETTER
3/10/2021       RE: Ivan D Welander  [address]  [facility] [address]     Dear Colleague,    Thank you for referring your patient, Ivan D Welander, to the [facility] ENDOCRINOLOGY CLINIC [place] at [facility]. Please see a copy of my visit note below.    hypogonadism male  Luis Armando Lopez, JOHN Johnson is a 40 year old who is being evaluated via a billable video visit.      How would you like to obtain your AVS? MyChart  If the video visit is dropped, the invitation should be resent by: Send to e-mail at: [email or web address]  Will anyone else be joining your video visit? Yes: wife. How would they like to receive their invitation? Send to e-mail at: [email or web address]       Video start 8:30 am  End 9:00 am  Total care and encountner time including reviewing chart, outside record, coordinating care 45 minutes                                                                               - Endocrinology Follow up -    Reason for visit/consult:  acromegaly    Primary care provider: No Ref-Primary, Physician    Assessment and Plan  A 40 year old male with acromegaly s/p TSS twice, RT, long term panhypopituitarism    # Acromegaly  Post TSS twice, last MRI 2/2016 post surgical changes.  Pituitary MRI was done in December 2018 no residual tumor.  We will do next follow-up MRI end of this year 2021 (ordered)    - IGF1 level to check (current no GH therapy)     # Secondary adrenal insufficiency  Since patient feels well , he prefers to stay on stay Dexamethasone 0.5 mg daily, rather than switching to hydrocortisone.  A1C 5.1 good.     - continue Dexamethasone 0.5 mg daily    - Sol-cotef PRN      # Secondary hypothryoidism  Free T4 good range   -Continue current dose of levothyroxine 125 mcg daily.    # Secondary hypogonadism  Currently long standing testosterone injection 100 mg every week   Considering family planning, that he tried before, we will back to HCG  therapy (previsously [facility] in 2017 he was on HCG 1500 every other day)    - HCG 1500 unit Mon/Wed/Fri (was planned for family's planning but was postponed due to COVID, family will let us know and then prescribe again, meanwhile maintain current testosterone 100 mg weekly      # GH deficiecy  Acromegaly, post TSS and RT, no residual.was started at [facility] for GH deficiency given no recurrence and colon cancer no recurrence.  Controversial but with this helping his quality of life and no active contraindicated issue on going, therefore I agreed to continue.    IGF1 previous was normal range with GH injection.  good range, not overdosed.       -Continue current dose of growth hormone 0.5 mg subcu daily. Still working on insurance approval    # history of colon cancer  Stable.    - GI referral     # Previous cardiac echo  Patient and family request to be followed by cardiology and referral,     - cardiology referral, non-urgent.         - RTC with me in 1 year.           Damaris Tejada MD  Staff Physician  Endocrinology and Metabolism  License: [id number]    Interval History as of 3/10/2021 : Patient has been doing well. Ran out GH for 2-3 weeks. Still on process of approval by new insurance. Otherwise doing well and stable.   Interval History as of 3/11/2020 : Patient has been doing well. No major medical event. Considering family planning, that he tried before, we will back to HCG therapy (previsously [facility] in 2017 he was on HCG 1500 every other day)  Interval history ; patient has been doing well, however felt a little bit weak since he is off growth hormone injection.  Therefore he was about to resume growth hormone again.  Also he has been missing testosterone injection mentioned some issue his insurance company in the pharmacy?  Otherwise has been stable taking dexamethasone 0.5 mg for adrenal insufficiency.  MRI negative.  HPI: A 37 yo male with acromegaly, here for the care transition due to interstate move.  He  moved in 7/2018 from [place], originally from [place].    He was diagnosed Acromegaly in 2007, during the regular physical check up in [facility], and screened and diagnosed. Patient recalls that his pituitary tumor was 1 inch or so.   In 12/2009 first surgery was done at [facility], RT was done after that in [place] 6/2010.   2/2011 he underwent colonoscopy for Colon cancer screening, and results positive for malignancy, then underwent sigmoid resection.     Patient moved to [place], residual tumor detected. his second surgery 12/2015 at [facility] for residual tumor.     Patient has been taking Dexamethasone, testosterone, GH for his panhypopituitarism started since 2010. He used to be on Hydrocortisone, however tends to forgets afternoon dose, then switched to dexamethasone 0.5 mg in [place] due to missing dose.   Levothyroxine 125 mcg. Testosterone injection every week 100 mg.   Growth hormone (neutropin) 0.5 mg daily since 2017.   In earliyer 2018, since he was missing dexamethasone for busy baby care, he went to [facility] for sickness.     Last visit 3/20/2018 [facility] Neurosurgery Dr. Parada visit for GHD.     4/2017 colonoscoy: OK,     Past Medical/Surgical History:  Past Medical History:   Diagnosis Date     Cancer (H)     Colon     Thyroid disease     Acromegaly     Past Surgical History:   Procedure Laterality Date     APPENDECTOMY       COLONOSCOPY       GI SURGERY      sigmoid colectomy     HEAD & NECK SURGERY      transphenoidal hypophysectomy       Allergies:  No Known Allergies    Current Medications   Current Outpatient Medications   Medication     dexamethasone (DECADRON) 1 MG tablet     levothyroxine (SYNTHROID/LEVOTHROID) 125 MCG tablet     Sildenafil Citrate (VIAGRA PO)     somatropin (NORDITROPIN FLEXPRO) 5 MG/1.5ML SOLN     Somatropin (NORDITROPIN FLEXPRO) 5 MG/1.5ML SOPN     somatropin (NUTROPIN AQ 10) 10 MG/2ML SOLN PEN injection     Somatropin 5 MG/2ML SOLN     syringe/needle, disp, 23G  "X 1\" 3 ML MISC     testosterone cypionate (DEPOTESTOSTERONE) 200 MG/ML injection     Chorionic Gonadotropin (HCG) 6000 units SOLR     chorionic gonadotropin (NOVAREL) 51613 units IM SOLR     No current facility-administered medications for this visit.        Family History:  Family History   Problem Relation Age of Onset     Rheumatoid Arthritis Mother      Rheumatoid Arthritis Other      Diabetes No family hx of      Coronary Artery Disease No family hx of      Hypertension No family hx of      Hyperlipidemia No family hx of      Cerebrovascular Disease No family hx of      Breast Cancer No family hx of      Colon Cancer No family hx of      Prostate Cancer No family hx of      Other Cancer No family hx of      Depression No family hx of      Anxiety Disorder No family hx of      Mental Illness No family hx of      Substance Abuse No family hx of      Anesthesia Reaction No family hx of      Asthma No family hx of      Osteoporosis No family hx of      Genetic Disorder No family hx of      Thyroid Disease No family hx of      Obesity No family hx of      Unknown/Adopted No family hx of    Maternal uncle: some acromegalic feature    Social History:  Social History     Tobacco Use     Smoking status: Never Smoker     Smokeless tobacco: Never Used   Substance Use Topics     Alcohol use: Yes     Comment: Only so often       ROS:  Full review of systems taken with the help of the intake sheet. Otherwise a complete 14 point review of systems was taken and is negative unless stated in the history above.      Physical Exam:   There were no vitals taken for this visit.  General: well appearing, no acute distress, pleasant and conversant,   Mental Status/neuro: alert and oriented  Face: symmetrical, normal facial color  Eyes: anicteric, PERRL, no proptosis or lid lag  Neck: suppler, no lymphadenopahty  Thyroid: normal size and texture, no nodule palpable, no bruits  Hand/finger: enlarged (per patient was bigger before and " his ring recently loosened and fell)  Heart: regular rhythm, S1S2, no murmur appreciated  Chest: no gynecomastia, skin tags several +  Lung: clear to auscultation bilaterally  Abdomen: soft, NT/ND, no hepatomegaly  Legs: no swelling or edema      Labs : I reviewed data from epic and extract and summarize the pertinent data here.               1/2018  TSH   0.02  Cortisol 5.1       2/19/2020 09:28   Sodium 138   Potassium 3.4   Chloride 104   Carbon Dioxide 29   Urea Nitrogen 8   Creatinine 0.87   GFR Estimate >90   GFR Estimate If Black >90   Calcium 8.5   Anion Gap 5   Albumin 3.7   Protein Total 7.1   Bilirubin Total 0.6   Alkaline Phosphatase 52   ALT 20   AST 19   Hemoglobin A1C 5.1   Cholesterol 238 (H)   FSH 1.0   HDL Cholesterol 77   LDL Cholesterol Calculated 140 (H)   Non HDL Cholesterol 161 (H)   T4 Free 1.27   Testosterone Total 972 (H)   Triglycerides 104   TSH 0.03 (L)   Glucose 84   Ins Growth Factor 1 120   Lutropin 0.2 (L)     MRI Brain: I personally reviewed the original images and agree with the below reports.   Brain/ Pituitary MRI without and with contrast     History: Acromegaly (H). 38 year old male with?acromegaly s/p TSS  twice, RT, long term panhypopituitarism. Post TSS  last MRI 2/2016  post surgical changes (no prior images available.)     ICD-10: Acromegaly (H)     Comparison:  None      Technique: Axial diffusion and FLAIR images of the whole brain  obtained without intravenous contrast. Sagittal T1 and T2-weighted,  coronal T2-weighted, coronal T1-weighted images with focus on the  sella were obtained without intravenous contrast. Post intravenous  contrast using gadolinium coronal and sagittal T1-weighted images were  obtained focused on the sella. Dynamic postcontrast coronal  T1-weighted images were also obtained.     Contrast: 7.5 mL Gadavist     Findings:       Postsurgical changes of transsphenoidal surgery. There are 2 foci of  T1 hyperintensity in the sella turcica without  evidence of enhancement  (series 100, image 11). Few scattered punctate white matter T2  hyperintense foci.     No mass is demonstrated within the sella. The pituitary stalk appears  midline. The optic chiasm appears intact and not displaced.  The major cavernous carotid vascular flow-voids appear patent.       FLAIR images through the whole brain are unremarkable, and demonstrate  no mass effect, midline shift, or significant enlargement of the  ventricles. Small mucous retention cysts in the left and right  maxillary sinuses.                                                                      Impression:  1. Postsurgical changes of transsphenoidal resection. No evidence of  recurrence.  2. Few scattered punctate white matter T2 hyperintense foci,  nonspecific.

## 2021-03-10 NOTE — TELEPHONE ENCOUNTER
Appeal letter marked urgent  # [id number] refaxed to [phone] previous  Fax was not received per wife's phone call Karma Estrada RN on 3/10/2021 at 10:00 AM

## 2021-03-11 ENCOUNTER — TELEPHONE (OUTPATIENT)
Dept: CARDIOLOGY | Facility: CLINIC | Age: 41
End: 2021-03-11

## 2021-03-11 NOTE — TELEPHONE ENCOUNTER
Pt needs to schedule an appointment with a general cardiologist per a referral.    Please link cardiology eval adult referral to appointment when scheduling, thank you.

## 2021-03-12 ENCOUNTER — TELEPHONE (OUTPATIENT)
Dept: CARDIOLOGY | Facility: CLINIC | Age: 41
End: 2021-03-12

## 2021-03-12 NOTE — TELEPHONE ENCOUNTER
Pt needs to schedule an appointment with a general cardiologist per a referral.    The referral has been cancelled, can be found in the finalized request tab in the appointment desk. Please reinstate request and link to appointment when scheduling.

## 2021-03-18 NOTE — TELEPHONE ENCOUNTER
MEDICATION APPEAL APPROVED    Medication: Somatropin (NORDITROPIN FLEXPRO) 5 MG/1.5ML SOPN- DENIED  Authorization Effective Date: 3/15/2021  Authorization Expiration Date: 3/15/2022  Approved Dose/Quantity: 3/30  Reference #: N/A    Insurance Company:[facility] [employer]   Expected CoPay:$0       CoPay Card Available:Yes      Foundation Assistance Needed:No    Which Pharmacy is filling the prescription (Not needed for infusion/clinic administered): [facility] - [place], [place] - [address]    Approval letter has not been received at this time

## 2021-03-30 NOTE — TELEPHONE ENCOUNTER
Per patients wife, insurance will be changing again in the near future so they declined filling medication for Norditropin at this time as they will have new insurance with deductible to meet later and don't want to have to pay for deductible with current insurance and soon to be new insurance. She will call with updated insurance again once this is active.

## 2021-04-10 NOTE — TELEPHONE ENCOUNTER
REFERRAL INFORMATION:    Referring Provider:  Dr. Tejada     Referring Clinic:  [facility]     Reason for Visit/Diagnosis: GI Polyps      FUTURE VISIT INFORMATION:    Appointment Date: 4/29/2021    Appointment Time: 10 AM      NOTES STATUS DETAILS   OFFICE NOTE from Referring Provider Internal 3/10/2021 Office visit with Dr. Tejada      OFFICE NOTE from Other Specialist Care Everywhere 12/12/18 Office visit with Dr. Sheldon Henley (\[facility]\"" Internal Medicine)     11/29/16 Office visit with Dr. Suhail Wasserman ([facility] GI)    HOSPITAL DISCHARGE SUMMARY/  ED VISITS N/A    OPERATIVE REPORT N/A    MEDICATION LIST Internal         ENDOSCOPY  N/A    COLONOSCOPY Care Everywhere 4/17/17 ([facility])   7/10/14, 3/14/12 ([facility])    ERCP N/A    EUS N/A    STOOL TESTING N/A    PERTINENT LABS Internal/ Care Everywhere    PATHOLOGY REPORTS (RELATED) Care Everywhere 3/14/12   IMAGING (CT, MRI, EGD, MRCP, Small Bowel Follow Through/SBT, MR/CT Enterography) N/A

## 2021-04-29 ENCOUNTER — PRE VISIT (OUTPATIENT)
Dept: GASTROENTEROLOGY | Facility: CLINIC | Age: 41
End: 2021-04-29

## 2021-05-14 DIAGNOSIS — E22.0 ACROMEGALY (H): ICD-10-CM

## 2021-05-14 DIAGNOSIS — E23.0 HYPOPITUITARISM (H): ICD-10-CM

## 2021-05-14 LAB
IGF-I BLD-MCNC: 90 NG/ML (ref 82–237)
T4 FREE SERPL-MCNC: 1.1 NG/DL (ref 0.76–1.46)
TSH SERPL DL<=0.005 MIU/L-ACNC: 0.07 MU/L (ref 0.4–4)

## 2021-05-14 PROCEDURE — 36415 COLL VENOUS BLD VENIPUNCTURE: CPT | Performed by: PATHOLOGY

## 2021-05-14 PROCEDURE — 84443 ASSAY THYROID STIM HORMONE: CPT | Performed by: PATHOLOGY

## 2021-05-14 PROCEDURE — 84403 ASSAY OF TOTAL TESTOSTERONE: CPT | Mod: 90 | Performed by: PATHOLOGY

## 2021-05-14 PROCEDURE — 84305 ASSAY OF SOMATOMEDIN: CPT | Performed by: PATHOLOGY

## 2021-05-14 PROCEDURE — 99000 SPECIMEN HANDLING OFFICE-LAB: CPT | Performed by: PATHOLOGY

## 2021-05-14 PROCEDURE — 84439 ASSAY OF FREE THYROXINE: CPT | Performed by: PATHOLOGY

## 2021-05-15 LAB — TESTOST SERPL-MCNC: 1415 NG/DL (ref 240–950)

## 2021-05-25 ENCOUNTER — VIRTUAL VISIT (OUTPATIENT)
Dept: FAMILY MEDICINE | Facility: CLINIC | Age: 41
End: 2021-05-25
Payer: COMMERCIAL

## 2021-05-25 DIAGNOSIS — Z11.59 ENCOUNTER FOR HEPATITIS C SCREENING TEST FOR LOW RISK PATIENT: ICD-10-CM

## 2021-05-25 DIAGNOSIS — Z13.6 CARDIOVASCULAR SCREENING; LDL GOAL LESS THAN 160: ICD-10-CM

## 2021-05-25 DIAGNOSIS — E23.0 GROWTH HORMONE DEFICIENCY (H): ICD-10-CM

## 2021-05-25 DIAGNOSIS — E22.0 ACROMEGALY (H): Primary | ICD-10-CM

## 2021-05-25 DIAGNOSIS — F33.1 MODERATE EPISODE OF RECURRENT MAJOR DEPRESSIVE DISORDER (H): ICD-10-CM

## 2021-05-25 DIAGNOSIS — Z11.4 SCREENING FOR HIV (HUMAN IMMUNODEFICIENCY VIRUS): ICD-10-CM

## 2021-05-25 DIAGNOSIS — D89.89 SUPPRESSION OF IMMUNE SYSTEM SUBTHERAPEUTIC (H): ICD-10-CM

## 2021-05-25 PROBLEM — Z98.890 HISTORY OF OPEN SIGMOIDECTOMY: Status: ACTIVE | Noted: 2018-12-12

## 2021-05-25 PROBLEM — Z90.49 HISTORY OF OPEN SIGMOIDECTOMY: Status: ACTIVE | Noted: 2018-12-12

## 2021-05-25 PROCEDURE — 96127 BRIEF EMOTIONAL/BEHAV ASSMT: CPT | Performed by: FAMILY MEDICINE

## 2021-05-25 PROCEDURE — 99214 OFFICE O/P EST MOD 30 MIN: CPT | Performed by: FAMILY MEDICINE

## 2021-05-25 RX ORDER — SERTRALINE HYDROCHLORIDE 25 MG/1
25 TABLET, FILM COATED ORAL DAILY
Qty: 30 TABLET | Refills: 1 | Status: SHIPPED | OUTPATIENT
Start: 2021-05-25 | End: 2021-07-28

## 2021-05-25 RX ORDER — SELENIUM 50 MCG
1 TABLET ORAL
COMMUNITY
End: 2022-01-11

## 2021-05-25 ASSESSMENT — PATIENT HEALTH QUESTIONNAIRE - PHQ9
SUM OF ALL RESPONSES TO PHQ QUESTIONS 1-9: 12
5. POOR APPETITE OR OVEREATING: SEVERAL DAYS

## 2021-05-25 ASSESSMENT — ANXIETY QUESTIONNAIRES
3. WORRYING TOO MUCH ABOUT DIFFERENT THINGS: SEVERAL DAYS
2. NOT BEING ABLE TO STOP OR CONTROL WORRYING: SEVERAL DAYS
GAD7 TOTAL SCORE: 5
1. FEELING NERVOUS, ANXIOUS, OR ON EDGE: SEVERAL DAYS
6. BECOMING EASILY ANNOYED OR IRRITABLE: SEVERAL DAYS
IF YOU CHECKED OFF ANY PROBLEMS ON THIS QUESTIONNAIRE, HOW DIFFICULT HAVE THESE PROBLEMS MADE IT FOR YOU TO DO YOUR WORK, TAKE CARE OF THINGS AT HOME, OR GET ALONG WITH OTHER PEOPLE: SOMEWHAT DIFFICULT
5. BEING SO RESTLESS THAT IT IS HARD TO SIT STILL: NOT AT ALL
7. FEELING AFRAID AS IF SOMETHING AWFUL MIGHT HAPPEN: NOT AT ALL

## 2021-05-25 NOTE — PATIENT INSTRUCTIONS
Please schedule a lab appointment at [facility], thank you!    Address: [address], [place] [address]  Phone: [phone]      You can contact our clinic anytime to schedule an appointment with Sarabjit Vinson, our behavioralist here at [facility].     If you feel that you're having a severe mental health crisis, or feel unable to cope with symptoms of depression or anxiety, you can go to the EMpath unit at [facility], an acute evaluation and management center at the [facility].

## 2021-05-25 NOTE — PROGRESS NOTES
Alex is a 41 year old who is being evaluated via a billable video visit.      How would you like to obtain your AVS? MyChart  If the video visit is dropped, the invitation should be resent by: Text to cell phone: [phone] (M)   Will anyone else be joining your video visit? 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, now requiring GH treatment, thyroid, testosterone, and dexamethasone  Managed by endocrinologist, but with concern about ability to generate antibodies due to chronic dexamethasone use  Will check for covid antibodies, he was vaccinated with Pfizer vaccine 3/18/21 and 4/10/21, so should have reached peak antibodies around 4/24/21    Moderate episode of recurrent major depressive disorder (H)  Severe, acute worsening, possibly due to medical conditions above, but given severity do recommend starting  - DEPRESSION ACTION PLAN (DAP)  - sertraline (ZOLOFT) 25 MG tablet; Take 1 tablet (25 mg) by mouth daily  - Basic metabolic panel; Future  - **CBC with platelets FUTURE anytime; Future    CARDIOVASCULAR SCREENING; LDL GOAL LESS THAN 160  - Lipid panel reflex to direct LDL Fasting; Future    Suppression of immune system subtherapeutic (H)  See above  Will fu as indicated.   - COVID-19 Bruce RBD Jes & Titer Reflex; Future    Screening for HIV (human immunodeficiency virus)  - **HIV Antigen Antibody Combo FUTURE anytime; Future    Encounter for hepatitis C screening test for low risk patient  - **Hepatitis C Screen Reflex to RNA FUTURE anytime; Future    Ordering of each unique test             Return in about 30 days (around 6/24/2021) for follow up, preventive visit (wellness/annual physical exam).    Samra Hidalgo MD  [facility]    Subjective   Alex is a 41 year old who presents for the following health issues     HPI    Panhypopi  I take Dexamethasone daily for Acromegaly.  I want to know if I can have an antibody test to find out if my Covid vaccine created any antibodies.  I am aware that we don't yet know what level of antibodies are needed.  I am trying to make decisions about childcare etc. and I want help assessing my risk level.    TSH   Date Value Ref Range Status   05/14/2021 0.07 (L) 0.40 - 4.00 mU/L Final   02/19/2020 0.03 (L) 0.40 - 4.00 mU/L Final   10/02/2018 0.01 (L) 0.40 - 4.00 mU/L Final   ]  T4 Free   Date Value Ref Range Status   05/14/2021 1.10 0.76 - 1.46 ng/dL Final       Depression Followup    How are you doing with your depression since your last visit? Worsened over this past year    Are you having other symptoms that might be associated with depression? Yes:  low energy    Have you had a significant life event?  OTHER: his birthday is the anniversary of Jaya Durbin's death     Are you feeling anxious or having panic attacks?   No    Do you have any concerns with your use of alcohol or other drugs? No     He reports significant stress at work, works really long hours, stays up late, getting more sleep deprived    Work stress not likely to improve soon, works as an         Social History     Tobacco Use     Smoking status: Never Smoker     Smokeless tobacco: Never Used   Substance Use Topics     Alcohol use: Yes     Comment: Only so often     Drug use: No     PHQ 7/12/2018 5/25/2021   PHQ-9 Total Score 4 12   Q9: Thoughts of better off dead/self-harm past 2 weeks Not at all Not at all     MORA-7 SCORE 5/25/2021   Total Score 5     MORA-7  5/25/2021   1. Feeling nervous, anxious, or on edge 1   2. Not being able to stop or control worrying 1   3. Worrying too much about different things 1   4. Trouble relaxing 1   5. Being so restless that it is hard to sit still 0   6. Becoming easily annoyed or irritable 1   7. Feeling afraid, as if something awful might happen 0   MORA-7 Total Score 5   If you  checked any problems, how difficult have they made it for you to do your work, take care of things at home, or get along with other people? Somewhat difficult       Suicide Assessment Five-step Evaluation and Treatment (SAFE-T)    Hypothyroidism Follow-up      Since last visit, patient describes the following symptoms: Weight stable, no hair loss, no skin changes, no constipation, no loose stools  Review of Systems   Constitutional, HEENT, cardiovascular, pulmonary, GI, , musculoskeletal, neuro, skin, endocrine and psych systems are negative, except as otherwise noted.      Objective           Vitals:  No vitals were obtained today due to virtual visit.    Physical Exam   GENERAL: Healthy, alert and no distress  EYES: Eyes grossly normal to inspection.  No discharge or erythema, or obvious scleral/conjunctival abnormalities.  RESP: No audible wheeze, cough, or visible cyanosis.  No visible retractions or increased work of breathing.    SKIN: Visible skin clear. No significant rash, abnormal pigmentation or lesions.  NEURO: Cranial nerves grossly intact.  Mentation and speech appropriate for age.  PSYCH: mentation appears normal, tearful, anxious, judgement and insight intact and appearance well groomed                Video-Visit Details    Type of service:  Video Visit    Video End Time:2:19 PM    Originating Location (pt. Location): Home    Distant Location (provider location):  [facility]     Platform used for Video Visit: OptionEase

## 2021-05-26 ENCOUNTER — TELEPHONE (OUTPATIENT)
Dept: ENDOCRINOLOGY | Facility: CLINIC | Age: 41
End: 2021-05-26

## 2021-05-26 DIAGNOSIS — E23.0 GROWTH HORMONE DEFICIENCY (H): Primary | ICD-10-CM

## 2021-05-26 DIAGNOSIS — E23.0 HYPOPITUITARISM (H): ICD-10-CM

## 2021-05-26 ASSESSMENT — ANXIETY QUESTIONNAIRES: GAD7 TOTAL SCORE: 5

## 2021-05-26 NOTE — TELEPHONE ENCOUNTER
PA Initiation    Medication: Nutropin AQ Nuspin 5 mg - Pending  Insurance Company: [employer] ([facility]) - Phone [phone] Fax [phone]  Pharmacy Filling the Rx: [facility] - [place], [place] - [address]  Filling Pharmacy Phone: [phone]  Filling Pharmacy Fax: [phone]  Start Date: 5/26/2021

## 2021-05-27 DIAGNOSIS — Z11.59 ENCOUNTER FOR HEPATITIS C SCREENING TEST FOR LOW RISK PATIENT: ICD-10-CM

## 2021-05-27 DIAGNOSIS — Z13.6 CARDIOVASCULAR SCREENING; LDL GOAL LESS THAN 160: ICD-10-CM

## 2021-05-27 DIAGNOSIS — Z11.4 SCREENING FOR HIV (HUMAN IMMUNODEFICIENCY VIRUS): ICD-10-CM

## 2021-05-27 DIAGNOSIS — D89.89 SUPPRESSION OF IMMUNE SYSTEM SUBTHERAPEUTIC (H): ICD-10-CM

## 2021-05-27 DIAGNOSIS — F33.1 MODERATE EPISODE OF RECURRENT MAJOR DEPRESSIVE DISORDER (H): ICD-10-CM

## 2021-05-27 LAB
ANION GAP SERPL CALCULATED.3IONS-SCNC: 6 MMOL/L (ref 3–14)
BUN SERPL-MCNC: 10 MG/DL (ref 7–30)
CALCIUM SERPL-MCNC: 8.8 MG/DL (ref 8.5–10.1)
CHLORIDE SERPL-SCNC: 104 MMOL/L (ref 94–109)
CHOLEST SERPL-MCNC: 290 MG/DL
CO2 SERPL-SCNC: 28 MMOL/L (ref 20–32)
CREAT SERPL-MCNC: 0.87 MG/DL (ref 0.66–1.25)
ERYTHROCYTE [DISTWIDTH] IN BLOOD BY AUTOMATED COUNT: 13.4 % (ref 10–15)
GFR SERPL CREATININE-BSD FRML MDRD: >90 ML/MIN/{1.73_M2}
GLUCOSE SERPL-MCNC: 73 MG/DL (ref 70–99)
HCT VFR BLD AUTO: 40.7 % (ref 40–53)
HCV AB SERPL QL IA: NONREACTIVE
HDLC SERPL-MCNC: 78 MG/DL
HGB BLD-MCNC: 14.1 G/DL (ref 13.3–17.7)
HIV 1+2 AB+HIV1 P24 AG SERPL QL IA: NONREACTIVE
LDLC SERPL CALC-MCNC: 195 MG/DL
MCH RBC QN AUTO: 29 PG (ref 26.5–33)
MCHC RBC AUTO-ENTMCNC: 34.6 G/DL (ref 31.5–36.5)
MCV RBC AUTO: 84 FL (ref 78–100)
NONHDLC SERPL-MCNC: 212 MG/DL
PLATELET # BLD AUTO: 187 10E9/L (ref 150–450)
POTASSIUM SERPL-SCNC: 3.6 MMOL/L (ref 3.4–5.3)
RBC # BLD AUTO: 4.87 10E12/L (ref 4.4–5.9)
SODIUM SERPL-SCNC: 138 MMOL/L (ref 133–144)
TRIGL SERPL-MCNC: 86 MG/DL
WBC # BLD AUTO: 7.3 10E9/L (ref 4–11)

## 2021-05-27 PROCEDURE — 80048 BASIC METABOLIC PNL TOTAL CA: CPT | Performed by: FAMILY MEDICINE

## 2021-05-27 PROCEDURE — 36415 COLL VENOUS BLD VENIPUNCTURE: CPT | Performed by: FAMILY MEDICINE

## 2021-05-27 PROCEDURE — 85027 COMPLETE CBC AUTOMATED: CPT | Performed by: FAMILY MEDICINE

## 2021-05-27 PROCEDURE — 86803 HEPATITIS C AB TEST: CPT | Performed by: FAMILY MEDICINE

## 2021-05-27 PROCEDURE — 86769 SARS-COV-2 COVID-19 ANTIBODY: CPT | Performed by: FAMILY MEDICINE

## 2021-05-27 PROCEDURE — 80061 LIPID PANEL: CPT | Performed by: FAMILY MEDICINE

## 2021-05-27 PROCEDURE — 87389 HIV-1 AG W/HIV-1&-2 AB AG IA: CPT | Performed by: FAMILY MEDICINE

## 2021-05-27 RX ORDER — SOMATROPIN 5 MG/2ML
0.5 INJECTION, SOLUTION SUBCUTANEOUS DAILY
Qty: 6 ML | Refills: 5 | Status: SHIPPED | OUTPATIENT
Start: 2021-05-27 | End: 2022-01-18

## 2021-05-27 NOTE — TELEPHONE ENCOUNTER
Due to insurance change, patient will have to switch to Nutropin for growth hormone.    Please send new Rx for Nutropin AQ Nuspin 5 mg/2 mL - 0.5 mg once daily - Qty: 6 mL (#3 pens) per 30 days to [facility].    Sending My Chart to patient to update him with above information and go over any questions he may have.

## 2021-05-27 NOTE — TELEPHONE ENCOUNTER
Prior Authorization Approval    Authorization Effective Date: 5/26/2021  Authorization Expiration Date: 5/26/2022  Medication: Nutropin AQ Nuspin 5 mg - Approved  Approved Dose/Quantity: 6 mL per 30 days  Reference #: [id number]   Insurance Company: Jeff ([facility]) - Phone [phone] Fax [phone]  Expected CoPay: $200.00     CoPay Card Available: Yes    Foundation Assistance Needed: Nutropin [facility]  Which Pharmacy is filling the prescription (Not needed for infusion/clinic administered): [facility] - [place], [place] - [address]  Pharmacy Notified: Yes  Patient Notified: Yes         Detail Level: Detailed Quality 130: Documentation Of Current Medications In The Medical Record: Current Medications Documented

## 2021-05-28 LAB
SARS-COV-2 AB PNL SERPL IA: POSITIVE
SARS-COV-2 IGG SERPL IA-ACNC: NORMAL

## 2021-06-03 NOTE — RESULT ENCOUNTER NOTE
HI, great news, as you noticed, you do have covid antibodies!  Hurray!    Your hiv test was negative for infection, you do NOT have this disease.     Your screening test was negative for Hepatitis C, which is great news! You have NOT been infected with this liver disease.  You do not need any further testing for Hepatitis C.     Your cbc, or complete blood count, which measures red blood cells (to check for anemia and other vitamin deficiencies) and white blood cells (to check for infection and leukemia) is normal, which is great!  Your platelets, which reflect liver function and ability to clot, are normal as well.     Your glucose level, electrolyte level and kidney function, measured with a test called the basic metabolic panel, is normal, which is great news!      Your cholesterol is a little abnormal. Your HDL is excellent; the higher the better; and your triglycerides are very low, which means you must not eat a lot of carbohydrates. However, your total cholesterol and LDL cholesterol are elevated.    Desired or goal levels are:  CHOLESTEROL: Desirable is less than 200.  HDL (Good Cholesterol): Desirable is greater than 40 in men and greater than 50 in women.  LDL (Bad Cholesterol): Desirable is less than 160.  TRIGLYCERIDES: Desirable is less than 150.    As you may know, abnormal cholesterol is one factor that increases your risk for heart disease and stroke. You can improve your cholesterol by controlling the amount and type of fat you eat and by increasing your daily activity level.    Here are some ways to improve your nutrition (adapted from the American Academy of Family Practice handout):  Eat less fat (especially butter, Crisco and other saturated fats)  Buy lean cuts of meat; reduce your portions of red meat or substitute poultry or fish, or avoid meat altogether.  Use skim milk and low-fat dairy products  Eat no more than 4 egg yolks per week  Avoid fried or fast foods that are high in fat  Eat more  fruits and vegetables    If you have any questions, please contact the clinic or schedule an appointment with me, thank you!    Sincerely,  Dr. Samra Hidalgo MD  6/3/2021

## 2021-06-08 DIAGNOSIS — E29.1 HYPOGONADISM MALE: ICD-10-CM

## 2021-06-08 DIAGNOSIS — E23.0 HYPOPITUITARISM (H): ICD-10-CM

## 2021-06-09 DIAGNOSIS — E29.1 HYPOGONADISM MALE: ICD-10-CM

## 2021-06-09 DIAGNOSIS — E23.0 HYPOPITUITARISM (H): ICD-10-CM

## 2021-06-09 RX ORDER — DEXAMETHASONE 1 MG
TABLET ORAL
Qty: 90 TABLET | Refills: 2 | Status: SHIPPED | OUTPATIENT
Start: 2021-06-09

## 2021-06-09 NOTE — TELEPHONE ENCOUNTER
"levothyroxine (SYNTHROID/LEVOTHROID) 125 MCG tablet   Last Written Prescription Date:  6/15/2020  Last Fill Quantity: 90,   # refills: 3  Last Office Visit : 3/10/2021  Future Office visit:  None  Routing refill request to provider for review/approval because:  Abnormal TSH     Refer to Provider for review  Recent Labs   Lab Test 05/14/21  0907   TSH 0.07*         dexamethasone (DECADRON) 1 MG tablet  Last Written Prescription Date:  5/29/2020  Last Fill Quantity: 90,   # refills: 2  Last Office Visit : 3/10/2021  Future Office visit:  None  Routing refill request to provider for review/approval because:  Not on refill protocol        Refer to Provider for review       syringe/needle, disp, 23G X 1\" 3 ML MISC  Last Written Prescription Date:  12/31/2020  Last Fill Quantity: 13,   # refills: 0  Last Office Visit : 3/10/2021  Future Office visit:  None  Routing refill request to provider for review/approval because:  Refer to Provider for review    Carmina Garcia RN  Central Triage Red Flags/Med Refills      "

## 2021-06-11 ENCOUNTER — NURSE TRIAGE (OUTPATIENT)
Dept: FAMILY MEDICINE | Facility: CLINIC | Age: 41
End: 2021-06-11

## 2021-06-11 NOTE — TELEPHONE ENCOUNTER
"Patient reporting feeling \"heavy\", fatigued and some intermittent moments of vertigo since starting Sertraline 5/25/21    Per micromedex-common s/e include:    Other: Fatigue (12% )  Neurologic: Dizziness (6% to 17% )    Huddled with UC provider who states patient should start to feel better by week 4-5--patient encouraged to continue taking medication.     Patient will follow-up if feeling worse     Patient in agreement with plan and verbalizes understanding.   Thanks!   Mireya Leija RN    "

## 2021-06-14 RX ORDER — LEVOTHYROXINE SODIUM 125 UG/1
125 TABLET ORAL DAILY
Qty: 90 TABLET | Refills: 3 | Status: SHIPPED | OUTPATIENT
Start: 2021-06-14 | End: 2022-06-29

## 2021-06-14 RX ORDER — DEXAMETHASONE 1 MG
TABLET ORAL
Qty: 105 TABLET | Refills: 3 | Status: SHIPPED | OUTPATIENT
Start: 2021-06-14 | End: 2022-06-29

## 2021-06-15 ENCOUNTER — TELEPHONE (OUTPATIENT)
Dept: ENDOCRINOLOGY | Facility: CLINIC | Age: 41
End: 2021-06-15

## 2021-06-15 NOTE — TELEPHONE ENCOUNTER
Dexamethasone filled in place of hydrcortisone pepr Dr Terrell Estrada, RN on 6/15/2021 at 8:25 AM

## 2021-06-15 NOTE — TELEPHONE ENCOUNTER
----- Message from Damaris Tejada MD sent at 6/14/2021  7:55 PM CDT -----  Regarding: RE: dexamethasone refill  Sure dexamethaosne fine  ----- Message -----  From: Karma Estrada RN  Sent: 6/14/2021   7:46 AM CDT  To: Damaris Tejada MD  Subject: dexamethasone refill                             Per your last visit note on Dexamethasone :  # Secondary adrenal insufficiency  Since patient feels well , he prefers to stay on stay Dexamethasone 0.5 mg daily, rather than switching to hydrocortisone. Karma CHAN

## 2021-07-13 DIAGNOSIS — E23.0 GROWTH HORMONE DEFICIENCY (H): Primary | ICD-10-CM

## 2021-07-13 DIAGNOSIS — E23.0 HYPOPITUITARISM (H): ICD-10-CM

## 2021-07-13 NOTE — TELEPHONE ENCOUNTER
3/10/2021  [facility] [place]   Damaris Tejada MD  Endocrinology, Diabetes, and Metabolism    Pen tips

## 2021-07-25 DIAGNOSIS — F33.1 MODERATE EPISODE OF RECURRENT MAJOR DEPRESSIVE DISORDER (H): ICD-10-CM

## 2021-07-28 RX ORDER — SERTRALINE HYDROCHLORIDE 25 MG/1
TABLET, FILM COATED ORAL
Qty: 30 TABLET | Refills: 0 | Status: SHIPPED | OUTPATIENT
Start: 2021-07-28 | End: 2021-08-24

## 2021-08-03 ENCOUNTER — MYC MEDICAL ADVICE (OUTPATIENT)
Dept: FAMILY MEDICINE | Facility: CLINIC | Age: 41
End: 2021-08-03

## 2021-08-03 ENCOUNTER — VIRTUAL VISIT (OUTPATIENT)
Dept: FAMILY MEDICINE | Facility: CLINIC | Age: 41
End: 2021-08-03
Payer: COMMERCIAL

## 2021-08-03 DIAGNOSIS — E23.0 HYPOPITUITARISM (H): ICD-10-CM

## 2021-08-03 DIAGNOSIS — E29.1 HYPOGONADISM MALE: Primary | ICD-10-CM

## 2021-08-03 DIAGNOSIS — E22.0 ACROMEGALY (H): ICD-10-CM

## 2021-08-03 DIAGNOSIS — E23.0 GROWTH HORMONE DEFICIENCY (H): ICD-10-CM

## 2021-08-03 PROCEDURE — 99213 OFFICE O/P EST LOW 20 MIN: CPT | Mod: GT | Performed by: FAMILY MEDICINE

## 2021-08-03 NOTE — PROGRESS NOTES
Alex is a 41 year old who is being evaluated via a billable video visit.      How would you like to obtain your AVS? MyChart  If the video visit is dropped, the invitation should be resent by: Text to cell phone: [phone]  Will anyone else be joining your video visit? No      Video Start Time: 11:49 AM    Assessment & Plan     Hypogonadism male  with  Hypopituitarism (H)  Acromegaly (H)  Growth hormone deficiency (H)   on chronic steroids. He has been vaccinated and did have antibodies as of 5/27/21, although of course it has not been proven if this guarantees protection from severe covid infection. His wife works in a school in close contact with young children and with coworkers who have not been vaccinated and who do not wear masks reliably. She is quite concerned about candy covid and infecting Alex.    I agree that this is a risk and recommend a position where she can stay safely socially distanced and masked, or work in a workplace where 100% of colleagues are vaccinated, or work from home if this is not possible. I will complete FMLA paperwork as needed.    No follow-ups on file.    Samra Hidalgo MD  [facility]    Subjective   Alex is a 41 year old who presents for the following health issues  accompanied by his spouse:    HPI     FMLA forms completed. Will scan form via my chart.        Review of Systems   Constitutional, HEENT, cardiovascular, pulmonary, GI, , musculoskeletal, neuro, skin, endocrine and psych systems are negative, except as otherwise noted.      Objective           Vitals:  No vitals were obtained today due to virtual visit.    Physical Exam   GENERAL: Healthy, alert and no distress  EYES: Eyes grossly normal to inspection.  No discharge or erythema, or obvious scleral/conjunctival abnormalities.  RESP: No audible wheeze, cough, or visible cyanosis.  No visible retractions or increased work of breathing.    SKIN: Visible skin clear. No  significant rash, abnormal pigmentation or lesions.  NEURO: Cranial nerves grossly intact.  Mentation and speech appropriate for age.  PSYCH: Mentation appears normal, affect normal/bright, judgement and insight intact, normal speech and appearance well-groomed.    I reviewed covid testing and covid vaccination status today.            Video-Visit Details    Type of service:  Video Visit    Video End Time:12:26 PM    Originating Location (pt. Location): Home    Distant Location (provider location):  [facility]     Platform used for Video Visit: [employer]

## 2021-08-03 NOTE — LETTER
[facility]  [address] [place] [address]  [phone]        August 3, 2021      Ivan D Welander  [address]  [facility] [address]      Dear Alex,    As your health care provider, I am concerned that your underlying medical condition puts you at particularly high risk for serious complications should you contract a COVID-19 infection. In addition, your ability to develop an immune response that is adequately protective even after vaccination is unknown.     Specifically, you have the following conditions/diagnoses, included as high risk conditions per the Centers for Disease Control and Prevention at CDC.gov.     Hypopituitarism on chronic steroids  Immune response to infection therefore potentially unreliable.    COVID-19 is a new disease and there is limited information regarding risk factors for severe disease. Based on currently available information and clinical expertise, older adults and people of any age who have serious underlying medical conditions might be at higher risk for severe illness from COVID-19.     It is my medical opinion that you should avoid close contact with others and work from home if possible during this COVID-19 pandemic to avoid covid-19 infection.     Sincerely,    Samra Hidalgo MD    [facility]

## 2021-08-09 ENCOUNTER — MYC MEDICAL ADVICE (OUTPATIENT)
Dept: FAMILY MEDICINE | Facility: CLINIC | Age: 41
End: 2021-08-09

## 2021-08-12 NOTE — TELEPHONE ENCOUNTER
FMLA forms have been emailed  Copy sent to abstracting  Copy in provider faxed folder  Original mailed to patient

## 2021-08-12 NOTE — TELEPHONE ENCOUNTER
Completed, signed forms placed in MA basket today.  Sincerely,  Dr. Samra Hidalgo MD  8/12/2021    8:59 AM

## 2021-08-17 ENCOUNTER — E-VISIT (OUTPATIENT)
Dept: FAMILY MEDICINE | Facility: CLINIC | Age: 41
End: 2021-08-17
Payer: COMMERCIAL

## 2021-08-17 DIAGNOSIS — N45.1 EPIDIDYMITIS: Primary | ICD-10-CM

## 2021-08-17 PROCEDURE — 99421 OL DIG E/M SVC 5-10 MIN: CPT | Performed by: FAMILY MEDICINE

## 2021-08-19 RX ORDER — LEVOFLOXACIN 500 MG/1
500 TABLET, FILM COATED ORAL DAILY
Qty: 10 TABLET | Refills: 0 | Status: SHIPPED | OUTPATIENT
Start: 2021-08-19 | End: 2021-08-29

## 2021-08-19 NOTE — PATIENT INSTRUCTIONS
Thank you for choosing us for your care. I have placed an order for a prescription so that you can start treatment. View your full visit summary for details by clicking on the link below. Your pharmacist will able to address any questions you may have about the medication.     If you're not feeling better within 5-7 days, please schedule an appointment.  You can schedule an appointment right here in [facility], or call [phone]  If the visit is for the same symptoms as your eVisit, we'll refund the cost of your eVisit if seen within seven days.

## 2021-09-14 DIAGNOSIS — E23.0 GROWTH HORMONE DEFICIENCY (H): ICD-10-CM

## 2021-09-16 RX ORDER — SOMATROPIN 5 MG/1.5ML
0.5 INJECTION, SOLUTION SUBCUTANEOUS DAILY
Qty: 4.5 ML | Refills: 5 | Status: SHIPPED | OUTPATIENT
Start: 2021-09-16

## 2021-09-16 NOTE — TELEPHONE ENCOUNTER
Somatropin (NORDITROPIN FLEXPRO) 5 MG/1.5ML SOPN      Inject 0.5 mg Subcutaneous daily   Last Written Prescription Date:  2-2-21  Last Fill Quantity: 4.5 ml,   # refills: 5  Last Office Visit : 3-10-21  Future Office visit:  None    Spoke with pharm, pt requesting to change back to above- insurance will now cover this.  See 5-26-21 tele note    Also on med list: Somatropin (NUTROPIN AQ NUSPIN 5) 5 MG/2ML SOPN  Inject 0.5 mg Subcutaneous daily  6 mL 5 5/27/2021       Routing refill request to provider for review/approval because:  Drug not on the [facility], [facility] or [facility] refill protocol or controlled substance  Pt requesting to change back to requested med.

## 2021-09-19 ENCOUNTER — HEALTH MAINTENANCE LETTER (OUTPATIENT)
Age: 41
End: 2021-09-19

## 2021-12-21 DIAGNOSIS — E23.0 HYPOPITUITARISM (H): ICD-10-CM

## 2021-12-21 DIAGNOSIS — F33.1 MODERATE EPISODE OF RECURRENT MAJOR DEPRESSIVE DISORDER (H): ICD-10-CM

## 2021-12-22 RX ORDER — SERTRALINE HYDROCHLORIDE 25 MG/1
TABLET, FILM COATED ORAL
Qty: 90 TABLET | Refills: 0 | Status: SHIPPED | OUTPATIENT
Start: 2021-12-22 | End: 2022-06-28

## 2021-12-22 RX ORDER — TESTOSTERONE CYPIONATE 200 MG/ML
100 INJECTION, SOLUTION INTRAMUSCULAR WEEKLY
Qty: 6 ML | Refills: 5 | Status: SHIPPED | OUTPATIENT
Start: 2021-12-22 | End: 2022-06-29

## 2021-12-22 NOTE — TELEPHONE ENCOUNTER
TESTOSTERONE CYP 200MG/ML SDV 1ML  Last Written Prescription Date: 3/8/2021  Last Fill Quantity: 6,   # refills: 1  Last Office Visit : 3/10/2021  Future Office visit:  None    Routing refill request to provider for review/approval because:  Drug not on the [facility], [facility] or [facility] refill protocol or controlled substance      Carmina Garcia RN  Central Triage Red Flags/Med Refills

## 2022-01-09 ENCOUNTER — HEALTH MAINTENANCE LETTER (OUTPATIENT)
Age: 42
End: 2022-01-09

## 2022-01-11 ENCOUNTER — VIRTUAL VISIT (OUTPATIENT)
Dept: GASTROENTEROLOGY | Facility: CLINIC | Age: 42
End: 2022-01-11
Attending: INTERNAL MEDICINE
Payer: COMMERCIAL

## 2022-01-11 DIAGNOSIS — C18.7 MALIGNANT NEOPLASM OF SIGMOID COLON (H): Primary | ICD-10-CM

## 2022-01-11 DIAGNOSIS — E22.0 ACROMEGALY (H): ICD-10-CM

## 2022-01-11 DIAGNOSIS — Z90.49 HISTORY OF OPEN SIGMOIDECTOMY: ICD-10-CM

## 2022-01-11 DIAGNOSIS — Z86.0100 HISTORY OF COLONIC POLYPS: ICD-10-CM

## 2022-01-11 DIAGNOSIS — Z98.890 HISTORY OF OPEN SIGMOIDECTOMY: ICD-10-CM

## 2022-01-11 DIAGNOSIS — D35.2 PITUITARY ADENOMA (H): ICD-10-CM

## 2022-01-11 PROCEDURE — 99205 OFFICE O/P NEW HI 60 MIN: CPT | Mod: GT | Performed by: INTERNAL MEDICINE

## 2022-01-11 NOTE — PATIENT INSTRUCTIONS
It was a pleasure taking care of you today.  I've included a brief summary of our discussion and care plan from today's visit below.  Please review this information with your primary care provider.  _______________________________________________________________________    My recommendations are summarized as follows:    1. Please schedule your colonoscopy at your convenience. It is okay to wait for the cases of COVID to diminish given the high volume of cases at present  2. On my literature search, I did not see anything to support upper endoscopy or that you are at a higher risk of a gastric malignancy. I have sent a message to your endocrinologist as well in case there are data that I did not come across. For now we will plan on only a colonoscopy unless more information is brought to my attention.    To schedule endoscopic procedures you may call: [phone]  To schedule radiology tests you may call: [phone]  To schedule an ENT appointment you may call: [phone]    Please call my nurse January ([phone]), Lakhwinder ([phone]) with any questions or concerns.  If you were seen through the [facility] please feel free to reach out to Juliet at [phone]   --    Return to GI Clinic as needed   _______________________________________________________________________    Who do I call with any questions after my visit?  Please be in touch if there are any further questions that arise following today's visit.  There are multiple ways to contact your gastroenterology care team.        During business hours, you may reach a Gastroenterology nurse at [phone] and choose option 3.         To schedule or reschedule an appointment, please call [phone].       You can always send a secure message through boarding pass.  boarding pass messages are answered by your nurse or doctor typically within 24 hours.  Please allow extra time on weekends and holidays.        For urgent/emergent questions after business  hours, you may reach the on-call GI Fellow by contacting the [facility]  at [phone].     How will I get the results of any tests ordered?    You will receive all of your results.  If you have signed up for [employer], any tests ordered at your visit will be available to you after your physician reviews them.  Typically this takes 1-2 weeks.  If there are urgent results that require a change in your care plan, your physician or nurse will call you to discuss the next steps.      What is [employer]?  Dat[employer] is a secure way for you to access all of your healthcare records from the [facility].  It is a web based computer program, so you can sign on to it from any location.  It also allows you to send secure messages to your care team.  I recommend signing up for [employer] access if you have not already done so and are comfortable with using a computer.      How to I schedule a follow-up visit?  If you did not schedule a follow-up visit today, please call [phone] to schedule a follow-up office visit.        Sincerely,    Ovi Lepe DO   of Medicine  Program Head, Esophageal Disorders Program  Division of Gastroenterology, Hepatology, and Nutrition  [facility]

## 2022-01-11 NOTE — PROGRESS NOTES
Alex is a 41 year old who is being evaluated via a billable video visit.      How would you like to obtain your AVS? MyChart  If the video visit is dropped, the invitation should be resent by: Send to e-mail at: [email or web address]  Will anyone else be joining your video visit? No      Video Start Time: 10:20 AM  Video-Visit Details    Type of service:  Video Visit    Video End Time:10:36 AM    Originating Location (pt. Location): Home    Distant Location (provider location):  [facility] [place]     Platform used for Video Visit: [facility]     Gastroenterology Visit for: Ivan D Welander 1980   MRN: [id number]     Reason for Visit:  chief complaint    Referred by: Terrell  / [address] [place] [address] / [facility] [id number]  Patient Care Team:  Sabine Stinson MD as PCP - General (Family Practice)  Damaris Tejada MD as Assigned Endocrinology Provider  Samra Hidalgo MD as Assigned PCP  Ovi Lepe DO as MD (Gastroenterology)    History of Present Illness:   Ivan D Welander is a 41 year old male with acromegaly sp resection and gamma knife, left sided colon cancer sp resection who is presenting as a new patient in consultation at the request of Dr. Tejada with a chief complaint of need for colonoscopy with history of acromegaly and colon cancer.  ---------------------------------------------------------------  Ivan D Welander states in 2009 he was diagnosed with acromegaly. He underwent transphenoidal surgery, then gamma knife. The patient underwent colonoscopy in 2010 and found two polyps with cancer and underwent sigmoid resection. Polyps had cancer with local lymphatic invasion. No amelia invasion. Had a repeat pituitary surgery in 2016/2017.     No fecal incontinence but believes surgery involved a portion of the anus.     The patient does state varied stools. He reports less fully formed stools on occasion.     Otherwise negative review of  systems  ---------------------------------------------------------------     Ivan D Welander denies dysphagia, odynophagia, heartburn/reflux, nausea, vomiting, early satiety, abdominal pain, abdominal distension/bloating, diarrhea, constipation, hematochezia, or melena. No unintentional weight loss.     Family history of colon cancer: ?grandmother (in her late 70s and 80s)  Wt Readings from Last 5 Encounters:   03/11/20 87.7 kg (193 lb 4.8 oz)   03/06/19 85.6 kg (188 lb 12.8 oz)   10/02/18 84 kg (185 lb 3.2 oz)   07/12/18 79.9 kg (176 lb 3.2 oz)        Esophageal Questionnaire(s)    BEDQ Questionnaire  No flowsheet data found.  No flowsheet data found.    Eckardt Questionnaire  No flowsheet data found.    Promis 10 Questionnaire  No flowsheet data found.        STUDIES & PROCEDURES:    EGD:   Date:  Impression:  Pathology Report:    Colonoscopy:  Date: 4/17/17  Impression:  1.  Small non-bleeding, internal and non-inflamed, internal  hemorrhoids  2.  Mild diverticulosis was noted  3.  A surgical change were found in the rectum  4.  Normal mucosa was found in rectum seen upon the retroflexed view, in the  descending colon, at the splenic flexure, in the transverse colon, at the  hepatic flexure, in the ascending colon, at the cecum, ileocecal valve, and  throughout the entire examined colon  5.  Retroflexion was not performed    RECOMMENDATIONS:     fiber rich diet  hemoccult stool examinations yearly  yearly rectal examination  REPEAT EXAM:     1.  Repeat exam in 3 years.  2.  Colon cancer, acromegaly and history of colonic polyps.  Pathology Report:     EndoFLIP directed at the UES or LES (8cm (EF-325) balloon length or 16cm (EF-322) balloon length):   Date:  8cm balloon  Balloon inflation Balloon pressure CSA (mm^2) DI (mm^2/mmHg) Dmin (mm) Compliance   20 (ladmark ID)        30        40        50           16cm balloon  Balloon inflation Balloon pressure CSA (mm^2) DI (mm^2/mmHg) Dmin (mm) Compliance   30  (deepti ID)        40        50        60        70           High Resolution Manometry:  Date:  Impression:    PH/Impedance:  Date:  Impression:     Bravo:  48 or 96hr  Date:  Impression:    CT:  Date:  Impression:    Esophagram:  Date:  Impression:     Prior medical records were reviewed including, but not limited to, notes from referring providers, lab work, radiographic tests, and other diagnostic tests. Pertinent results were summarized above.     History     Past Medical History:   Diagnosis Date     Asymptomatic LV dysfunction 8/30/2012     Cancer (H)     Colon     Thyroid disease     Acromegaly       Past Surgical History:   Procedure Laterality Date     APPENDECTOMY       COLONOSCOPY       GI SURGERY      sigmoid colectomy     HEAD & NECK SURGERY  2010    transphenoidal hypophysectomy, repeat 2016       Social History     Socioeconomic History     Marital status:      Spouse name: Not on file     Number of children: Not on file     Years of education: Not on file     Highest education level: Not on file   Occupational History     Not on file   Tobacco Use     Smoking status: Never Smoker     Smokeless tobacco: Never Used   Substance and Sexual Activity     Alcohol use: Yes     Comment: Only so often     Drug use: No     Sexual activity: Yes     Partners: Female     Birth control/protection: None   Other Topics Concern     Not on file   Social History Narrative     Not on file     Social Determinants of Health     Financial Resource Strain: Not on file   Food Insecurity: Not on file   Transportation Needs: Not on file   Physical Activity: Not on file   Stress: Not on file   Social Connections: Not on file   Intimate Partner Violence: Not on file   Housing Stability: Not on file       Family History   Problem Relation Age of Onset     Rheumatoid Arthritis Mother      Rheumatoid Arthritis Other      Diabetes No family hx of      Coronary Artery Disease No family hx of      Hypertension No family hx of   "    Hyperlipidemia No family hx of      Cerebrovascular Disease No family hx of      Breast Cancer No family hx of      Colon Cancer No family hx of      Prostate Cancer No family hx of      Other Cancer No family hx of      Depression No family hx of      Anxiety Disorder No family hx of      Mental Illness No family hx of      Substance Abuse No family hx of      Anesthesia Reaction No family hx of      Asthma No family hx of      Osteoporosis No family hx of      Genetic Disorder No family hx of      Thyroid Disease No family hx of      Obesity No family hx of      Unknown/Adopted No family hx of      Family history reviewed and edited as appropriate    Medications and Allergies:     Outpatient Encounter Medications as of 1/11/2022   Medication Sig Dispense Refill     dexamethasone (DECADRON) 1 MG tablet 0.5 mg daily plus an additional 5 doses per month as needed 105 tablet 3     dexamethasone (DECADRON) 1 MG tablet 0.5 mg daily plus an additional 5 doses per month as needed 90 tablet 2     levothyroxine (SYNTHROID/LEVOTHROID) 125 MCG tablet Take 1 tablet (125 mcg) by mouth daily 90 tablet 3     sertraline (ZOLOFT) 25 MG tablet TAKE 1 TABLET(25 MG) BY MOUTH DAILY 90 tablet 0     Sildenafil Citrate (VIAGRA PO)        Somatropin (NORDITROPIN FLEXPRO) 5 MG/1.5ML SOPN Inject 0.5 mg Subcutaneous daily 4.5 mL 5     Somatropin (NUTROPIN AQ NUSPIN 5) 5 MG/2ML SOPN Inject 0.5 mg Subcutaneous daily 0.5 mg inject subcutaneous once daily 6 mL 5     testosterone cypionate (DEPOTESTOSTERONE) 200 MG/ML injection Inject 0.5 mLs (100 mg) into the muscle once a week 6 mL 5     hydrocortisone sodium succinate PF (SOLU-CORTEF) 100 MG injection Inject 2 mLs (100 mg) into the vein once for 1 dose Dispense Act-O-Vial 2 mL 1     insulin pen needle (PENTIPS) 31G X 8 MM miscellaneous Use  For Somatropin (NUTROPIN injection daily. Subcutaneous 100 each 2     syringe/needle, disp, 23G X 1\" 3 ML MISC Use 1x weekly with testosterone " "injection. 15 each 3     syringe/needle, disp, 23G X 1\" 3 ML MISC Use 1x weekly with testosterone injection. 13 each 0     [DISCONTINUED] Lactobacillus (ACIDOPHILUS) CAPS Take 1 capsule by mouth       No facility-administered encounter medications on file as of 1/11/2022.        No Known Allergies     Review of systems:  A full 10 point review of systems was obtained and was negative except for the pertinent positives and negatives stated within the HPI.    Objective Findings:   Physical Exam:    Constitutional: There were no vitals taken for this visit.  General: Alert, cooperative, no distress, well-appearing  Head: Atraumatic, normocephalic, no obvious abnormalities   Eyes: EOMI, Sclera anicteric, no obvious conjunctival hemorrhage   Nose: Nares normal, no obvious malformation, no obvious rhinorrhea   Respiratory: normal appearing respirations, no cough  Musculoskeletal: Range of motion intact, no obvious strength deficit  Skin: No jaundice, no obvious rash  Neurologic: AAOx3, no obvious neurologic abnormality  Psychiatric: Normal Affect, appropriate mood  Extremities: No obvious edema, no obvious malformation     Labs, Radiology, Pathology     Lab Results   Component Value Date    WBC 7.3 05/27/2021    HGB 14.1 05/27/2021     05/27/2021    CHOL 290 (H) 05/27/2021    CHOL 238 (H) 02/19/2020    TRIG 86 05/27/2021    TRIG 104 02/19/2020    HDL 78 05/27/2021    HDL 77 02/19/2020    ALT 20 02/19/2020    ALT 23 10/02/2018    AST 19 02/19/2020    AST 26 10/02/2018     05/27/2021     02/19/2020     10/02/2018    BUN 10 05/27/2021    BUN 8 02/19/2020    BUN 8 10/02/2018    CO2 28 05/27/2021    CO2 29 02/19/2020    CO2 29 10/02/2018    TSH 0.07 (L) 05/14/2021    TSH 0.03 (L) 02/19/2020    TSH 0.01 (L) 10/02/2018        Liver Function Studies -   Recent Labs   Lab Test 02/19/20  0928   PROTTOTAL 7.1   ALBUMIN 3.7   BILITOTAL 0.6   ALKPHOS 52   AST 19   ALT 20          Patient Active Problem List    " Diagnosis Date Noted     History of colonic polyps 01/12/2022     Priority: Medium     CARDIOVASCULAR SCREENING; LDL GOAL LESS THAN 160 05/25/2021     Priority: Medium     Moderate episode of recurrent major depressive disorder (H) 05/25/2021     Priority: Medium     Growth hormone deficiency (H) 03/01/2021     Priority: Medium     Hypogonadism male 03/01/2021     Priority: Medium     History of open sigmoidectomy 12/12/2018     Priority: Medium     Hypopituitarism (H) 07/12/2018     Priority: Medium     Acromegaly (H) 07/12/2018     Priority: Medium     Diagnosed in 2009. Treated with partial transphenoidal hypophysectomy in December 2009 at [facility], Gamma knife radiosurgery in June 2010, and complete transphenoidal hypophysectomy in early 2016.     Currently managed with hormone replacement for hypopituitarism.       Malignant neoplasm of sigmoid colon (H) 07/12/2018     Priority: Medium     2011. Localized to one polyp but into lymph tissue. Removed entire sigmoid and associated lymph tissue. No chemo.       Pituitary adenoma (H) 12/15/2015     Priority: Medium     Asymptomatic LV dysfunction 08/30/2012     Priority: Medium     Formatting of this note might be different from the original.  Echo 5-7-12 showed moderately dilated LV, ED 64 mm, ES 44 mm. EF 50%. On 5/28/2005 EF was 55% with ED 61, ES 44. Follow up 7-17-13 echo showed EF again at 55% with LVED 61 mm.    Last Assessment & Plan:   Formatting of this note might be different from the original.  Discussed the posibility that this could represent a pathologic process. Will plan to get a FU echo in early 2013 to track the LV function.        Assessment and Plan   Assessment:    Ivan D Welander is a 41 year old male with acromegaly sp resection and gamma knife, secondary adrenal insufficiency, left sided colon cancer sp resection who is presenting as a new patient in consultation at the request of Dr. Tejada with a chief complaint of need for  colonoscopy with history of acromegaly and colon cancer.    The patient was seen in video telemedicine consultation regarding his need for colonoscopy with a prior history of colon cancer in the setting of acromegaly. His last colonoscopy was in 2017 at [facility] and he is due now based on the absence of polyps on his prior colonoscopy. We will plan to proceed with colonoscopy for colon cancer surveillance. The patient wishes to wait until the current surge of COVID cases is less and I agree with him in his approach as there is no urgent indication at this time.     I performed a literature search to determine if he requires an upper endoscopy and from what I was able to find, there is no increased risk of gastric cancer with his history of acromegaly. I have also sent a message to his endocrinologist to verify in the event there are other data that I did not come across in my search. If it is brought to my attention that an EGD is needed as well, we can perform that at the same time as the colonoscopy however for now we will proceed with colonoscopy only.     1. Malignant neoplasm of sigmoid colon (H)    2. History of colonic polyps    3. Acromegaly (H)    4. History of open sigmoidectomy    5. Pituitary adenoma (H)       Orders Placed This Encounter   Procedures     Adult Gastro Ref - Procedure Only      Plan:  1. Please schedule your colonoscopy at your convenience. It is okay to wait for the cases of COVID to diminish given the high volume of cases at present  2. On my literature search, I did not see anything to support upper endoscopy or that you are at a higher risk of a gastric malignancy. I have sent a message to your endocrinologist as well in case there are data that I did not come across. For now we will plan on only a colonoscopy unless more information is brought to my attention.    Follow up plan:   Return to clinic as needed.    The risks and benefits of my recommendations, as well as other treatment  options were discussed with the patient and any available family today. All questions were answered.     o Follow up: As planned above. Today, I personally spent 16 minutes in direct face to face time with the patient, of which greater than 50% of the time was spent in patient education and counseling as described above. Approximately 20 minutes were spent on indirect care associated with the patient's consultation including but not limited to review of: patient medical records to date, clinic visits, hospital records, lab results, imaging studies, procedural documentation, and coordinating care with other providers. The findings from this review are summarized in the above note. All of the above accounted for a cumulative time of 36 and was performed on the date of service.     The patient verbalized understanding of the plan and was appreciative for the time spent and information provided during the office visit.     Author:   Ovi Lepe DO   of Medicine  Program Head, Esophageal Disorders Program  Division of Gastroenterology, Hepatology, and Nutrition  [facility]     Documentation assisted by voice recognition and documentation system.

## 2022-01-11 NOTE — LETTER
1/11/2022         RE: Ivan D Welander  [address]  [facility] [address]        Dear Colleague,    Thank you for referring your patient, Ivan D Welander, to the [facility]STROENTEROLOGY [facility]IN[facility] [place]. Please see a copy of my visit note below.      Gastroenterology Visit for: Ivan D Welander 1980   MRN: [id number]     Reason for Visit:  chief complaint    Referred by: Terrell  / [facility] / [facility] [id number]  Patient Care Team:  Sabine Stinson MD as PCP - General (Family Practice)  Damaris Tejada MD as Assigned Endocrinology Provider  Samra Hidalgo MD as Assigned PCP  Ovi Lepe DO as MD (Gastroenterology)    History of Present Illness:   Ivan D Welander is a 41 year old male with acromegaly sp resection and gamma knife, left sided colon cancer sp resection who is presenting as a new patient in consultation at the request of Dr. Tejada with a chief complaint of need for colonoscopy with history of acromegaly and colon cancer.  ---------------------------------------------------------------  Ivan D Welander states in 2009 he was diagnosed with acromegaly. He underwent transphenoidal surgery, then gamma knife. The patient underwent colonoscopy in 2010 and found two polyps with cancer and underwent sigmoid resection. Polyps had cancer with local lymphatic invasion. No amelia invasion. Had a repeat pituitary surgery in 2016/2017.     No fecal incontinence but believes surgery involved a portion of the anus.     The patient does state varied stools. He reports less fully formed stools on occasion.     Otherwise negative review of systems  ---------------------------------------------------------------     Ivan D Welander denies dysphagia, odynophagia, heartburn/reflux, nausea, vomiting, early satiety, abdominal pain, abdominal distension/bloating, diarrhea, constipation, hematochezia, or melena. No unintentional weight loss.     Family history of colon cancer: ?grandmother  (in her late 70s and 80s)  Wt Readings from Last 5 Encounters:   03/11/20 87.7 kg (193 lb 4.8 oz)   03/06/19 85.6 kg (188 lb 12.8 oz)   10/02/18 84 kg (185 lb 3.2 oz)   07/12/18 79.9 kg (176 lb 3.2 oz)        Esophageal Questionnaire(s)    BEDQ Questionnaire  No flowsheet data found.  No flowsheet data found.    Eckardt Questionnaire  No flowsheet data found.    Promis 10 Questionnaire  No flowsheet data found.        STUDIES & PROCEDURES:    EGD:   Date:  Impression:  Pathology Report:    Colonoscopy:  Date: 4/17/17  Impression:  1.  Small non-bleeding, internal and non-inflamed, internal  hemorrhoids  2.  Mild diverticulosis was noted  3.  A surgical change were found in the rectum  4.  Normal mucosa was found in rectum seen upon the retroflexed view, in the  descending colon, at the splenic flexure, in the transverse colon, at the  hepatic flexure, in the ascending colon, at the cecum, ileocecal valve, and  throughout the entire examined colon  5.  Retroflexion was not performed    RECOMMENDATIONS:     fiber rich diet  hemoccult stool examinations yearly  yearly rectal examination  REPEAT EXAM:     1.  Repeat exam in 3 years.  2.  Colon cancer, acromegaly and history of colonic polyps.  Pathology Report:     EndoFLIP directed at the UES or LES (8cm (EF-325) balloon length or 16cm (EF-322) balloon length):   Date:  8cm balloon  Balloon inflation Balloon pressure CSA (mm^2) DI (mm^2/mmHg) Dmin (mm) Compliance   20 (ladmark ID)        30        40        50           16cm balloon  Balloon inflation Balloon pressure CSA (mm^2) DI (mm^2/mmHg) Dmin (mm) Compliance   30 (ladmark ID)        40        50        60        70           High Resolution Manometry:  Date:  Impression:    PH/Impedance:  Date:  Impression:     Bravo:  48 or 96hr  Date:  Impression:    CT:  Date:  Impression:    Esophagram:  Date:  Impression:     Prior medical records were reviewed including, but not limited to, notes from referring providers,  lab work, radiographic tests, and other diagnostic tests. Pertinent results were summarized above.     History     Past Medical History:   Diagnosis Date     Asymptomatic LV dysfunction 8/30/2012     Cancer (H)     Colon     Thyroid disease     Acromegaly       Past Surgical History:   Procedure Laterality Date     APPENDECTOMY       COLONOSCOPY       GI SURGERY      sigmoid colectomy     HEAD & NECK SURGERY  2010    transphenoidal hypophysectomy, repeat 2016       Social History     Socioeconomic History     Marital status:      Spouse name: Not on file     Number of children: Not on file     Years of education: Not on file     Highest education level: Not on file   Occupational History     Not on file   Tobacco Use     Smoking status: Never Smoker     Smokeless tobacco: Never Used   Substance and Sexual Activity     Alcohol use: Yes     Comment: Only so often     Drug use: No     Sexual activity: Yes     Partners: Female     Birth control/protection: None   Other Topics Concern     Not on file   Social History Narrative     Not on file     Social Determinants of Health     Financial Resource Strain: Not on file   Food Insecurity: Not on file   Transportation Needs: Not on file   Physical Activity: Not on file   Stress: Not on file   Social Connections: Not on file   Intimate Partner Violence: Not on file   Housing Stability: Not on file       Family History   Problem Relation Age of Onset     Rheumatoid Arthritis Mother      Rheumatoid Arthritis Other      Diabetes No family hx of      Coronary Artery Disease No family hx of      Hypertension No family hx of      Hyperlipidemia No family hx of      Cerebrovascular Disease No family hx of      Breast Cancer No family hx of      Colon Cancer No family hx of      Prostate Cancer No family hx of      Other Cancer No family hx of      Depression No family hx of      Anxiety Disorder No family hx of      Mental Illness No family hx of      Substance Abuse No  "family hx of      Anesthesia Reaction No family hx of      Asthma No family hx of      Osteoporosis No family hx of      Genetic Disorder No family hx of      Thyroid Disease No family hx of      Obesity No family hx of      Unknown/Adopted No family hx of      Family history reviewed and edited as appropriate    Medications and Allergies:     Outpatient Encounter Medications as of 1/11/2022   Medication Sig Dispense Refill     dexamethasone (DECADRON) 1 MG tablet 0.5 mg daily plus an additional 5 doses per month as needed 105 tablet 3     dexamethasone (DECADRON) 1 MG tablet 0.5 mg daily plus an additional 5 doses per month as needed 90 tablet 2     levothyroxine (SYNTHROID/LEVOTHROID) 125 MCG tablet Take 1 tablet (125 mcg) by mouth daily 90 tablet 3     sertraline (ZOLOFT) 25 MG tablet TAKE 1 TABLET(25 MG) BY MOUTH DAILY 90 tablet 0     Sildenafil Citrate (VIAGRA PO)        Somatropin (NORDITROPIN FLEXPRO) 5 MG/1.5ML SOPN Inject 0.5 mg Subcutaneous daily 4.5 mL 5     Somatropin (NUTROPIN AQ NUSPIN 5) 5 MG/2ML SOPN Inject 0.5 mg Subcutaneous daily 0.5 mg inject subcutaneous once daily 6 mL 5     testosterone cypionate (DEPOTESTOSTERONE) 200 MG/ML injection Inject 0.5 mLs (100 mg) into the muscle once a week 6 mL 5     hydrocortisone sodium succinate PF (SOLU-CORTEF) 100 MG injection Inject 2 mLs (100 mg) into the vein once for 1 dose Dispense Act-O-Vial 2 mL 1     insulin pen needle (PENTIPS) 31G X 8 MM miscellaneous Use  For Somatropin (NUTROPIN injection daily. Subcutaneous 100 each 2     syringe/needle, disp, 23G X 1\" 3 ML MISC Use 1x weekly with testosterone injection. 15 each 3     syringe/needle, disp, 23G X 1\" 3 ML MISC Use 1x weekly with testosterone injection. 13 each 0     [DISCONTINUED] Lactobacillus (ACIDOPHILUS) CAPS Take 1 capsule by mouth       No facility-administered encounter medications on file as of 1/11/2022.        No Known Allergies     Review of systems:  A full 10 point review of systems " was obtained and was negative except for the pertinent positives and negatives stated within the HPI.    Objective Findings:   Physical Exam:    Constitutional: There were no vitals taken for this visit.  General: Alert, cooperative, no distress, well-appearing  Head: Atraumatic, normocephalic, no obvious abnormalities   Eyes: EOMI, Sclera anicteric, no obvious conjunctival hemorrhage   Nose: Nares normal, no obvious malformation, no obvious rhinorrhea   Respiratory: normal appearing respirations, no cough  Musculoskeletal: Range of motion intact, no obvious strength deficit  Skin: No jaundice, no obvious rash  Neurologic: AAOx3, no obvious neurologic abnormality  Psychiatric: Normal Affect, appropriate mood  Extremities: No obvious edema, no obvious malformation     Labs, Radiology, Pathology     Lab Results   Component Value Date    WBC 7.3 05/27/2021    HGB 14.1 05/27/2021     05/27/2021    CHOL 290 (H) 05/27/2021    CHOL 238 (H) 02/19/2020    TRIG 86 05/27/2021    TRIG 104 02/19/2020    HDL 78 05/27/2021    HDL 77 02/19/2020    ALT 20 02/19/2020    ALT 23 10/02/2018    AST 19 02/19/2020    AST 26 10/02/2018     05/27/2021     02/19/2020     10/02/2018    BUN 10 05/27/2021    BUN 8 02/19/2020    BUN 8 10/02/2018    CO2 28 05/27/2021    CO2 29 02/19/2020    CO2 29 10/02/2018    TSH 0.07 (L) 05/14/2021    TSH 0.03 (L) 02/19/2020    TSH 0.01 (L) 10/02/2018        Liver Function Studies -   Recent Labs   Lab Test 02/19/20  0928   PROTTOTAL 7.1   ALBUMIN 3.7   BILITOTAL 0.6   ALKPHOS 52   AST 19   ALT 20          Patient Active Problem List    Diagnosis Date Noted     History of colonic polyps 01/12/2022     Priority: Medium     CARDIOVASCULAR SCREENING; LDL GOAL LESS THAN 160 05/25/2021     Priority: Medium     Moderate episode of recurrent major depressive disorder (H) 05/25/2021     Priority: Medium     Growth hormone deficiency (H) 03/01/2021     Priority: Medium     Hypogonadism male  03/01/2021     Priority: Medium     History of open sigmoidectomy 12/12/2018     Priority: Medium     Hypopituitarism (H) 07/12/2018     Priority: Medium     Acromegaly (H) 07/12/2018     Priority: Medium     Diagnosed in 2009. Treated with partial transphenoidal hypophysectomy in December 2009 at [facility], Gamma knife radiosurgery in June 2010, and complete transphenoidal hypophysectomy in early 2016.     Currently managed with hormone replacement for hypopituitarism.       Malignant neoplasm of sigmoid colon (H) 07/12/2018     Priority: Medium     2011. Localized to one polyp but into lymph tissue. Removed entire sigmoid and associated lymph tissue. No chemo.       Pituitary adenoma (H) 12/15/2015     Priority: Medium     Asymptomatic LV dysfunction 08/30/2012     Priority: Medium     Formatting of this note might be different from the original.  Echo 5-7-12 showed moderately dilated LV, ED 64 mm, ES 44 mm. EF 50%. On 5/28/2005 EF was 55% with ED 61, ES 44. Follow up 7-17-13 echo showed EF again at 55% with LVED 61 mm.    Last Assessment & Plan:   Formatting of this note might be different from the original.  Discussed the posibility that this could represent a pathologic process. Will plan to get a FU echo in early 2013 to track the LV function.        Assessment and Plan   Assessment:    Ivan D Welander is a 41 year old male with acromegaly sp resection and gamma knife, secondary adrenal insufficiency, left sided colon cancer sp resection who is presenting as a new patient in consultation at the request of Dr. Tejada with a chief complaint of need for colonoscopy with history of acromegaly and colon cancer.    The patient was seen in video telemedicine consultation regarding his need for colonoscopy with a prior history of colon cancer in the setting of acromegaly. His last colonoscopy was in 2017 at [facility] and he is due now based on the absence of polyps on his prior colonoscopy. We will plan to  proceed with colonoscopy for colon cancer surveillance. The patient wishes to wait until the current surge of COVID cases is less and I agree with him in his approach as there is no urgent indication at this time.     I performed a literature search to determine if he requires an upper endoscopy and from what I was able to find, there is no increased risk of gastric cancer with his history of acromegaly. I have also sent a message to his endocrinologist to verify in the event there are other data that I did not come across in my search. If it is brought to my attention that an EGD is needed as well, we can perform that at the same time as the colonoscopy however for now we will proceed with colonoscopy only.     1. Malignant neoplasm of sigmoid colon (H)    2. History of colonic polyps    3. Acromegaly (H)    4. History of open sigmoidectomy    5. Pituitary adenoma (H)       Orders Placed This Encounter   Procedures     Adult Gastro Ref - Procedure Only      Plan:  1. Please schedule your colonoscopy at your convenience. It is okay to wait for the cases of COVID to diminish given the high volume of cases at present  2. On my literature search, I did not see anything to support upper endoscopy or that you are at a higher risk of a gastric malignancy. I have sent a message to your endocrinologist as well in case there are data that I did not come across. For now we will plan on only a colonoscopy unless more information is brought to my attention.    Follow up plan:   Return to clinic as needed.    The risks and benefits of my recommendations, as well as other treatment options were discussed with the patient and any available family today. All questions were answered.     o Follow up: As planned above. Today, I personally spent 16 minutes in direct face to face time with the patient, of which greater than 50% of the time was spent in patient education and counseling as described above. Approximately 20 minutes were  spent on indirect care associated with the patient's consultation including but not limited to review of: patient medical records to date, clinic visits, hospital records, lab results, imaging studies, procedural documentation, and coordinating care with other providers. The findings from this review are summarized in the above note. All of the above accounted for a cumulative time of 36 and was performed on the date of service.     The patient verbalized understanding of the plan and was appreciative for the time spent and information provided during the office visit.     Author:   Ovi Lepe DO   of Medicine  Program Head, Esophageal Disorders Program  Division of Gastroenterology, Hepatology, and Nutrition  [facility]     Documentation assisted by voice recognition and documentation system.

## 2022-01-12 PROBLEM — Z86.0100 HISTORY OF COLONIC POLYPS: Status: ACTIVE | Noted: 2022-01-12

## 2022-01-13 ENCOUNTER — TELEPHONE (OUTPATIENT)
Dept: GASTROENTEROLOGY | Facility: CLINIC | Age: 42
End: 2022-01-13
Payer: COMMERCIAL

## 2022-01-13 DIAGNOSIS — E23.0 HYPOPITUITARISM (H): ICD-10-CM

## 2022-01-14 DIAGNOSIS — E23.0 HYPOPITUITARISM (H): ICD-10-CM

## 2022-01-14 DIAGNOSIS — E23.0 GROWTH HORMONE DEFICIENCY (H): ICD-10-CM

## 2022-01-14 RX ORDER — TESTOSTERONE CYPIONATE 200 MG/ML
INJECTION, SOLUTION INTRAMUSCULAR
Qty: 6 ML | OUTPATIENT
Start: 2022-01-14

## 2022-01-18 RX ORDER — SOMATROPIN 5 MG/2ML
0.5 INJECTION, SOLUTION SUBCUTANEOUS DAILY
Qty: 6 ML | Refills: 2 | Status: SHIPPED | OUTPATIENT
Start: 2022-01-18 | End: 2022-07-24

## 2022-01-18 NOTE — TELEPHONE ENCOUNTER
Somatropin (NUTROPIN AQ NUSPIN 5) 5 MG/2ML SOPN      Last Written Prescription Date:  5/27/2021  Last Fill Quantity: 6 ml,   # refills: 5    Somatropin (NORDITROPIN FLEXPRO) 5 MG/1.5ML SOPN     Last Written Prescription Date:  9/16/2021  Last Fill Quantity: 4.5 ml,   # refills: 5     Last Office Visit : 3/10/2021  Future Office visit:  none    Routing refill request to provider for review/approval because:  Controlled medication.  - requested as Somatropin (NUTROPIN AQ NUSPIN 5) 5 MG/2ML SOPN    - last filled as Somatropin (NORDITROPIN FLEXPRO) 5 MG/1.5ML SOPN 9/16/2021  - plan last visit 3/10/2021 RTC 1 year

## 2022-01-27 ENCOUNTER — TELEPHONE (OUTPATIENT)
Dept: GASTROENTEROLOGY | Facility: CLINIC | Age: 42
End: 2022-01-27
Payer: COMMERCIAL

## 2022-01-27 NOTE — TELEPHONE ENCOUNTER
Screening Questions  Blue=prep questions Red=location Green=sedation   1. Are you active on mychart? Y    2. What insurance is in the chart? [facility]     3.  Ordering/Referring Provider: Ovi Lepe DO    4. BMI 26.4, If greater than 40 review exclusion criteria also will need EXTENDED PREP    5.  Respiratory Screening (If yes to any of the following HOSPITAL setting only):     Do you use daily home oxygen? N  Do you have mod to severe Obstructive Sleep Apnea? N (can be seen at [place] or hospital setting)    Do you have Pulmonary Hypertension? N   Do you have UNCONTROLLED asthma? N    6. Have you had a heart or lung transplant? N  (If yes, please review exclusion criteria)    7. Are you currently on dialysis?N  (If yes, schedule in HOSPITAL setting only)(If yes, please send Golytely prep)    8. Do you have chronic kidney disease? N (If yes, please send Golytely prep)    9. Have you had a stroke or Transient ischemic attack (TIA) within 6 months? N (If yes, do not schedule at [place])    10. In the past 6 months, have you had any heart related issues including cardiomyopathy or heart attack? N (If yes, please review exclusion criteria)           If yes, did it require cardiac stenting or other implantable device?  (If yes, please review exclusion criteria)      11. Do you have any implantable devices in your body (pacemaker, defib, LVAD)? N (If yes, schedule at [facility])    12. Do you take nitroglycerin? If yes, how often? N (if yes, schedule at HOSPITAL setting)    13. Are you currently taking any blood thinners?N (If yes- inform patient to follow up with PCP or provider for follow up instructions)     14. Are you a diabetic? N (If yes, please send Golytely prep)    15. (Females) Are you currently pregnant?   If yes, how many weeks?      16. Are you taking any prescription pain medications on a routine schedule? N If yes, MAC sedation and patient will need EXTENDED PREP.    17. Do you have any chemical dependencies such as  alcohol, street drugs, or methadone? N If yes, MAC sedation     18. Do you have any history of post-traumatic stress syndrome, severe anxiety or history of psychosis? N  If yes, MAC sedation.     19. Do you transfer independently? Y    20.  Do you have any issues with constipation? N   If yes, pt will need EXTENDED PREP     21. Preferred Pharmacy for Pre Prescription [facility]    Scheduling Details    Which Colonoscopy Prep was Sent?: MPREP  Type of Procedure Scheduled: COLON  Surgeon: PEG  Date of Procedure:   [place] [facility]  Caller (Please ask for phone number if not scheduled by patient):       Sedation Type: MAC  Conscious Sedation- Needs  for 6 hours after the procedure  MAC/General-Needs  for 24 hours after procedure    Pre-op Required at [facility], [place], [place] and OR for MAC sedation: N  (if yes advise patient they will need a pre-op prior to procedure)      Informed patient they will need an adult  Y  Cannot take any type of public or medical transportation alone    Pre-Procedure Covid test to be completed at [facility] or Externally:     Confirmed Nurse will call to complete assessment Y    Additional comments:  (DE RAKESH'S PATIENTS NEED EXTENDED PREP)  Patient wanted to schedule in June no MAC open yet he said he will call  to June to schedule

## 2022-02-11 ENCOUNTER — MYC MEDICAL ADVICE (OUTPATIENT)
Dept: FAMILY MEDICINE | Facility: CLINIC | Age: 42
End: 2022-02-11
Payer: COMMERCIAL

## 2022-02-11 ENCOUNTER — TELEPHONE (OUTPATIENT)
Dept: ENDOCRINOLOGY | Facility: CLINIC | Age: 42
End: 2022-02-11
Payer: COMMERCIAL

## 2022-02-11 NOTE — TELEPHONE ENCOUNTER
[facility] Call Center    Phone Message    May a detailed message be left on voicemail: yes     Reason for Call: Order(s): Other:   Reason for requested: labs  Date needed: whenever possible    Provider name: Terrell    Pt needs new orders in system for labs.  Please let pt know when they should be scheduled.  He is currently scheduled 8/3/22      Action Taken: Message routed to:  [facility] endo    Travel Screening: Not Applicable

## 2022-02-14 NOTE — TELEPHONE ENCOUNTER
Dr. Hidalgo-  1. 4th COVID-19 vaccination is not on patient's snapshot/health maintenance alerts  2. Patient would like to clarify whether a 4th COVID-19 vaccination is recommended for him because he takes Dexamethasone?   A. Perhaps this question should be directed to patient's Endocrinologist?    Writer responded via [employer].      Thank you!  ANA LAURA MorenoN, RN  [facility]

## 2022-03-03 ENCOUNTER — HOSPITAL ENCOUNTER (OUTPATIENT)
Facility: AMBULATORY SURGERY CENTER | Age: 42
End: 2022-03-03
Attending: INTERNAL MEDICINE
Payer: COMMERCIAL

## 2022-03-03 ENCOUNTER — TELEPHONE (OUTPATIENT)
Dept: GASTROENTEROLOGY | Facility: CLINIC | Age: 42
End: 2022-03-03
Payer: COMMERCIAL

## 2022-03-03 NOTE — TELEPHONE ENCOUNTER
Screening Questions  BlueKIND OF PREP RedLOCATION [review exclusion criteria] GreenSEDATION TYPE    1. Have you had a positive covid test in the last 90 days? N     2. Are you active on mychart? Y    3. What insurance is in the chart? [facility]     3.  Ordering/Referring Provider: KRISTI RUVALCABA     4. BMI 26.5 [BMI OVER 40-EXTENDED PREP]  If greater than 40 review exclusion criteria [PAC APPT IF @ [facility]]    5.  Respiratory Screening :  [If yes to any of the following HOSPITAL setting only]     Do you use daily home oxygen? N    Do you have mod to severe Obstructive Sleep Apnea? N  [OKAY @ [facility] SH PH RI]   Do you have Pulmonary Hypertension? N     Do you have UNCONTROLLED asthma? N      6. Have you had a heart or lung transplant? N      7. Are you currently on dialysis? N [ If yes, G-PREP & HOSPITAL setting only]     8. Do you have chronic kidney disease? N [ If yes, G-PREP ]    9. Have you had a stroke or Transient ischemic attack (TIA) within 6 months? N (If yes, do not schedule at [facility])    10. In the past 6 months, have you had any heart related issues including cardiomyopathy or heart attack? N        If yes, did it require cardiac stenting or other implantable device? N      11. Do you have any implantable devices in your body (pacemaker, defib, LVAD)? N (If yes, schedule at [facility])    12. Do you take nitroglycerin? N   If yes, how often? N  (if yes, HOSPITAL setting ONLY)    13. Are you currently taking any blood thinners? N   [IF YES, INFORM PATIENT TO FOLLOW UP W/ ORDERING PROVIDER FOR BRIDGING INSTRUCTIONS]     14. Are you a diabetic? N   [ If yes, G-PREP ]    15. [FEMALES] Are you currently pregnant? NA    If yes, how many weeks? NA    16. Are you taking any prescription pain medications on a routine schedule?  N  [ If yes, EXTENDED PREP.] [If yes, MAC]    17. Do you have any chemical dependencies such as alcohol, street drugs, or methadone?  N [If yes, MAC]    18. Do you have any history of  post-traumatic stress syndrome, severe anxiety or history of psychosis?  N  [If yes, MAC]    19. Do you transfer independently?  Y    20.  Do you have any issues with constipation?  N  [ If yes, EXTENDED PREP.]    21. Preferred LOCAL Pharmacy for Pre Prescription     [facility] - [facility] [place] - [address] [id number] - [place], [place] - [address] [id number] - [place], [place] - [address] [facility] - [place], [place] [address]  [id number] - [place], [place] [address] [facility] [address] [facility] - [place], [place] - [address] [id number] - [place] - [address]  [id number] - [place], [place] - [address] [facility] [address] [facility] [address] [facility] [address] [id number] - [place], [place] - [address] [facility] [address]  [facility] - [place], [place] - [address]  Scheduling Details      Caller : ELAINA   (Please ask for phone number if not scheduled by patient)    Type of Procedure Scheduled: COLONOSCOPT    Which Colonoscopy Prep was Sent?: SLADE MADERA CF PATIENTS & GRORICARDO'S PATIENTS NEEDS EXTENDED PREP  Surgeon: PEG   Date of Procedure: 4/28/2022  Location: [facility]      Sedation Type: MAC  Conscious Sedation- Needs  for 6 hours after the procedure  MAC/General-Needs  for 24 hours after procedure    Pre-op Required at [facility], [place], [place] and OR for MAC sedation: N  (advise patient they will need a pre-op prior to procedure -)      Informed patient they will need an adult  Y  Cannot take any type of public or medical transportation alone    Pre-Procedure Covid test to be completed at [facility] or Externally: Y    Confirmed Nurse will  call to complete assessment Y    Additional comments:

## 2022-03-14 DIAGNOSIS — Z11.59 ENCOUNTER FOR SCREENING FOR OTHER VIRAL DISEASES: Primary | ICD-10-CM

## 2022-03-26 ENCOUNTER — ANCILLARY PROCEDURE (OUTPATIENT)
Dept: MRI IMAGING | Facility: CLINIC | Age: 42
End: 2022-03-26
Attending: INTERNAL MEDICINE
Payer: COMMERCIAL

## 2022-03-26 DIAGNOSIS — E22.0 ACROMEGALY (H): ICD-10-CM

## 2022-03-26 PROCEDURE — 70543 MRI ORBT/FAC/NCK W/O &W/DYE: CPT | Mod: GC | Performed by: RADIOLOGY

## 2022-03-26 PROCEDURE — A9585 GADOBUTROL INJECTION: HCPCS | Performed by: RADIOLOGY

## 2022-03-26 RX ORDER — GADOBUTROL 604.72 MG/ML
10 INJECTION INTRAVENOUS ONCE
Status: COMPLETED | OUTPATIENT
Start: 2022-03-26 | End: 2022-03-26

## 2022-03-26 RX ADMIN — GADOBUTROL 10 ML: 604.72 INJECTION INTRAVENOUS at 13:38

## 2022-03-26 NOTE — DISCHARGE INSTRUCTIONS

## 2022-04-15 ENCOUNTER — MYC MEDICAL ADVICE (OUTPATIENT)
Dept: FAMILY MEDICINE | Facility: CLINIC | Age: 42
End: 2022-04-15
Payer: COMMERCIAL

## 2022-04-20 ENCOUNTER — TELEPHONE (OUTPATIENT)
Dept: GASTROENTEROLOGY | Facility: CLINIC | Age: 42
End: 2022-04-20

## 2022-04-20 ENCOUNTER — TELEPHONE (OUTPATIENT)
Dept: GASTROENTEROLOGY | Facility: CLINIC | Age: 42
End: 2022-04-20
Payer: COMMERCIAL

## 2022-04-20 NOTE — TELEPHONE ENCOUNTER
Caller: mich/wife [phone]    Procedure: Colonoscopy     Date, Location, and Surgeon of Procedure Cancelled: 04/28/2022 - [facility]     Ordering Provider:sergio     Reason for cancel (please be detailed, any staff messages or encounters to note?):       Per pt -- change date         Rescheduled: yes      If rescheduled:    Date: 06/30/2022   [place] [facility] [place]ent: yes (changes to prep?)   Covid Test Rescheduled: Yes and No - yes    Note any change or update to original order/sedation: n/a

## 2022-04-20 NOTE — TELEPHONE ENCOUNTER
Patient scheduled for colonoscopy  on 4/28/22.     Covid test scheduled: 4/25/22    Arrival time: 0600    Facility location: [facility]     Sedation type: MAC     Indication for procedure: hx of polyps    Anticoagulations? no     Bowel prep recommendation: Miralax/Magnesium citrate/Dulcolax     Pre visit planning completed.    Zehra Bansal RN

## 2022-04-21 NOTE — TELEPHONE ENCOUNTER
Writer responded via [employer].    ANA LAURA MorenoN, RN  [facility]

## 2022-04-27 ENCOUNTER — TELEPHONE (OUTPATIENT)
Dept: ENDOCRINOLOGY | Facility: CLINIC | Age: 42
End: 2022-04-27
Payer: COMMERCIAL

## 2022-04-27 NOTE — TELEPHONE ENCOUNTER
Patient was last seen by provider 3/10/2021, pt needs a recent office visit in order for us to submit a prior authorization.

## 2022-05-05 ASSESSMENT — ENCOUNTER SYMPTOMS
DYSURIA: 0
ABDOMINAL PAIN: 0
CONSTIPATION: 0
NERVOUS/ANXIOUS: 0
HEADACHES: 0
SHORTNESS OF BREATH: 0
DIARRHEA: 0
DIZZINESS: 0
ARTHRALGIAS: 0
HEARTBURN: 0
PALPITATIONS: 0
HEMATOCHEZIA: 0
FREQUENCY: 0
NAUSEA: 0
JOINT SWELLING: 0
MYALGIAS: 0
COUGH: 0
WEAKNESS: 0
FEVER: 0
SORE THROAT: 0
HEMATURIA: 0
PARESTHESIAS: 0
EYE PAIN: 0
CHILLS: 0

## 2022-05-05 ASSESSMENT — PATIENT HEALTH QUESTIONNAIRE - PHQ9
SUM OF ALL RESPONSES TO PHQ QUESTIONS 1-9: 3
SUM OF ALL RESPONSES TO PHQ QUESTIONS 1-9: 3
10. IF YOU CHECKED OFF ANY PROBLEMS, HOW DIFFICULT HAVE THESE PROBLEMS MADE IT FOR YOU TO DO YOUR WORK, TAKE CARE OF THINGS AT HOME, OR GET ALONG WITH OTHER PEOPLE: SOMEWHAT DIFFICULT

## 2022-05-06 ENCOUNTER — OFFICE VISIT (OUTPATIENT)
Dept: FAMILY MEDICINE | Facility: CLINIC | Age: 42
End: 2022-05-06
Payer: COMMERCIAL

## 2022-05-06 VITALS
RESPIRATION RATE: 15 BRPM | TEMPERATURE: 97.2 F | OXYGEN SATURATION: 96 % | SYSTOLIC BLOOD PRESSURE: 113 MMHG | HEART RATE: 92 BPM | DIASTOLIC BLOOD PRESSURE: 80 MMHG | WEIGHT: 203 LBS | BODY MASS INDEX: 26.05 KG/M2 | HEIGHT: 74 IN

## 2022-05-06 DIAGNOSIS — E23.0 HYPOPITUITARISM (H): ICD-10-CM

## 2022-05-06 DIAGNOSIS — E22.0 ACROMEGALY (H): ICD-10-CM

## 2022-05-06 DIAGNOSIS — Z00.00 ROUTINE GENERAL MEDICAL EXAMINATION AT A HEALTH CARE FACILITY: Primary | ICD-10-CM

## 2022-05-06 DIAGNOSIS — E23.0 GROWTH HORMONE DEFICIENCY (H): ICD-10-CM

## 2022-05-06 DIAGNOSIS — F33.1 MODERATE EPISODE OF RECURRENT MAJOR DEPRESSIVE DISORDER (H): ICD-10-CM

## 2022-05-06 DIAGNOSIS — D89.89 SUPPRESSION OF IMMUNE SYSTEM SUBTHERAPEUTIC (H): ICD-10-CM

## 2022-05-06 PROCEDURE — 99396 PREV VISIT EST AGE 40-64: CPT | Performed by: FAMILY MEDICINE

## 2022-05-06 ASSESSMENT — ENCOUNTER SYMPTOMS
ARTHRALGIAS: 0
HEMATURIA: 0
WEAKNESS: 0
PARESTHESIAS: 0
ABDOMINAL PAIN: 0
EYE PAIN: 0
FEVER: 0
HEARTBURN: 0
CHILLS: 0
HEADACHES: 0
DIARRHEA: 0
SORE THROAT: 0
MYALGIAS: 0
NAUSEA: 0
COUGH: 0
DYSURIA: 0
SHORTNESS OF BREATH: 0
DIZZINESS: 0
NERVOUS/ANXIOUS: 0
HEMATOCHEZIA: 0
JOINT SWELLING: 0
FREQUENCY: 0
PALPITATIONS: 0
CONSTIPATION: 0

## 2022-05-06 ASSESSMENT — PATIENT HEALTH QUESTIONNAIRE - PHQ9: SUM OF ALL RESPONSES TO PHQ QUESTIONS 1-9: 3

## 2022-05-06 NOTE — PROGRESS NOTES
SUBJECTIVE:   CC: Ivan D Welander is an 41 year old male who presents for preventative health visit and chronic disease management.    Hypopituitarism with GH deficiency, acromegaly Follow-up      Since last visit, patient describes the following symptoms: Weight stable, no hair loss, no skin changes, no constipation, no loose stools    He is followed by endocrinology (Dr. Tejada)    He is on chronic steroids, which is a concern for his immune status, will continue to vaccinate and boost for covid as recommendations evolve.    Depression Followup    How are you doing with your depression since your last visit? No change    Are you having other symptoms that might be associated with depression? No    Have you had a significant life event?  No     Are you feeling anxious or having panic attacks?   No    Do you have any concerns with your use of alcohol or other drugs? No    Social History     Tobacco Use     Smoking status: Never Smoker     Smokeless tobacco: Never Used   Substance Use Topics     Alcohol use: Yes     Comment: Only so often     Drug use: No     PHQ 7/12/2018 5/25/2021 5/5/2022   PHQ-9 Total Score 4 12 3   Q9: Thoughts of better off dead/self-harm past 2 weeks Not at all Not at all Not at all     MORA-7 SCORE 5/25/2021   Total Score 5       Suicide Assessment Five-step Evaluation and Treatment (SAFE-T)         Patient has been advised of split billing requirements and indicates understanding: Yes  Healthy Habits:     Getting at least 3 servings of Calcium per day:  Yes    Bi-annual eye exam:  NO    Dental care twice a year:  Yes    Sleep apnea or symptoms of sleep apnea:  Daytime drowsiness    Diet:  Regular (no restrictions)    Frequency of exercise:  None    Taking medications regularly:  No    Barriers to taking medications:  Problems remembering to take them    Medication side effects:  None    PHQ-2 Total Score: 0    Additional concerns today:  No  Answers for HPI/ROS submitted by the patient on  5/5/2022  If you checked off any problems, how difficult have these problems made it for you to do your work, take care of things at home, or get along with other people?: Somewhat difficult  PHQ9 TOTAL SCORE: 3    Today's PHQ-2 Score:   PHQ-2 ( 1999 [employer]) 5/5/2022   Q1: Little interest or pleasure in doing things 0   Q2: Feeling down, depressed or hopeless 0   PHQ-2 Score 0   PHQ-2 Total Score (12-17 Years)- Positive if 3 or more points; Administer PHQ-A if positive -   Q1: Little interest or pleasure in doing things Not at all   Q2: Feeling down, depressed or hopeless Not at all   PHQ-2 Score 0       Abuse: Current or Past(Physical, Sexual or Emotional)- No  Do you feel safe in your environment? Yes    Have you ever done Advance Care Planning? (For example, a Health Directive, POLST, or a discussion with a medical provider or your loved ones about your wishes): No, advance care planning information given to patient to review.  Patient plans to discuss their wishes with loved ones or provider.      Social History     Tobacco Use     Smoking status: Never Smoker     Smokeless tobacco: Never Used   Substance Use Topics     Alcohol use: Yes     Comment: Only so often     If you drink alcohol do you typically have >3 drinks per day or >7 drinks per week? No    Alcohol Use 5/6/2022   Prescreen: >3 drinks/day or >7 drinks/week? -   Prescreen: >3 drinks/day or >7 drinks/week? No       Last PSA: No results found for: PSA    Reviewed orders with patient. Reviewed health maintenance and updated orders accordingly - Yes  BP Readings from Last 3 Encounters:   05/06/22 113/80   03/11/20 115/77   03/06/19 113/78    Wt Readings from Last 3 Encounters:   05/06/22 92.1 kg (203 lb)   03/11/20 87.7 kg (193 lb 4.8 oz)   03/06/19 85.6 kg (188 lb 12.8 oz)                  Patient Active Problem List   Diagnosis     Hypopituitarism (H)     Acromegaly (H)     Malignant neoplasm of sigmoid colon (H)     Growth hormone deficiency (H)      Hypogonadism male     History of open sigmoidectomy     Pituitary adenoma (H)     Asymptomatic LV dysfunction     CARDIOVASCULAR SCREENING; LDL GOAL LESS THAN 160     Moderate episode of recurrent major depressive disorder (H)     History of colonic polyps     Past Surgical History:   Procedure Laterality Date     APPENDECTOMY       COLONOSCOPY       GI SURGERY  2011    sigmoid colectomy     HEAD & NECK SURGERY  2010    transphenoidal hypophysectomy, repeat 2016       Social History     Tobacco Use     Smoking status: Never Smoker     Smokeless tobacco: Never Used   Substance Use Topics     Alcohol use: Yes     Comment: Only so often     Family History   Problem Relation Age of Onset     Rheumatoid Arthritis Mother      Other Cancer Mother         Nose/skin cancer     Colon Cancer Maternal Grandmother      Cerebrovascular Disease Paternal Grandmother      Coronary Artery Disease Paternal Grandfather      Rheumatoid Arthritis Other      Diabetes Other      Hypertension No family hx of      Hyperlipidemia No family hx of      Breast Cancer No family hx of      Prostate Cancer No family hx of      Depression No family hx of      Anxiety Disorder No family hx of      Mental Illness No family hx of      Substance Abuse No family hx of      Anesthesia Reaction No family hx of      Asthma No family hx of      Osteoporosis No family hx of      Genetic Disorder No family hx of      Thyroid Disease No family hx of      Obesity No family hx of      Unknown/Adopted No family hx of          Current Outpatient Medications   Medication Sig Dispense Refill     dexamethasone (DECADRON) 1 MG tablet 0.5 mg daily plus an additional 5 doses per month as needed 105 tablet 3     dexamethasone (DECADRON) 1 MG tablet 0.5 mg daily plus an additional 5 doses per month as needed 90 tablet 2     insulin pen needle (PENTIPS) 31G X 8 MM miscellaneous Use  For Somatropin (NUTROPIN injection daily. Subcutaneous 100 each 2     levothyroxine  "(SYNTHROID/LEVOTHROID) 125 MCG tablet Take 1 tablet (125 mcg) by mouth daily 90 tablet 3     sertraline (ZOLOFT) 25 MG tablet TAKE 1 TABLET(25 MG) BY MOUTH DAILY 90 tablet 0     Sildenafil Citrate (VIAGRA PO)        Somatropin (NORDITROPIN FLEXPRO) 5 MG/1.5ML SOPN Inject 0.5 mg Subcutaneous daily 4.5 mL 5     Somatropin (NUTROPIN AQ NUSPIN 5) 5 MG/2ML SOPN Inject 0.5 mg Subcutaneous daily 6 mL 2     syringe/needle, disp, 23G X 1\" 3 ML MISC Use 1x weekly with testosterone injection. 15 each 3     syringe/needle, disp, 23G X 1\" 3 ML MISC Use 1x weekly with testosterone injection. 13 each 0     testosterone cypionate (DEPOTESTOSTERONE) 200 MG/ML injection Inject 0.5 mLs (100 mg) into the muscle once a week 6 mL 5     hydrocortisone sodium succinate PF (SOLU-CORTEF) 100 MG injection Inject 2 mLs (100 mg) into the vein once for 1 dose Dispense Act-O-Vial 2 mL 1     No Known Allergies    Reviewed and updated as needed this visit by clinical staff   Tobacco  Allergies  Meds   Med Hx  Surg Hx  Fam Hx  Soc Hx          Reviewed and updated as needed this visit by Provider                     Review of Systems   Constitutional: Negative for chills and fever.   HENT: Negative for congestion, ear pain, hearing loss and sore throat.    Eyes: Negative for pain and visual disturbance.   Respiratory: Negative for cough and shortness of breath.    Cardiovascular: Negative for chest pain, palpitations and peripheral edema.   Gastrointestinal: Negative for abdominal pain, constipation, diarrhea, heartburn, hematochezia and nausea.   Genitourinary: Positive for impotence. Negative for dysuria, frequency, genital sores, hematuria, penile discharge and urgency.   Musculoskeletal: Negative for arthralgias, joint swelling and myalgias.   Skin: Negative for rash.   Neurological: Negative for dizziness, weakness, headaches and paresthesias.   Psychiatric/Behavioral: Negative for mood changes. The patient is not nervous/anxious.  " "      OBJECTIVE:   /80 (BP Location: Left arm, Patient Position: Chair, Cuff Size: Adult Large)   Pulse 92   Temp 97.2  F (36.2  C) (Temporal)   Resp 15   Ht 1.867 m (6' 1.5\")   Wt 92.1 kg (203 lb)   SpO2 96%   BMI 26.42 kg/m      Physical Exam  GENERAL: healthy, alert and no distress  EYES: Eyes grossly normal to inspection, PERRL and conjunctivae and sclerae normal  HENT: ear canals and TM's normal, nose and mouth without ulcers or lesions  NECK: no adenopathy, no asymmetry, masses, or scars and thyroid normal to palpation  RESP: lungs clear to auscultation - no rales, rhonchi or wheezes  CV: regular rate and rhythm, normal S1 S2, no S3 or S4, no murmur, click or rub, no peripheral edema and peripheral pulses strong  ABDOMEN: soft, nontender, no hepatosplenomegaly, no masses and bowel sounds normal  MS: no gross musculoskeletal defects noted, no edema  SKIN: no suspicious lesions or rashes  NEURO: Normal strength and tone, mentation intact and speech normal  PSYCH: mentation appears normal, affect normal/bright      ASSESSMENT/PLAN:   (Z00.00) Routine general medical examination at a health care facility  (primary encounter diagnosis)  routine    (E23.0) Hypopituitarism (H)  With  (E22.0) Acromegaly (H)  And  (E23.0) Growth hormone deficiency (H)  Continue treatment as per endocrinologist.    (F33.1) moderate episode of recurrent major depressive disorder  Now table, at goal      COUNSELING:   Reviewed preventive health counseling, as reflected in patient instructions    Estimated body mass index is 26.42 kg/m  as calculated from the following:    Height as of this encounter: 1.867 m (6' 1.5\").    Weight as of this encounter: 92.1 kg (203 lb).     Weight management plan: Discussed healthy diet and exercise guidelines    He reports that he has never smoked. He has never used smokeless tobacco.      Counseling Resources:  ATP IV Guidelines  Pooled Cohorts Equation Calculator  FRAX Risk Assessment  ICSI " Preventive Guidelines  Dietary Guidelines for Americans, 2010  USDA's MyPlate  ASA Prophylaxis  Lung CA Screening    Samra Hidalgo MD  [facility]

## 2022-05-19 NOTE — TELEPHONE ENCOUNTER
Received request from [employer] if patient is still on therapy.  Noted that patient is still on therapy and we are pursuing PA renewal.  Just to follow up-patient needs appointment for PA review.

## 2022-05-24 ENCOUNTER — MYC MEDICAL ADVICE (OUTPATIENT)
Dept: ENDOCRINOLOGY | Facility: CLINIC | Age: 42
End: 2022-05-24
Payer: COMMERCIAL

## 2022-06-03 NOTE — TELEPHONE ENCOUNTER
Received request like the one below from "[employer] faxed with note Pt needs appointment before Pa can be renewed.     Faxed to "[employer] at [phone] 06/03/22 402pm cover plus 3 pages

## 2022-06-17 ENCOUNTER — VIRTUAL VISIT (OUTPATIENT)
Dept: ENDOCRINOLOGY | Facility: CLINIC | Age: 42
End: 2022-06-17
Payer: COMMERCIAL

## 2022-06-17 DIAGNOSIS — E22.0 ACROMEGALY (H): Primary | ICD-10-CM

## 2022-06-17 DIAGNOSIS — E78.5 DYSLIPIDEMIA: ICD-10-CM

## 2022-06-17 PROCEDURE — 99214 OFFICE O/P EST MOD 30 MIN: CPT | Mod: GT | Performed by: INTERNAL MEDICINE

## 2022-06-17 RX ORDER — SILDENAFIL 50 MG/1
50 TABLET, FILM COATED ORAL DAILY PRN
Qty: 50 TABLET | Refills: 3 | Status: SHIPPED | OUTPATIENT
Start: 2022-06-17 | End: 2023-11-23

## 2022-06-17 NOTE — LETTER
6/17/2022         RE: Ivan D Welander  [address]  [place] [address]        Dear Colleague,    Thank you for referring your patient, Ivan D Welander, to the [facility]. Please see a copy of my visit note below.    Ivan D Welander  is being evaluated via a billable video visit.      How would you like to obtain your AVS? iLive  For the video visit, send the invitation by: Text to cell phone: [phone]   Will anyone else be joining your video visit? No        Video start 2:50 pm  End 3:10 pm  [facility]                                                                               - Endocrinology Follow up -    Reason for visit/consult:  acromegaly    Primary care provider: No Ref-Primary, Physician    Assessment and Plan  A 42 year old male with acromegaly s/p TSS twice, RT, long term panhypopituitarism    # Acromegaly  Post TSS twice, last MRI 2/2016 post surgical changes.  Pituitary MRI was done in December 2018 no residual tumor.  We will do next follow-up MRI end of this year 2021 (ordered)    - IGF1 level to check (current run out GH therapy for 2 weeks)     # Secondary adrenal insufficiency  Since patient feels well , he prefers to stay on stay Dexamethasone 0.5 mg daily, rather than switching to hydrocortisone.  A1C 5.1 good.     - continue Dexamethasone 0.5 mg daily    - Sol-cotef PRN as usual      # Secondary hypothryoidism  Clinically euthyroid  -Continue current dose of levothyroxine 125 mcg daily.    # Secondary hypogonadism  Last lab showed elevated tesotsreone level to 1400 (5/2021) therefore we reduced dose, current 0.3 ml every week (60 mg)     - recheck total testosterone level   - PSA  - CBC        # GH deficiecy  Acromegaly, post TSS and RT, no residual.was started at [facility] for GH deficiency given no recurrence and colon cancer no recurrence.  Controversial but with this helping his quality of life and no active contraindicated issue on going, therefore I agreed  to continue.    IGF1 previous was normal range with GH injection.  good range, not overdosed.     - will proceed insurance process for appeal    -Continue current dose of growth hormone 0.5 mg subcu daily.     # history of colon cancer  This year he will go to colonoscopy    # dyslipidemia  Previously elevated LDL  - repeat again lipid panel      - RTC with me in 1 year.       35 minutes spent on the date of the encounter doing chart review, history and exam, documentation and further activities as noted above.    Damaris Tejada MD  Staff Physician  Endocrinology and Metabolism  [facility]  License: [id number]  Pager: [phone]    Interval History as of 6/17/2022 : Patient has been doing well.  Medication compliance  Excellelnt, but run out GH due to insurance . New event includes : no pertinent medical event noted .  Interval History as of 3/10/2021 : Patient has been doing well. Ran out GH for 2-3 weeks. Still on process of approval by new insurance. Otherwise doing well and stable.   Interval History as of 3/11/2020 : Patient has been doing well. No major medical event. Considering family planning, that he tried before, we will back to HCG therapy (previsously [facility] in 2017 he was on HCG 1500 every other day)  Interval history ; patient has been doing well, however felt a little bit weak since he is off growth hormone injection.  Therefore he was about to resume growth hormone again.  Also he has been missing testosterone injection mentioned some issue his insurance company in the pharmacy?  Otherwise has been stable taking dexamethasone 0.5 mg for adrenal insufficiency.  MRI negative.  HPI: A 37 yo male with acromegaly, here for the care transition due to interstate move.  He moved in 7/2018 from [place], originally from [place].    He was diagnosed Acromegaly in 2007, during the regular physical check up in [facility], and screened and diagnosed. Patient recalls that his  "pituitary tumor was 1 inch or so.   In 12/2009 first surgery was done at [facility], RT was done after that in [place] 6/2010.   2/2011 he underwent colonoscopy for Colon cancer screening, and results positive for malignancy, then underwent sigmoid resection.     Patient moved to [place], residual tumor detected. his second surgery 12/2015 at [facility] for residual tumor.     Patient has been taking Dexamethasone, testosterone, GH for his panhypopituitarism started since 2010. He used to be on Hydrocortisone, however tends to forgets afternoon dose, then switched to dexamethasone 0.5 mg in [place] due to missing dose.   Levothyroxine 125 mcg. Testosterone injection every week 100 mg.   Growth hormone (neutropin) 0.5 mg daily since 2017.   [facility] since he was missing dexamethasone for busy baby care, he [email or web address].     Last visit 3/20/2018 [facility] Neurosurgery Dr. Parada visit for GHD.     4/2017 colonoscoy: OK,     Past Medical/Surgical History:  Past Medical History:   Diagnosis Date     Asymptomatic LV dysfunction 08/30/2012     Cancer (H)     Colon     Depressive disorder 2011, 2021     Thyroid disease     Acromegaly     Past Surgical History:   Procedure Laterality Date     APPENDECTOMY       COLONOSCOPY       GI SURGERY  2011    sigmoid colectomy     HEAD & NECK SURGERY  2010    transphenoidal hypophysectomy, repeat 2016       Allergies:  No Known Allergies    Current Medications   Current Outpatient Medications   Medication     dexamethasone (DECADRON) 1 MG tablet     dexamethasone (DECADRON) 1 MG tablet     insulin pen needle (PENTIPS) 31G X 8 MM miscellaneous     levothyroxine (SYNTHROID/LEVOTHROID) 125 MCG tablet     sertraline (ZOLOFT) 25 MG tablet     Sildenafil Citrate (VIAGRA PO)     Somatropin (NORDITROPIN FLEXPRO) 5 MG/1.5ML SOPN     Somatropin (NUTROPIN AQ NUSPIN 5) 5 MG/2ML SOPN     syringe/needle, disp, 23G X 1\" 3 ML MISC     syringe/needle, disp, 23G X 1\" 3 ML MISC     " testosterone cypionate (DEPOTESTOSTERONE) 200 MG/ML injection     hydrocortisone sodium succinate PF (SOLU-CORTEF) 100 MG injection     No current facility-administered medications for this visit.       Family History:  Family History   Problem Relation Age of Onset     Rheumatoid Arthritis Mother      Other Cancer Mother         Nose/skin cancer     Colon Cancer Maternal Grandmother      Cerebrovascular Disease Paternal Grandmother      Coronary Artery Disease Paternal Grandfather      Rheumatoid Arthritis Other      Diabetes Other      Hypertension No family hx of      Hyperlipidemia No family hx of      Breast Cancer No family hx of      Prostate Cancer No family hx of      Depression No family hx of      Anxiety Disorder No family hx of      Mental Illness No family hx of      Substance Abuse No family hx of      Anesthesia Reaction No family hx of      Asthma No family hx of      Osteoporosis No family hx of      Genetic Disorder No family hx of      Thyroid Disease No family hx of      Obesity No family hx of      Unknown/Adopted No family hx of    Maternal uncle: some acromegalic feature    Social History:  Social History     Tobacco Use     Smoking status: Never Smoker     Smokeless tobacco: Never Used   Substance Use Topics     Alcohol use: Yes     Comment: Only so often       ROS:  Full review of systems taken with the help of the intake sheet. Otherwise a complete 14 point review of systems was taken and is negative unless stated in the history above.      Physical Exam:   There were no vitals taken for this visit.  General: well appearing, no acute distress, pleasant and conversant,   Mental Status/neuro: alert and oriented  Face: symmetrical, normal facial color  Eyes: anicteric, no proptosis or lid lag  Resp: normally breathing      Labs : I reviewed data from epic and extract and summarize the pertinent data here.               1/2018  TSH   0.02  Cortisol 5.1       2/19/2020 09:28   Sodium 138    Potassium 3.4   Chloride 104   Carbon Dioxide 29   Urea Nitrogen 8   Creatinine 0.87   GFR Estimate >90   GFR Estimate If Black >90   Calcium 8.5   Anion Gap 5   Albumin 3.7   Protein Total 7.1   Bilirubin Total 0.6   Alkaline Phosphatase 52   ALT 20   AST 19   Hemoglobin A1C 5.1   Cholesterol 238 (H)   FSH 1.0   HDL Cholesterol 77   LDL Cholesterol Calculated 140 (H)   Non HDL Cholesterol 161 (H)   T4 Free 1.27   Testosterone Total 972 (H)   Triglycerides 104   TSH 0.03 (L)   Glucose 84   Ins Growth Factor 1 120   Lutropin 0.2 (L)     MRI Brain: I personally reviewed the original images and agree with the below reports.   Brain/ Pituitary MRI without and with contrast     History: Acromegaly (H). 38 year old male with?acromegaly s/p TSS  twice, RT, long term panhypopituitarism. Post TSS  last MRI 2/2016  post surgical changes (no prior images available.)     ICD-10: Acromegaly (H)     Comparison:  None      Technique: Axial diffusion and FLAIR images of the whole brain  obtained without intravenous contrast. Sagittal T1 and T2-weighted,  coronal T2-weighted, coronal T1-weighted images with focus on the  sella were obtained without intravenous contrast. Post intravenous  contrast using gadolinium coronal and sagittal T1-weighted images were  obtained focused on the sella. Dynamic postcontrast coronal  T1-weighted images were also obtained.     Contrast: 7.5 mL Gadavist     Findings:       Postsurgical changes of transsphenoidal surgery. There are 2 foci of  T1 hyperintensity in the sella turcica without evidence of enhancement  (series 100, image 11). Few scattered punctate white matter T2  hyperintense foci.     No mass is demonstrated within the sella. The pituitary stalk appears  midline. The optic chiasm appears intact and not displaced.  The major cavernous carotid vascular flow-voids appear patent.       FLAIR images through the whole brain are unremarkable, and demonstrate  no mass effect, midline shift, or  significant enlargement of the  ventricles. Small mucous retention cysts in the left and right  maxillary sinuses.                                                                      Impression:  1. Postsurgical changes of transsphenoidal resection. No evidence of  recurrence.  2. Few scattered punctate white matter T2 hyperintense foci,  nonspecific.                         Again, thank you for allowing me to participate in the care of your patient.        Sincerely,        Damaris Tejada MD

## 2022-06-17 NOTE — PROGRESS NOTES
Ivan D Welander  is being evaluated via a billable video visit.      How would you like to obtain your AVS? [employer]  For the video visit, send the invitation by: Text to cell phone: [phone]   Will anyone else be joining your video visit? No        Video start 2:50 pm  End 3:10 pm  [facility]                                                                               - Endocrinology Follow up -    Reason for visit/consult:  acromegaly    Primary care provider: No Ref-Primary, Physician    Assessment and Plan  A 42 year old male with acromegaly s/p TSS twice, RT, long term panhypopituitarism    # Acromegaly  Post TSS twice, last MRI 2/2016 post surgical changes.  Pituitary MRI was done in December 2018 no residual tumor.  We will do next follow-up MRI end of this year 2021 (ordered)    - IGF1 level to check (current run out GH therapy for 2 weeks)     # Secondary adrenal insufficiency  Since patient feels well , he prefers to stay on stay Dexamethasone 0.5 mg daily, rather than switching to hydrocortisone.  A1C 5.1 good.     - continue Dexamethasone 0.5 mg daily    - Sol-cotef PRN as usual      # Secondary hypothryoidism  Clinically euthyroid  -Continue current dose of levothyroxine 125 mcg daily.    # Secondary hypogonadism  Last lab showed elevated tesotsreone level to 1400 (5/2021) therefore we reduced dose, current 0.3 ml every week (60 mg)     - recheck total testosterone level   - PSA  - CBC        # GH deficiecy  Acromegaly, post TSS and RT, no residual.was started at [facility] for GH deficiency given no recurrence and colon cancer no recurrence.  Controversial but with this helping his quality of life and no active contraindicated issue on going, therefore I agreed to continue.    IGF1 previous was normal range with GH injection.  good range, not overdosed.     - will proceed insurance process for appeal    -Continue current dose of growth hormone 0.5 mg subcu daily.     # history of colon cancer  This  year he will go to colonoscopy    # dyslipidemia  Previously elevated LDL  - repeat again lipid panel      - RTC with me in 1 year.       35 minutes spent on the date of the encounter doing chart review, history and exam, documentation and further activities as noted above.    Damaris Tejada MD  Staff Physician  Endocrinology and Metabolism  [facility]  License: [id number]  Pager: [phone]    Interval History as of 6/17/2022 : Patient has been doing well.  Medication compliance  Excellelnt, but run out GH due to insurance . New event includes : no pertinent medical event noted .  Interval History as of 3/10/2021 : Patient has been doing well. Ran out GH for 2-3 weeks. Still on process of approval by new insurance. Otherwise doing well and stable.   Interval History as of 3/11/2020 : Patient has been doing well. No major medical event. Considering family planning, that he tried before, we will back to HCG therapy (previsously [facility] in 2017 he was on HCG 1500 every other day)  Interval history ; patient has been doing well, however felt a little bit weak since he is off growth hormone injection.  Therefore he was about to resume growth hormone again.  Also he has been missing testosterone injection mentioned some issue his insurance company in the pharmacy?  Otherwise has been stable taking dexamethasone 0.5 mg for adrenal insufficiency.  MRI negative.  HPI: A 39 yo male with acromegaly, here for the care transition due to interstate move.  He moved in 7/2018 from [place], originally from [place].    He was diagnosed Acromegaly in 2007, during the regular physical check up in [facility], and screened and diagnosed. Patient recalls that his pituitary tumor was 1 inch or so.   In 12/2009 first surgery was done at [facility], RT was done after that in [place] 6/2010.   2/2011 he underwent colonoscopy for Colon cancer screening, and results positive for malignancy, then underwent sigmoid  "resection.     Patient moved to [place], residual tumor detected. his second surgery 12/2015 at [facility] for residual tumor.     Patient has been taking Dexamethasone, testosterone, GH for his panhypopituitarism started since 2010. He used to be on Hydrocortisone, however tends to forgets afternoon dose, then switched to dexamethasone 0.5 mg in [place] due to missing dose.   Levothyroxine 125 mcg. Testosterone injection every week 100 mg.   Growth hormone (neutropin) 0.5 mg daily since 2017.   [facility] since he was missing dexamethasone for busy baby care, he went to [facility] for sickness.     Last visit 3/20/2018 [facility] Dr. Parada visit for GHD.     4/2017 colonoscoy: OK,     Past Medical/Surgical History:  Past Medical History:   Diagnosis Date     Asymptomatic LV dysfunction 08/30/2012     Cancer (H)     Colon     Depressive disorder 2011, 2021     Thyroid disease     Acromegaly     Past Surgical History:   Procedure Laterality Date     APPENDECTOMY       COLONOSCOPY       GI SURGERY  2011    sigmoid colectomy     HEAD & NECK SURGERY  2010    transphenoidal hypophysectomy, repeat 2016       Allergies:  No Known Allergies    Current Medications   Current Outpatient Medications   Medication     dexamethasone (DECADRON) 1 MG tablet     dexamethasone (DECADRON) 1 MG tablet     insulin pen needle (PENTIPS) 31G X 8 MM miscellaneous     levothyroxine (SYNTHROID/LEVOTHROID) 125 MCG tablet     sertraline (ZOLOFT) 25 MG tablet     Sildenafil Citrate (VIAGRA PO)     Somatropin (NORDITROPIN FLEXPRO) 5 MG/1.5ML SOPN     Somatropin (NUTROPIN AQ NUSPIN 5) 5 MG/2ML SOPN     syringe/needle, disp, 23G X 1\" 3 ML MISC     syringe/needle, disp, 23G X 1\" 3 ML MISC     testosterone cypionate (DEPOTESTOSTERONE) 200 MG/ML injection     hydrocortisone sodium succinate PF (SOLU-CORTEF) 100 MG injection     No current facility-administered medications for this visit.       Family History:  Family History   Problem " Relation Age of Onset     Rheumatoid Arthritis Mother      Other Cancer Mother         Nose/skin cancer     Colon Cancer Maternal Grandmother      Cerebrovascular Disease Paternal Grandmother      Coronary Artery Disease Paternal Grandfather      Rheumatoid Arthritis Other      Diabetes Other      Hypertension No family hx of      Hyperlipidemia No family hx of      Breast Cancer No family hx of      Prostate Cancer No family hx of      Depression No family hx of      Anxiety Disorder No family hx of      Mental Illness No family hx of      Substance Abuse No family hx of      Anesthesia Reaction No family hx of      Asthma No family hx of      Osteoporosis No family hx of      Genetic Disorder No family hx of      Thyroid Disease No family hx of      Obesity No family hx of      Unknown/Adopted No family hx of    Maternal uncle: some acromegalic feature    Social History:  Social History     Tobacco Use     Smoking status: Never Smoker     Smokeless tobacco: Never Used   Substance Use Topics     Alcohol use: Yes     Comment: Only so often       ROS:  Full review of systems taken with the help of the intake sheet. Otherwise a complete 14 point review of systems was taken and is negative unless stated in the history above.      Physical Exam:   There were no vitals taken for this visit.  General: well appearing, no acute distress, pleasant and conversant,   Mental Status/neuro: alert and oriented  Face: symmetrical, normal facial color  Eyes: anicteric, no proptosis or lid lag  Resp: normally breathing      Labs : I reviewed data from epic and extract and summarize the pertinent data here.               1/2018  TSH   0.02  Cortisol 5.1       2/19/2020 09:28   Sodium 138   Potassium 3.4   Chloride 104   Carbon Dioxide 29   Urea Nitrogen 8   Creatinine 0.87   GFR Estimate >90   GFR Estimate If Black >90   Calcium 8.5   Anion Gap 5   Albumin 3.7   Protein Total 7.1   Bilirubin Total 0.6   Alkaline Phosphatase 52   ALT  20   AST 19   Hemoglobin A1C 5.1   Cholesterol 238 (H)   FSH 1.0   HDL Cholesterol 77   LDL Cholesterol Calculated 140 (H)   Non HDL Cholesterol 161 (H)   T4 Free 1.27   Testosterone Total 972 (H)   Triglycerides 104   TSH 0.03 (L)   Glucose 84   Ins Growth Factor 1 120   Lutropin 0.2 (L)     MRI Brain: I personally reviewed the original images and agree with the below reports.   Brain/ Pituitary MRI without and with contrast     History: Acromegaly (H). 38 year old male with?acromegaly s/p TSS  twice, RT, long term panhypopituitarism. Post TSS  last MRI 2/2016  post surgical changes (no prior images available.)     ICD-10: Acromegaly (H)     Comparison:  None      Technique: Axial diffusion and FLAIR images of the whole brain  obtained without intravenous contrast. Sagittal T1 and T2-weighted,  coronal T2-weighted, coronal T1-weighted images with focus on the  sella were obtained without intravenous contrast. Post intravenous  contrast using gadolinium coronal and sagittal T1-weighted images were  obtained focused on the sella. Dynamic postcontrast coronal  T1-weighted images were also obtained.     Contrast: 7.5 mL Gadavist     Findings:       Postsurgical changes of transsphenoidal surgery. There are 2 foci of  T1 hyperintensity in the sella turcica without evidence of enhancement  (series 100, image 11). Few scattered punctate white matter T2  hyperintense foci.     No mass is demonstrated within the sella. The pituitary stalk appears  midline. The optic chiasm appears intact and not displaced.  The major cavernous carotid vascular flow-voids appear patent.       FLAIR images through the whole brain are unremarkable, and demonstrate  no mass effect, midline shift, or significant enlargement of the  ventricles. Small mucous retention cysts in the left and right  maxillary sinuses.                                                                      Impression:  1. Postsurgical changes of transsphenoidal  resection. No evidence of  recurrence.  2. Few scattered punctate white matter T2 hyperintense foci,  nonspecific.

## 2022-06-20 NOTE — TELEPHONE ENCOUNTER
Patient had an appointment 6/17/22, however labs haven't been updated since March 2021. Sent note to clinic to have those completed before Pa renewal can be done.

## 2022-06-21 NOTE — TELEPHONE ENCOUNTER
Rescheduled procedure.     Attempted to contact patient regarding upcoming colonoscopy procedure on 6/30/22 for pre assessment questions. No answer.     Left message to return call to [phone] #[phone]    Covid test scheduled 6/26/22. Discuss at home rapid antigen COVID test 1-2 days prior to procedure.    Arrival time: 0650    Facility location: [facility]     Sedation type: MAC     Bowel prep recommendation: Miralax/Magnesium citrate/Dulcolax     Zehra Bansal RN

## 2022-06-26 DIAGNOSIS — E29.1 HYPOGONADISM MALE: ICD-10-CM

## 2022-06-26 DIAGNOSIS — E23.0 HYPOPITUITARISM (H): ICD-10-CM

## 2022-06-27 ENCOUNTER — TELEPHONE (OUTPATIENT)
Dept: GASTROENTEROLOGY | Facility: CLINIC | Age: 42
End: 2022-06-27

## 2022-06-27 ENCOUNTER — HOSPITAL ENCOUNTER (OUTPATIENT)
Facility: AMBULATORY SURGERY CENTER | Age: 42
End: 2022-06-27
Attending: INTERNAL MEDICINE
Payer: COMMERCIAL

## 2022-06-27 ENCOUNTER — TELEPHONE (OUTPATIENT)
Dept: ENDOCRINOLOGY | Facility: CLINIC | Age: 42
End: 2022-06-27

## 2022-06-27 DIAGNOSIS — E23.0 GROWTH HORMONE DEFICIENCY (H): Primary | ICD-10-CM

## 2022-06-27 DIAGNOSIS — D35.2 PITUITARY ADENOMA (H): ICD-10-CM

## 2022-06-27 DIAGNOSIS — E23.0 HYPOPITUITARISM (H): ICD-10-CM

## 2022-06-27 NOTE — TELEPHONE ENCOUNTER
Called the Pt to discuss below. The Pt needs to reschedule the procedure. Transferred to scheduling.     Lexi Crooks RN   -[facility]

## 2022-06-27 NOTE — TELEPHONE ENCOUNTER
PA Initiation    Medication: NUTROPIN-PENDING  Insurance Company: [employer] ([facility]) - Phone [phone] Fax [phone]  Pharmacy Filling the Rx: [facility] - [place], [place] - [facility] [address]  Filling Pharmacy Phone: [phone]  Filling Pharmacy Fax: [phone]  Start Date: 6/27/2022

## 2022-06-27 NOTE — TELEPHONE ENCOUNTER
Caller: Ivan D Welander      Procedure: Colon    Date, Location, and Surgeon of Procedure Cancelled: 6/30    Ordering Provider: Roberth    Reason for cancel (please be detailed, any staff messages or encounters to note?): Needed to change the date        Rescheduled: Y     If rescheduled:    Date: 9/22   [place] [facility]   Prep Resent: Yes (changes to prep?)   Covid Test Rescheduled: No, home rapid test   Note any change or update to original order/sedation: Y

## 2022-06-29 RX ORDER — NEEDLES, SAFETY 22GX1 1/2"
NEEDLE, DISPOSABLE MISCELLANEOUS
Qty: 15 EACH | Refills: 3 | Status: SHIPPED | OUTPATIENT
Start: 2022-06-29 | End: 2023-04-03

## 2022-06-29 RX ORDER — TESTOSTERONE CYPIONATE 200 MG/ML
100 INJECTION, SOLUTION INTRAMUSCULAR WEEKLY
Qty: 6 ML | Refills: 5 | Status: SHIPPED | OUTPATIENT
Start: 2022-06-29 | End: 2022-08-10

## 2022-06-29 RX ORDER — LEVOTHYROXINE SODIUM 125 UG/1
125 TABLET ORAL DAILY
Qty: 90 TABLET | Refills: 5 | Status: SHIPPED | OUTPATIENT
Start: 2022-06-29 | End: 2023-07-31

## 2022-06-29 RX ORDER — DEXAMETHASONE 1 MG
TABLET ORAL
Qty: 105 TABLET | Refills: 3 | Status: SHIPPED | OUTPATIENT
Start: 2022-06-29 | End: 2024-01-17

## 2022-06-29 NOTE — TELEPHONE ENCOUNTER
LEVOTHYROXINE 0.125MG (125MCG) TAB     Last Written Prescription Date:  6/14/21  Last Fill Quantity: 90,   # refills: 3  Last Office Visit : 6/17/22  Future Office visit:  2/1/23    Routing refill request to provider for review/approval because:  Overdue TSH  Orders are in EPIC    DEXAMETHASONE 1MG TABLETS     Last Written Prescription Date:  6/14/21  Last Fill Quantity: 105,   # refills: 3    Routing refill request to provider for review/approval because:  Drug not on the Endocrine refill protocol   Thank-you, January NASH RN Medication Refill Team

## 2022-07-05 NOTE — TELEPHONE ENCOUNTER
Nutropin AQ NuSpin 10 10MG/2ML pen-injectors was approved when submitted in error      [however the NuSpin 5 5MG/2ML pen-injectors which is what is written on the rx was denied

## 2022-07-13 ENCOUNTER — TELEPHONE (OUTPATIENT)
Dept: ENDOCRINOLOGY | Facility: CLINIC | Age: 42
End: 2022-07-13

## 2022-07-13 NOTE — TELEPHONE ENCOUNTER
[facility] Call Center    Phone Message    May a detailed message be left on voicemail: yes     Reason for Call: Medication Question or concern regarding medication   Prescription Clarification  Name of Medication:Somatropin (NUTROPIN AQ NUSPIN 5) 5 MG/2ML SOPN  Prescribing Provider: Terrell     Pharmacy: [facility] - [place], [place] - [address]   What on the order needs clarification? Pharmacy called and stated they need to know what to do regarding medication.  Please call back @ [phone]          Action Taken: Message routed to:  [facility] endo    Travel Screening: Not Applicable

## 2022-07-14 NOTE — TELEPHONE ENCOUNTER
Spoke w/ Pharm D Wyn  GH not covered. PA on nutropin.   America Gold RN on 7/14/2022 at 10:10 AM       Pharm Team notified to please clarify on meaning:     Dalia Mercado    3:08 PM  Note  Nutropin AQ NuSpin 10 10MG/2ML pen-injectors was approved when submitted in error

## 2022-07-21 DIAGNOSIS — E23.0 GROWTH HORMONE DEFICIENCY (H): ICD-10-CM

## 2022-07-21 DIAGNOSIS — E23.0 HYPOPITUITARISM (H): ICD-10-CM

## 2022-07-24 NOTE — TELEPHONE ENCOUNTER
NUTROPIN AQ NUSPIN 5 5MG/2ML SOPN      Last Written Prescription Date:  1/18/22  Last Fill Quantity: 6 ml,   # refills: 2  Last Office Visit : 6/17/22 recommended 1 year follow up  Future Office visit:  None scheduled    Routing refill request to provider for review/approval because:  Drug controlled substance

## 2022-07-25 ENCOUNTER — LAB (OUTPATIENT)
Dept: LAB | Facility: CLINIC | Age: 42
End: 2022-07-25
Payer: COMMERCIAL

## 2022-07-25 DIAGNOSIS — E23.0 HYPOPITUITARISM (H): ICD-10-CM

## 2022-07-25 DIAGNOSIS — E78.5 DYSLIPIDEMIA: ICD-10-CM

## 2022-07-25 DIAGNOSIS — E23.0 GROWTH HORMONE DEFICIENCY (H): ICD-10-CM

## 2022-07-25 DIAGNOSIS — E22.0 ACROMEGALY (H): ICD-10-CM

## 2022-07-25 LAB
CHOLEST SERPL-MCNC: 260 MG/DL
ERYTHROCYTE [DISTWIDTH] IN BLOOD BY AUTOMATED COUNT: 13.9 % (ref 10–15)
FASTING STATUS PATIENT QL REPORTED: YES
HCT VFR BLD AUTO: 38.2 % (ref 40–53)
HDLC SERPL-MCNC: 81 MG/DL
HGB BLD-MCNC: 12.9 G/DL (ref 13.3–17.7)
LDLC SERPL CALC-MCNC: 162 MG/DL
MCH RBC QN AUTO: 28.4 PG (ref 26.5–33)
MCHC RBC AUTO-ENTMCNC: 33.8 G/DL (ref 31.5–36.5)
MCV RBC AUTO: 84 FL (ref 78–100)
NONHDLC SERPL-MCNC: 179 MG/DL
PLATELET # BLD AUTO: 195 10E3/UL (ref 150–450)
PSA SERPL-MCNC: 0.47 UG/L (ref 0–4)
RBC # BLD AUTO: 4.55 10E6/UL (ref 4.4–5.9)
T4 FREE SERPL-MCNC: 1.05 NG/DL (ref 0.76–1.46)
TRIGL SERPL-MCNC: 87 MG/DL
TSH SERPL DL<=0.005 MIU/L-ACNC: 0.12 MU/L (ref 0.4–4)
WBC # BLD AUTO: 6 10E3/UL (ref 4–11)

## 2022-07-25 PROCEDURE — 84439 ASSAY OF FREE THYROXINE: CPT

## 2022-07-25 PROCEDURE — 84305 ASSAY OF SOMATOMEDIN: CPT

## 2022-07-25 PROCEDURE — 85027 COMPLETE CBC AUTOMATED: CPT

## 2022-07-25 PROCEDURE — 84153 ASSAY OF PSA TOTAL: CPT

## 2022-07-25 PROCEDURE — 84443 ASSAY THYROID STIM HORMONE: CPT

## 2022-07-25 PROCEDURE — 36415 COLL VENOUS BLD VENIPUNCTURE: CPT

## 2022-07-25 PROCEDURE — 80061 LIPID PANEL: CPT

## 2022-07-25 PROCEDURE — 84403 ASSAY OF TOTAL TESTOSTERONE: CPT

## 2022-07-25 RX ORDER — SOMATROPIN 5 MG/2ML
0.5 INJECTION, SOLUTION SUBCUTANEOUS DAILY
Qty: 6 ML | Refills: 2 | OUTPATIENT
Start: 2022-07-25 | End: 2022-07-25

## 2022-07-25 RX ORDER — SOMATROPIN 5 MG/2ML
0.5 INJECTION, SOLUTION SUBCUTANEOUS DAILY
Qty: 6 ML | Refills: 2 | Status: SHIPPED | OUTPATIENT
Start: 2022-07-25

## 2022-07-25 NOTE — TELEPHONE ENCOUNTER
America Gold RN Araki, Takako, MD 7 days ago     NINA Tejada, please let us know if the 10mg would work. Thank you.     Message text        America Gold RN Araki, Takako, MD 10 days ago     DM    Eddie Tejada - can we rewrite the RX for the 10mg - and Pt can administer correct dose from that pen since the PA is in place or would you prefer to resubmit the PA?     Message text        You  America Gold RN 11 days ago     MELODY Cross,   It looks like the [facility] pa team did the 10mg injection in error. I am not sure why they haven't done the 5mg injection yet.   I will redo the pa for the 5mg injections OR if the provider is ok with it, since the pa was done on accident for the 10mg, the prescription could be changed to the nutropin 10mg pen since his dose is only 0.1ml per day, and nutropin can dose that on the 10mg pen.

## 2022-07-26 LAB — IGF-I BLD-MCNC: 53 NG/ML (ref 80–235)

## 2022-07-27 ENCOUNTER — MYC MEDICAL ADVICE (OUTPATIENT)
Dept: ENDOCRINOLOGY | Facility: CLINIC | Age: 42
End: 2022-07-27

## 2022-07-27 DIAGNOSIS — E23.0 GROWTH HORMONE DEFICIENCY (H): Primary | ICD-10-CM

## 2022-07-27 LAB — TESTOST SERPL-MCNC: 1176 NG/DL (ref 240–950)

## 2022-08-11 NOTE — TELEPHONE ENCOUNTER
As per pharmacy liaison team:    So the team at  (not me) did the wrong strength on the PA. I had reached out to the clinic multiple times to see if we can change to what the pa is done for :     See encounter from 7/13 medication question for more information.   Clarifying if pt can change to the 10mg pen injectors due to pa done for incorrect strength. If provider wants the 5mg injection pens, will have to appeal decision for 5mg, since the 10mg was approved.   Received 5mg refill order 7/25/22. Sent clinic message 7/25 to clarify     If they want it to be the 5mg, we need to appeal the denial that I received.            Dr. Tejada sent in prescription for the 10mg/2ml pen. Patient updated. This pen dosing has been approved. [facility] should be reaching out to patient. Writer called patient and updated him on situation and explained new pen will last longer than 5 mg pen. Patient verbalizes understanding. Writer did ask patient to send MyChart follow-up when he has got the medication and/or if needs further assistance. Patient verbalizes understanding and agrees to plan.           Latrice Beckham, RN  Endocrine Care Coordinator  [facility]

## 2022-08-11 NOTE — TELEPHONE ENCOUNTER
Romy Henderson Deanne M, RN  Rx sent to , seemed to work just fine.   They have it typed up, they just need it to get added to the copay card he has.

## 2022-09-14 ENCOUNTER — TELEPHONE (OUTPATIENT)
Dept: GASTROENTEROLOGY | Facility: CLINIC | Age: 42
End: 2022-09-14

## 2022-09-14 DIAGNOSIS — Z86.0100 HISTORY OF COLONIC POLYPS: Primary | ICD-10-CM

## 2022-09-14 RX ORDER — BISACODYL 5 MG/1
TABLET, DELAYED RELEASE ORAL
Qty: 4 TABLET | Refills: 0 | Status: SHIPPED | OUTPATIENT
Start: 2022-09-14 | End: 2023-03-03

## 2022-09-14 NOTE — TELEPHONE ENCOUNTER
Rescheduled procedure     Attempted to contact patient regarding upcoming colonoscopy procedure on 9/22/22 for pre assessment questions. No answer.     Left message to return call to [phone] #[phone]    Covid test scheduled? No Discuss at home rapid antigen COVID test 1-2 days prior to procedure.    Arrival time: 0625    Facility location: [facility]    Sedation type: MAC     Indication for procedure: hx of polyps     Anticoagulants: no.     Bowel prep recommendation: Golytely d/t mg citrate recall    Golytely  script sent to [id number] - [place], [place] - [address] AT [facility] [address]. Prep instructions sent via [employer].    Zehra Bansal RN

## 2022-09-19 NOTE — TELEPHONE ENCOUNTER
2nd attempt    Attempted to contact patient regarding upcoming colonoscopy procedure on 9.22.22 for pre assessment questions. No answer.     Left message to return call to [phone] #[phone]    [employer] message sent.      Caitlin Tilley RN

## 2022-09-21 ENCOUNTER — TELEPHONE (OUTPATIENT)
Dept: GASTROENTEROLOGY | Facility: CLINIC | Age: 42
End: 2022-09-21

## 2022-09-21 NOTE — TELEPHONE ENCOUNTER
Caller: Ivan D Welander      Procedure: COLON    Date, Location, and Surgeon of Procedure Cancelled: 9/22/22, [facility]    Ordering Provider:PEG    Reason for cancel (please be detailed, any staff messages or encounters to note?): PT REQUEST        Rescheduled: Y     If rescheduled:    Date: 1/9   [place] [facility]   Prep Resent: GPREP(changes to prep?Y, D/T MAG CIT RECALL)   Covid Test Rescheduled: HOME   Note any change or update to original order/sedation:          UPDATED SCREENING BELOW:          Screening Questions  BLUE  KIND OF PREP RED  LOCATION [review exclusion criteria] GREEN  SEDATION TYPE        Y Are you active on mychart?       PEG Ordering/Referring Provider?        [facility] What type of coverage do you have?      N Have you had a positive covid test in the last 90 days?        Date:    1. 26.4  BMI  [BMI 40+ - review exclusion criteria]  2. Y  Are you able to give consent for your medical care? [RN REVIEW?]        3. N  Are you taking any prescription pain medications on a routine schedule?        3a.  EXTENDED PREP What kind of prescription?   4. N Do you have any chemical dependencies such as alcohol, street drugs, or methadone?    5. N Do you have any history of post-traumatic stress syndrome, severe anxiety or history of psychosis?      **If yes 2- 5 , please schedule with MAC sedation.**    BMI OVER 40 NEED PAC EVALUATION FOR [facility]          IF YES TO ANY 6 - 10 - HOSPITAL SETTING ONLY.     6.   N Do you need assistance transferring?     7.   N Have you had a heart or lung transplant?    8.   N Are you currently on dialysis?   9.   N Do you use daily home oxygen?   10. N Do you take nitroglycerin?   10a.  If yes, how often?     11. [FEMALES]   Are you currently pregnant?    11a.  If yes, how many weeks? [ Greater than 12 weeks, OR NEEDED]    12. N Do you have Pulmonary Hypertension? *NEED PAC APPT AT [facility]*     13. N [review exclusion criteria]  Do you have any implantable devices in your body  "(pacemaker, defib, LVAD)?    14. N In the past 6 months, have you had any heart related issues including cardiomyopathy or heart attack?     14a.  If yes, did it require cardiac stenting if so when?     15. N Have you had a stroke or Transient ischemic attack (TIA - aka  mini stroke ) within 6 months?      16. N Do you have mod to severe Obstructive Sleep Apnea?  [Hospital only - Ok at [place]]    17. N Do you have SEVERE AND UNCONTROLLED asthma?     18. N Are you currently taking any blood thinners?     18a. If yes, inform patient to \"follow up w/ ordering provider for bridging instructions.\"    19. N Do you take the medication Phentermine?    19a. If yes, \"Hold for 7 days before procedure.  Please consult your prescribing provider if you have questions about holding this medication.\"     20. N  Do you have chronic kidney disease?      21. N  Do you have a diagnosis of diabetes?     22. N  On a regular basis do you go 3-5 days between bowel movements?     23.  Preferred LOCAL Pharmacy for Pre Prescription    [ LIST ONLY ONE PHARMACY]      [id number] - [place], [place] - [address] AT [facility] [address]          - CLOSING REMINDERS -    Informed patient they will need an adult    Cannot take any type of public or medical transportation alone    Conscious Sedation- Needs  for 6 hours after the procedure       MAC/General-Needs  for 24 hours after procedure    Pre-Procedure Covid test to be completed [[facility] PCR Testing Required]    Confirmed Nurse will call to complete pre-assessment       - SCHEDULING DETAILS -     PEG  Surgeon   1/9 Date of Procedure  Lower Endoscopy [Colonoscopy]  Type of Procedure Scheduled   [facility] PREP-If you answer yes to questions #8, #20, #21Which Colonoscopy Prep was Sent?     MAC per order Sedation Type     N PAC / Pre-op Required         Additional comments:        "

## 2022-09-21 NOTE — TELEPHONE ENCOUNTER
Attempted to contact patient regarding upcoming colonoscopy procedure on 9.22.2022 for pre assessment questions. No answer.     Patient stated they need to r/s.  Transferred to endo scheduling.    Sarah Starr RN

## 2022-11-01 ENCOUNTER — TELEPHONE (OUTPATIENT)
Dept: ENDOCRINOLOGY | Facility: CLINIC | Age: 42
End: 2022-11-01

## 2022-11-20 ENCOUNTER — HEALTH MAINTENANCE LETTER (OUTPATIENT)
Age: 42
End: 2022-11-20

## 2022-12-22 NOTE — TELEPHONE ENCOUNTER
Pre assessment questions completed for upcoming colonoscopy  procedure scheduled on 1/9/23    COVID policy reviewed.     Pre-op scheduled  N/A    Reviewed procedural arrival time 0915 and facility location [facility]; [address] [place] [address], [address], [place] [address]    Designated  policy reviewed. Instructed to have someone stay 24 hours post procedure.     Anticoagulation/blood thinners? No    Electronic implanted devices? No    Diabetic? No    Procedure indication: hx of polyps    Bowel prep recommendation: Standard Golytely     Reviewed procedure prep instructions.     Prep instructions sent via [employer].  Bowel prep script sent to      [id number] - [place], [place] - [address] AT [facility] [address]    Patient verbalized understanding and had no questions or concerns at this time.    SILVINA RICHARDS RN

## 2022-12-26 NOTE — TELEPHONE ENCOUNTER
Per ERX note from patient  at [place] 08/15/2022    PA APPROVAL*PFA  (NUTROPIN), <[id number]>, IS APPROVED BY ([facility] [employer]) EFFECTIVE (6/22/22) THROUGH (6/22/23), PA#: [id number]. TEST CLAIM=$5.00. CHANGING DELIVERY METHOD TO SET UP DELIVERY.

## 2023-01-04 ENCOUNTER — TELEPHONE (OUTPATIENT)
Dept: GASTROENTEROLOGY | Facility: CLINIC | Age: 43
End: 2023-01-04

## 2023-01-04 NOTE — TELEPHONE ENCOUNTER
Caller: Alex  Reason for Reschedule/Cancellation (please be detailed, any staff messages or encounters to note?): Patient      Prior to reschedule please review:    Ordering Provider:Roberth    Sedation per order:MAC    Does patient have any ASC Exclusions, please identify?: N      Notes on Cancelled Procedure:    Procedure:Lower Endoscopy [Colonoscopy]     Date: 1/9/23    Location:[facility]; [address] [place] [address], [place] [address]    Surgeon: Roberth        Rescheduled: Yes    Procedure: Lower Endoscopy [Colonoscopy]     Date: 4/24/23    Location:[facility] [place]; [address] [place] [address], [place] [id number]    Surgeon: Roberth    Sedation Level Scheduled  MAC,  Reason for Sedation Level Per order    Prep/Instructions updated and sent: Yes

## 2023-01-05 DIAGNOSIS — E23.0 HYPOPITUITARISM (H): ICD-10-CM

## 2023-01-05 DIAGNOSIS — E23.0 GROWTH HORMONE DEFICIENCY (H): ICD-10-CM

## 2023-01-06 RX ORDER — PEN NEEDLE, DIABETIC 31 GX5/16"
NEEDLE, DISPOSABLE MISCELLANEOUS
Qty: 100 EACH | Refills: 1 | Status: SHIPPED | OUTPATIENT
Start: 2023-01-06

## 2023-01-06 NOTE — TELEPHONE ENCOUNTER
insulin pen needle (PENTIPS) 31G X 8 MM miscellaneous    Diabetic Supplies Protocol Passed   /17/2022  [facility] Damaris Domingo MD  Endocrinology, Diabetes, and Metabolism

## 2023-01-10 ENCOUNTER — TELEPHONE (OUTPATIENT)
Dept: ENDOCRINOLOGY | Facility: CLINIC | Age: 43
End: 2023-01-10

## 2023-01-10 NOTE — TELEPHONE ENCOUNTER
----- Message from Damaris Tejada MD sent at 1/9/2023  7:08 PM CST -----  Regarding: FW: Late To Fill - Nutropin  He is supposed to be on GH still. What action should we take? If that still appeal process of GH injection??    Damaris  ----- Message -----  From: Yodit Sexton  Sent: 1/4/2023  10:48 AM CST  To: Damaris Tejada MD  Subject: Late To Fill - Nutropin                          Hello,    [facility] has been unable to reach this patient to refill their medication.  According to our records, the patient is overdue to refill and adherence may be an issue at this point.    Medication and strength: Nutropin AQ Nuspin 10 10 SOPN  Last fill date and day supply: Sent 1mo supply on 11/17/2022    If the medication is on hold, been changed, discontinued, sent to a different pharmacy, or any other information is available, please reply or contact us at [phone].    Thank you,    [facility]

## 2023-01-10 NOTE — TELEPHONE ENCOUNTER
He is still taking the growth hormone but misses doses so refills are not needed  As if he took it daily. He did reach out to the pharmacy and is receiving a shipment Karma Estrada RN on 1/10/2023 at 1:21 PM

## 2023-02-01 ENCOUNTER — VIRTUAL VISIT (OUTPATIENT)
Dept: ENDOCRINOLOGY | Facility: CLINIC | Age: 43
End: 2023-02-01
Payer: COMMERCIAL

## 2023-02-01 DIAGNOSIS — E23.0 PANHYPOPITUITARISM (H): ICD-10-CM

## 2023-02-01 DIAGNOSIS — E22.0 ACROMEGALY (H): Primary | ICD-10-CM

## 2023-02-01 PROCEDURE — 99214 OFFICE O/P EST MOD 30 MIN: CPT | Mod: GT | Performed by: INTERNAL MEDICINE

## 2023-02-01 NOTE — NURSING NOTE
Patient denies any changes since echeck-in regarding medication and allergies and states all information entered during echeck-in remains accurate.    Maty Min MA

## 2023-02-01 NOTE — PROGRESS NOTES
Ivan D Welander  is being evaluated via a billable video visit.      How would you like to obtain your AVS? [employer]  For the video visit, send the invitation by: Text to cell phone: [phone]  Will anyone else be joining your video visit? No         Video start 12:30 pm  End 1:00 pm  Amwell                                                                               - Endocrinology Follow up -    Reason for visit/consult:  Acromegaly, panhypopituitarism    Primary care provider: No Ref-Primary, Physician    Assessment and Plan  A 42 year old male with acromegaly s/p TSS twice, RT, long term panhypopituitarism    # Acromegaly  Post TSS twice, last MRI 2/2016 post surgical changes.  Pituitary MRI was done in December 2018 no residual tumor.      # GH deficiency  Acromegaly, post TSS and RT, no residual.was started at [facility] for GH deficiency given no recurrence and colon cancer no recurrence.    Currently on GH 0.5 mg injection but tends to forget recently    - IGF1    # Secondary adrenal insufficiency  Since patient feels well , he prefers to stay on stay Dexamethasone 0.5 mg daily, rather than switching to hydrocortisone.  A1C 5.1 good.     - continue Dexamethasone 0.5 mg daily    - Sol-cotef if needs      # Secondary hypothryoidism  Free T4 good range   -Continue current dose of levothyroxine 125 mcg daily.    # Secondary hypogonadism  Currently long standing testosterone injection 100 mg every week   Considering family planning, that he tried before, we will back to HCG therapy (previsously [facility] in 2017 he was on HCG 1500 every other day)    - currently using testosterone im 120 mg weekly, he may switch to xyosted 75 g weekly, if it is so, we will check the level 1 month after the switch.       # history of colon cancer  Stable.  - he has colonoscopy 2023 planned        - RTC with me in 1 year.       Damaris Tejada MD  Staff Physician  Endocrinology and Metabolism  License: [id number]    Interval History as of  2/1/2023 : Patient has been doing well. Medication compliance tends to forget GH injection . New event includes: no pertinent medical event noted  .  Interval History as of 3/10/2021 : Patient has been doing well. Ran out GH for 2-3 weeks. Still on process of approval by new insurance. Otherwise doing well and stable.   Interval History as of 3/11/2020 : Patient has been doing well. No major medical event. Considering family planning, that he tried before, we will back to HCG therapy (previsously [facility] in 2017 he was on HCG 1500 every other day)  Interval history ; patient has been doing well, however felt a little bit weak since he is off growth hormone injection.  Therefore he was about to resume growth hormone again.  Also he has been missing testosterone injection mentioned some issue his insurance company in the pharmacy?  Otherwise has been stable taking dexamethasone 0.5 mg for adrenal insufficiency.  MRI negative.  HPI: A 37 yo male with acromegaly, here for the care transition due to interstate move.  He moved in 7/2018 from [place], originally from [place].    He was diagnosed Acromegaly in 2007, during the regular physical check up in [facility], and screened and diagnosed. Patient recalls that his pituitary tumor was 1 inch or so.   In 12/2009 first surgery was done at [facility], RT was done after that in [place] 6/2010.   2/2011 he underwent colonoscopy for Colon cancer screening, and results positive for malignancy, then underwent sigmoid resection.     Patient moved to [place], residual tumor detected. his second surgery 12/2015 at [facility] for residual tumor.     Patient has been taking Dexamethasone, testosterone, GH for his panhypopituitarism started since 2010. He used to be on Hydrocortisone, however tends to forgets afternoon dose, then switched to dexamethasone 0.5 mg in [place] due to missing dose.   Levothyroxine 125 mcg. Testosterone injection every week 100 mg.   Growth hormone  "(neutropin) 0.5 mg daily since 2017.   In [place] 2018, since he was missing dexamethasone for busy baby care, he went to [facility] for sickness.     Last visit 3/20/2018 [facility] Neurosurgery Dr. Parada visit for GHD.     4/2017 colonoscoy: OK,     Past Medical/Surgical History:  Past Medical History:   Diagnosis Date     Asymptomatic LV dysfunction 08/30/2012     Cancer (H)     Colon     Depressive disorder 2011, 2021     Thyroid disease     Acromegaly     Past Surgical History:   Procedure Laterality Date     APPENDECTOMY       COLONOSCOPY       GI SURGERY  2011    sigmoid colectomy     HEAD & NECK SURGERY  2010    transphenoidal hypophysectomy, repeat 2016       Allergies:  No Known Allergies    Current Medications   Current Outpatient Medications   Medication     bisacodyl (DULCOLAX) 5 MG EC tablet     dexamethasone (DECADRON) 1 MG tablet     dexamethasone (DECADRON) 1 MG tablet     insulin pen needle (PENTIPS) 31G X 8 MM miscellaneous     levothyroxine (SYNTHROID/LEVOTHROID) 125 MCG tablet     polyethylene glycol (GOLYTELY) 236 g suspension     sertraline (ZOLOFT) 25 MG tablet     sildenafil (VIAGRA) 50 MG tablet     Somatropin (NORDITROPIN FLEXPRO) 5 MG/1.5ML SOPN     somatropin (NUTROPIN AQ 10) 10 MG/2ML SOPN PEN injection     Somatropin (NUTROPIN AQ NUSPIN 5) 5 MG/2ML SOPN     syringe/needle, disp, (B-D SYRINGE/NEEDLE) 23G X 1\" 3 ML MISC     syringe/needle, disp, 23G X 1\" 3 ML MISC     Testosterone Enanthate (XYOSTED) 75 MG/0.5ML SOAJ     hydrocortisone sodium succinate PF (SOLU-CORTEF) 100 MG injection     No current facility-administered medications for this visit.       Family History:  Family History   Problem Relation Age of Onset     Rheumatoid Arthritis Mother      Other Cancer Mother         Nose/skin cancer     Colon Cancer Maternal Grandmother      Cerebrovascular Disease Paternal Grandmother      Coronary Artery Disease Paternal Grandfather      Rheumatoid Arthritis Other      Diabetes Other      " Hypertension No family hx of      Hyperlipidemia No family hx of      Breast Cancer No family hx of      Prostate Cancer No family hx of      Depression No family hx of      Anxiety Disorder No family hx of      Mental Illness No family hx of      Substance Abuse No family hx of      Anesthesia Reaction No family hx of      Asthma No family hx of      Osteoporosis No family hx of      Genetic Disorder No family hx of      Thyroid Disease No family hx of      Obesity No family hx of      Unknown/Adopted No family hx of    Maternal uncle: some acromegalic feature    Social History:  Social History     Tobacco Use     Smoking status: Never     Smokeless tobacco: Never   Substance Use Topics     Alcohol use: Yes     Comment: Only so often       ROS:  Full review of systems taken with the help of the intake sheet. Otherwise a complete 14 point review of systems was taken and is negative unless stated in the history above.      Physical Exam:   There were no vitals taken for this visit.  General: well appearing, no acute distress, pleasant and conversant,   Mental Status/neuro: alert and oriented  Face: symmetrical, normal facial color  Eyes: anicteric, PERRL, no proptosis or lid lag  Neck: suppler, no lymphadenopahty  Thyroid: normal size and texture, no nodule palpable, no bruits  Hand/finger: enlarged (per patient was bigger before and his ring recently loosened and fell)  Heart: regular rhythm, S1S2, no murmur appreciated  Chest: no gynecomastia, skin tags several +  Lung: clear to auscultation bilaterally  Abdomen: soft, NT/ND, no hepatomegaly  Legs: no swelling or edema      Labs : I reviewed data from epic and extract and summarize the pertinent data here.               1/2018  TSH   0.02  Cortisol 5.1       2/19/2020 09:28   Sodium 138   Potassium 3.4   Chloride 104   Carbon Dioxide 29   Urea Nitrogen 8   Creatinine 0.87   GFR Estimate >90   GFR Estimate If Black >90   Calcium 8.5   Anion Gap 5   Albumin 3.7    Protein Total 7.1   Bilirubin Total 0.6   Alkaline Phosphatase 52   ALT 20   AST 19   Hemoglobin A1C 5.1   Cholesterol 238 (H)   FSH 1.0   HDL Cholesterol 77   LDL Cholesterol Calculated 140 (H)   Non HDL Cholesterol 161 (H)   T4 Free 1.27   Testosterone Total 972 (H)   Triglycerides 104   TSH 0.03 (L)   Glucose 84   Ins Growth Factor 1 120   Lutropin 0.2 (L)     MRI Brain: I personally reviewed the original images and agree with the below reports.   Brain/ Pituitary MRI without and with contrast     History: Acromegaly (H). 38 year old male with?acromegaly s/p TSS  twice, RT, long term panhypopituitarism. Post TSS  last MRI 2/2016  post surgical changes (no prior images available.)     ICD-10: Acromegaly (H)     Comparison:  None      Technique: Axial diffusion and FLAIR images of the whole brain  obtained without intravenous contrast. Sagittal T1 and T2-weighted,  coronal T2-weighted, coronal T1-weighted images with focus on the  sella were obtained without intravenous contrast. Post intravenous  contrast using gadolinium coronal and sagittal T1-weighted images were  obtained focused on the sella. Dynamic postcontrast coronal  T1-weighted images were also obtained.     Contrast: 7.5 mL Gadavist     Findings:       Postsurgical changes of transsphenoidal surgery. There are 2 foci of  T1 hyperintensity in the sella turcica without evidence of enhancement  (series 100, image 11). Few scattered punctate white matter T2  hyperintense foci.     No mass is demonstrated within the sella. The pituitary stalk appears  midline. The optic chiasm appears intact and not displaced.  The major cavernous carotid vascular flow-voids appear patent.       FLAIR images through the whole brain are unremarkable, and demonstrate  no mass effect, midline shift, or significant enlargement of the  ventricles. Small mucous retention cysts in the left and right  maxillary sinuses.                                                                       Impression:  1. Postsurgical changes of transsphenoidal resection. No evidence of  recurrence.  2. Few scattered punctate white matter T2 hyperintense foci,  nonspecific.

## 2023-02-01 NOTE — LETTER
2/1/2023       RE: Ivan D Welander  [address]  [facility] [address]     Dear Colleague,    Thank you for referring your patient, Ivan D Welander, to the [facility] ENDOCRINOLOGY CLINIC [place] [facility]. Please see a copy of my visit note below.    Ivan D Welander  is being evaluated via a billable video visit.      How would you like to obtain your AVS? [employer]  For the video visit, send the invitation by: Text to cell phone: [phone]  Will anyone else be joining your video visit? No         Video start 12:30 pm  End 1:00 pm  Amwell                                                                               - Endocrinology Follow up -    Reason for visit/consult:  Acromegaly, panhypopituitarism    Primary care provider: No Ref-Primary, Physician    Assessment and Plan  A 42 year old male with acromegaly s/p TSS twice, RT, long term panhypopituitarism    # Acromegaly  Post TSS twice, last MRI 2/2016 post surgical changes.  Pituitary MRI was done in December 2018 no residual tumor.      # GH deficiency  Acromegaly, post TSS and RT, no residual.was started at [facility] for GH deficiency given no recurrence and colon cancer no recurrence.    Currently on GH 0.5 mg injection but tends to forget recently    - IGF1    # Secondary adrenal insufficiency  Since patient feels well , he prefers to stay on stay Dexamethasone 0.5 mg daily, rather than switching to hydrocortisone.  A1C 5.1 good.     - continue Dexamethasone 0.5 mg daily    - Sol-cotef if needs      # Secondary hypothryoidism  Free T4 good range   -Continue current dose of levothyroxine 125 mcg daily.    # Secondary hypogonadism  Currently long standing testosterone injection 100 mg every week   Considering family planning, that he tried before, we will back to HCG therapy (previsously [facility] in 2017 he was on HCG 1500 every other day)    - currently using testosterone im 120 mg weekly, he may  switch to xyosted 75 g weekly, if it is so, we will check the level 1 month after the switch.       # history of colon cancer  Stable.  - he has colonoscopy 2023 planned        - RTC with me in 1 year.       Damaris Tejada MD  Staff Physician  Endocrinology and Metabolism  License: [id number]    Interval History as of 2/1/2023 : Patient has been doing well. Medication compliance tends to forget GH injection . New event includes: no pertinent medical event noted  .  Interval History as of 3/10/2021 : Patient has been doing well. Ran out GH for 2-3 weeks. Still on process of approval by new insurance. Otherwise doing well and stable.   Interval History as of 3/11/2020 : Patient has been doing well. No major medical event. Considering family planning, that he tried before, we will back to HCG therapy (previsously [email or web address] in 2017 he was on HCG 1500 every other day)  Interval history ; patient has been doing well, however felt a little bit weak since he is off growth hormone injection.  Therefore he was about to resume growth hormone again.  Also he has been missing testosterone injection mentioned some issue his insurance company in the pharmacy?  Otherwise has been stable taking dexamethasone 0.5 mg for adrenal insufficiency.  MRI negative.  HPI: A 37 yo male with acromegaly, here for the care transition due to interstate move.  He moved in 7/2018 from [place], originally from [place].    He was diagnosed Acromegaly in 2007, during the regular physical check up in [facility], and screened and diagnosed. Patient recalls that his pituitary tumor was 1 inch or so.   In 12/2009 first surgery was done at [facility], RT was done after that in [place] 6/2010.   2/2011 he underwent colonoscopy for Colon cancer screening, and results positive for malignancy, then underwent sigmoid resection.     Patient moved to [place], residual tumor detected. his second surgery 12/2015 at [email or web address] for residual tumor.     Patient has been  "taking Dexamethasone, testosterone, GH for his panhypopituitarism started since 2010. He used to be on Hydrocortisone, however tends to forgets afternoon dose, then switched to dexamethasone 0.5 mg in [place] due to missing dose.   Levothyroxine 125 mcg. Testosterone injection every week 100 mg.   Growth hormone (neutropin) 0.5 mg daily since 2017.   [facility], since he was missing dexamethasone for busy baby care, he went to [facility] for sickness.     Last visit 3/20/2018 [facility] Neurosurgery Dr. Parada visit for GHD.     4/2017 colonoscoy: OK,     Past Medical/Surgical History:  Past Medical History:   Diagnosis Date     Asymptomatic LV dysfunction 08/30/2012     Cancer (H)     Colon     Depressive disorder 2011, 2021     Thyroid disease     Acromegaly     Past Surgical History:   Procedure Laterality Date     APPENDECTOMY       COLONOSCOPY       GI SURGERY  2011    sigmoid colectomy     HEAD & NECK SURGERY  2010    transphenoidal hypophysectomy, repeat 2016       Allergies:  No Known Allergies    Current Medications   Current Outpatient Medications   Medication     bisacodyl (DULCOLAX) 5 MG EC tablet     dexamethasone (DECADRON) 1 MG tablet     dexamethasone (DECADRON) 1 MG tablet     insulin pen needle (PENTIPS) 31G X 8 MM miscellaneous     levothyroxine (SYNTHROID/LEVOTHROID) 125 MCG tablet     polyethylene glycol (GOLYTELY) 236 g suspension     sertraline (ZOLOFT) 25 MG tablet     sildenafil (VIAGRA) 50 MG tablet     Somatropin (NORDITROPIN FLEXPRO) 5 MG/1.5ML SOPN     somatropin (NUTROPIN AQ 10) 10 MG/2ML SOPN PEN injection     Somatropin (NUTROPIN AQ NUSPIN 5) 5 MG/2ML SOPN     syringe/needle, disp, (B-D SYRINGE/NEEDLE) 23G X 1\" 3 ML MISC     syringe/needle, disp, 23G X 1\" 3 ML MISC     Testosterone Enanthate (XYOSTED) 75 MG/0.5ML SOAJ     hydrocortisone sodium succinate PF (SOLU-CORTEF) 100 MG injection     No current facility-administered medications for this visit.       Family History:  Family " History   Problem Relation Age of Onset     Rheumatoid Arthritis Mother      Other Cancer Mother         Nose/skin cancer     Colon Cancer Maternal Grandmother      Cerebrovascular Disease Paternal Grandmother      Coronary Artery Disease Paternal Grandfather      Rheumatoid Arthritis Other      Diabetes Other      Hypertension No family hx of      Hyperlipidemia No family hx of      Breast Cancer No family hx of      Prostate Cancer No family hx of      Depression No family hx of      Anxiety Disorder No family hx of      Mental Illness No family hx of      Substance Abuse No family hx of      Anesthesia Reaction No family hx of      Asthma No family hx of      Osteoporosis No family hx of      Genetic Disorder No family hx of      Thyroid Disease No family hx of      Obesity No family hx of      Unknown/Adopted No family hx of    Maternal uncle: some acromegalic feature    Social History:  Social History     Tobacco Use     Smoking status: Never     Smokeless tobacco: Never   Substance Use Topics     Alcohol use: Yes     Comment: Only so often       ROS:  Full review of systems taken with the help of the intake sheet. Otherwise a complete 14 point review of systems was taken and is negative unless stated in the history above.      Physical Exam:   There were no vitals taken for this visit.  General: well appearing, no acute distress, pleasant and conversant,   Mental Status/neuro: alert and oriented  Face: symmetrical, normal facial color  Eyes: anicteric, PERRL, no proptosis or lid lag  Neck: suppler, no lymphadenopahty  Thyroid: normal size and texture, no nodule palpable, no bruits  Hand/finger: enlarged (per patient was bigger before and his ring recently loosened and fell)  Heart: regular rhythm, S1S2, no murmur appreciated  Chest: no gynecomastia, skin tags several +  Lung: clear to auscultation bilaterally  Abdomen: soft, NT/ND, no hepatomegaly  Legs: no swelling or edema      Labs : I reviewed data from  epic and extract and summarize the pertinent data here.               1/2018  TSH   0.02  Cortisol 5.1       2/19/2020 09:28   Sodium 138   Potassium 3.4   Chloride 104   Carbon Dioxide 29   Urea Nitrogen 8   Creatinine 0.87   GFR Estimate >90   GFR Estimate If Black >90   Calcium 8.5   Anion Gap 5   Albumin 3.7   Protein Total 7.1   Bilirubin Total 0.6   Alkaline Phosphatase 52   ALT 20   AST 19   Hemoglobin A1C 5.1   Cholesterol 238 (H)   FSH 1.0   HDL Cholesterol 77   LDL Cholesterol Calculated 140 (H)   Non HDL Cholesterol 161 (H)   T4 Free 1.27   Testosterone Total 972 (H)   Triglycerides 104   TSH 0.03 (L)   Glucose 84   Ins Growth Factor 1 120   Lutropin 0.2 (L)     MRI Brain: I personally reviewed the original images and agree with the below reports.   Brain/ Pituitary MRI without and with contrast     History: Acromegaly (H). 38 year old male with?acromegaly s/p TSS  twice, RT, long term panhypopituitarism. Post TSS  last MRI 2/2016  post surgical changes (no prior images available.)     ICD-10: Acromegaly (H)     Comparison:  None      Technique: Axial diffusion and FLAIR images of the whole brain  obtained without intravenous contrast. Sagittal T1 and T2-weighted,  coronal T2-weighted, coronal T1-weighted images with focus on the  sella were obtained without intravenous contrast. Post intravenous  contrast using gadolinium coronal and sagittal T1-weighted images were  obtained focused on the sella. Dynamic postcontrast coronal  T1-weighted images were also obtained.     Contrast: 7.5 mL Gadavist     Findings:       Postsurgical changes of transsphenoidal surgery. There are 2 foci of  T1 hyperintensity in the sella turcica without evidence of enhancement  (series 100, image 11). Few scattered punctate white matter T2  hyperintense foci.     No mass is demonstrated within the sella. The pituitary stalk appears  midline. The optic chiasm appears intact and not displaced.  The major cavernous carotid vascular  flow-voids appear patent.       FLAIR images through the whole brain are unremarkable, and demonstrate  no mass effect, midline shift, or significant enlargement of the  ventricles. Small mucous retention cysts in the left and right  maxillary sinuses.                                                                      Impression:  1. Postsurgical changes of transsphenoidal resection. No evidence of  recurrence.  2. Few scattered punctate white matter T2 hyperintense foci,  nonspecific.               Sincerely,    Damaris Tejada MD

## 2023-02-06 DIAGNOSIS — E23.0 GROWTH HORMONE DEFICIENCY (H): ICD-10-CM

## 2023-02-09 RX ORDER — SOMATROPIN 10 MG/2ML
INJECTION, SOLUTION SUBCUTANEOUS
Qty: 2 ML | Refills: 5 | Status: SHIPPED | OUTPATIENT
Start: 2023-02-09 | End: 2023-02-14

## 2023-02-09 NOTE — TELEPHONE ENCOUNTER
NUTROPIN AQ NUSPIN 10 10 SOPN      Last Written Prescription Date:  8/10/22  Last Fill Quantity: 2ml,   # refills: 5  Last Office Visit : 2/1/23  Future Office visit:  none    Routing refill request to provider for review/approval because:  Drug not on the [facility], P or [facility] refill protocol or controlled substance

## 2023-02-10 DIAGNOSIS — E23.0 GROWTH HORMONE DEFICIENCY (H): ICD-10-CM

## 2023-02-14 NOTE — TELEPHONE ENCOUNTER
NUTROPIN AQ NUSPIN 10 10 MG/2ML SOPN PEN injection    Last Written Prescription Date:  02-  Last Fill Quantity: 2 ml,   # refills: 5  Last Office Visit : 02-  Future Office visit:  Not on file    Routing refill request to provider for review/approval because:  Drug not on the [facility], P or [facility] refill protocol or controlled substance

## 2023-02-15 RX ORDER — SOMATROPIN 10 MG/2ML
INJECTION, SOLUTION SUBCUTANEOUS
Qty: 2 ML | Refills: 5 | Status: SHIPPED | OUTPATIENT
Start: 2023-02-15 | End: 2023-08-16

## 2023-03-03 ENCOUNTER — VIRTUAL VISIT (OUTPATIENT)
Dept: URGENT CARE | Facility: CLINIC | Age: 43
End: 2023-03-03
Payer: COMMERCIAL

## 2023-03-03 DIAGNOSIS — J22 LOWER RESPIRATORY INFECTION: Primary | ICD-10-CM

## 2023-03-03 PROCEDURE — 99213 OFFICE O/P EST LOW 20 MIN: CPT | Mod: VID

## 2023-03-03 RX ORDER — AZITHROMYCIN 250 MG/1
TABLET, FILM COATED ORAL
Qty: 6 TABLET | Refills: 0 | Status: SHIPPED | OUTPATIENT
Start: 2023-03-03 | End: 2023-03-08

## 2023-03-03 NOTE — PROGRESS NOTES
Assessment & Plan     Lower respiratory infection  - azithromycin (ZITHROMAX) 250 MG tablet; Take 2 tablets (500 mg) by mouth daily for 1 day, THEN 1 tablet (250 mg) daily for 4 days.      Return in about 5 days (around 3/8/2023) for visit with primary care provider or go to urgent care if not improving.     Dalia Quarles PA-C  [facility]    Subjective   Ivan D Welander is a 42 year old who presents for the following health issues    HPI    Alex presents via video for evaluation of a cough.  Symptoms first began 2 weeks ago.  He had a sore throat and a cough.  His cough feels it is coming from a tickle in his throat and deeper in his chest.  His wife is a nurse and listen to his lungs and stated that he had a band of wheezing in his left lower lung.  His cough is not productive.  It does not seem to be getting better or worse over time.  He does not feel significantly short of breath.  He has not had a fever.    Review of Systems   ROS negative except as stated above.      Objective    Physical Exam   GENERAL: Healthy, alert and no distress  EYES: Eyes grossly normal to inspection.  No discharge or erythema, or obvious scleral/conjunctival abnormalities.  HENT: Normal cephalic/atraumatic.  External ears, nose and mouth without ulcers or lesions.  No nasal drainage visible.  RESP: Intermittent cough No audible wheeze or visible cyanosis.  No visible retractions or increased work of breathing.    SKIN: Visible skin clear. No significant rash, abnormal pigmentation or lesions.  NEURO: Cranial nerves grossly intact.  Mentation and speech appropriate for age.  PSYCH: Mentation appears normal, affect normal/bright, judgement and insight intact, normal speech and appearance well-groomed.    Type of service:  Video Visit  Video Start Time: 11:35AM  Video End Time: 11:47AM  Originating Location (pt. Location): Home  Distant Location (provider location):  [facility]-  offsite at home, Fredy SINGH  Platform used for Video Visit: Erlinda

## 2023-03-03 NOTE — PATIENT INSTRUCTIONS
Azithromycin 250 mg as directed- 2 pills together at the same time today then 1 pill a day for the next 4 days.    Rest! Your body needs more rest to heal.  Drink plenty of fluids (warm fluids like tea or soup are soothing and reduce cough)  Sit in the bathroom with a hot shower running and breathe in the steam.  Honey may soothe your sore throat and help manage your cough- may take straight or in warm water with lemon juice.  Avoid smoke (cigarettes, bonfires, fireplace, wood burning stoves).  Take Tylenol or an NSAID such as ibuprofen or naproxen as needed for pain.  Delsym (dextromethorphan polistirex) is an over the counter cough medication that lasts 12 hours.   Mucinex or Robitussin (guiafenesin) thin mucus and may help it to loosen more quickly    Good handwashing is the best way to prevent spread of germs  Present to emergency room if you develop trouble breathing, swallowing or cough-up blood.  Follow up with your primary care provider if symptoms worsen or fail to improve as expected.

## 2023-04-03 ENCOUNTER — TELEPHONE (OUTPATIENT)
Dept: ENDOCRINOLOGY | Facility: CLINIC | Age: 43
End: 2023-04-03
Payer: COMMERCIAL

## 2023-04-03 DIAGNOSIS — E23.0 HYPOPITUITARISM (H): ICD-10-CM

## 2023-04-03 DIAGNOSIS — E29.1 HYPOGONADISM MALE: ICD-10-CM

## 2023-04-03 RX ORDER — NEEDLES, SAFETY 22GX1 1/2"
NEEDLE, DISPOSABLE MISCELLANEOUS
Qty: 15 EACH | Refills: 3 | Status: SHIPPED | OUTPATIENT
Start: 2023-04-03 | End: 2024-02-11

## 2023-04-03 RX ORDER — TESTOSTERONE CYPIONATE 200 MG/ML
100 INJECTION, SOLUTION INTRAMUSCULAR WEEKLY
Qty: 6 ML | Refills: 5 | Status: SHIPPED | OUTPATIENT
Start: 2023-04-03 | End: 2023-11-15

## 2023-04-03 NOTE — TELEPHONE ENCOUNTER
[facility] Call Center    Phone Message    May a detailed message be left on voicemail: yes     Reason for Call: Other: Patient's wife called in and asked if there was any update on the PA for her husbands testosterone medication or if we could refill the IM injectable that he was on before so that he can at least get some medication in the mean time.  Please reach out.      Action Taken: Other: Endo    Travel Screening: Not Applicable

## 2023-04-03 NOTE — TELEPHONE ENCOUNTER
Maty patient spouse calling back for nurse Ana Rosa regarding PA she says it is needed for his medication and the pharmacy will be sending forms as such, she also asks if there is a generic and if we can send the old IM medication to the [facility] on [address]. Please advise thank you

## 2023-04-03 NOTE — TELEPHONE ENCOUNTER
Will await PA forms from pharmacy.  Pended prior testosterone therapy.  Routing to provider for signature.     Ana Rosa Pierre on 4/3/2023 at 1:59 PM

## 2023-04-03 NOTE — TELEPHONE ENCOUNTER
Spoke with patient's wife.  It looks like the script was sent on 3/15.  We have not received a prior authorization from the pharmacy.  Patient's wife is going to call the pharmacy and then call back if needed.     Ana Rosa Pierre on 4/3/2023 at 1:45 PM

## 2023-04-07 ENCOUNTER — VIRTUAL VISIT (OUTPATIENT)
Dept: FAMILY MEDICINE | Facility: CLINIC | Age: 43
End: 2023-04-07
Payer: COMMERCIAL

## 2023-04-07 DIAGNOSIS — E29.1 HYPOGONADISM MALE: ICD-10-CM

## 2023-04-07 DIAGNOSIS — E23.0 HYPOPITUITARISM (H): ICD-10-CM

## 2023-04-07 DIAGNOSIS — N45.1 RIGHT EPIDIDYMITIS: Primary | ICD-10-CM

## 2023-04-07 PROCEDURE — 99213 OFFICE O/P EST LOW 20 MIN: CPT | Mod: VID | Performed by: STUDENT IN AN ORGANIZED HEALTH CARE EDUCATION/TRAINING PROGRAM

## 2023-04-07 RX ORDER — LEVOFLOXACIN 500 MG/1
500 TABLET, FILM COATED ORAL DAILY
Qty: 10 TABLET | Refills: 0 | Status: SHIPPED | OUTPATIENT
Start: 2023-04-07 | End: 2023-04-17

## 2023-04-07 ASSESSMENT — PATIENT HEALTH QUESTIONNAIRE - PHQ9
SUM OF ALL RESPONSES TO PHQ QUESTIONS 1-9: 2
10. IF YOU CHECKED OFF ANY PROBLEMS, HOW DIFFICULT HAVE THESE PROBLEMS MADE IT FOR YOU TO DO YOUR WORK, TAKE CARE OF THINGS AT HOME, OR GET ALONG WITH OTHER PEOPLE: NOT DIFFICULT AT ALL
SUM OF ALL RESPONSES TO PHQ QUESTIONS 1-9: 2

## 2023-04-07 NOTE — H&P (VIEW-ONLY)
"Alex is a 42 year old who is being evaluated via a billable video visit.      How would you like to obtain your AVS? MyChart  If the video visit is dropped, the invitation should be resent by: Text to cell phone: [phone]  Will anyone else be joining your video visit? No          Assessment & Plan     Right epididymitis  Unable to do physical exam today but symptoms do seem consistent with epididymitis.  Given his history we will treat with course of antibiotics.  Reports no sexual activity and no concern for STDs.  Plan for course of Levaquin and follow-up in clinic if things not improving.  Potential side effects and lack of response discussed.  - levofloxacin (LEVAQUIN) 500 MG tablet  Dispense: 10 tablet; Refill: 0    Hypopituitarism (H)  Hypogonadism male  Established with endocrinology and on hormone replacement.      BMI:   Estimated body mass index is 26.42 kg/m  as calculated from the following:    Height as of 5/6/22: 1.867 m (6' 1.5\").    Weight as of 5/6/22: 92.1 kg (203 lb).       Jadon Hannah MD  [facility]    Subjective   Alex is a 42 year old, presenting for the following health issues:  Testicular/scrotal Pain (Swollen and sore testicle)  -Epididymitis. Related to having been off testosterone injections for about a month due to an insurance issue. This has happened once or twice before and antibiotics cleared it up       View : No data to display.              Testicular/scrotal Pain    History of Present Illness       Reason for visit:  Epididymitis. Related to having been off my testosterone injections for about a month due to an insurance issue. This has happened once or twice before and antibiotics cleared it up.  Symptom onset:  1-3 days ago  Symptoms include:  Swollen and sore testicle.  Symptom intensity:  Moderate  Symptom progression:  Staying the same  Had these symptoms before:  Yes  Has tried/received treatment for these symptoms:  Yes  Previous " treatment was successful:  Yes  Prior treatment description:  Antibiotics  What makes it worse:  No  What makes it better:  No    He eats 2-3 servings of fruits and vegetables daily.He consumes 0 sweetened beverage(s) daily.He exercises with enough effort to increase his heart rate 9 or less minutes per day.  He exercises with enough effort to increase his heart rate 3 or less days per week. He is missing 3 dose(s) of medications per week.  He is not taking prescribed medications regularly due to remembering to take.    Today's PHQ-9         PHQ-9 Total Score: 2    PHQ-9 Q9 Thoughts of better off dead/self-harm past 2 weeks :   Not at all    How difficult have these problems made it for you to do your work, take care of things at home, or get along with other people: Not difficult at all    Patient following with endocrinology and recently was off testosterone replacement for a short time.  Has had this happen in the past and diagnosed with epididymitis which resolved with antibiotics.  Symptoms one-sided.  No injuries or trauma.  No masses that he is noted.    Review of Systems   Genitourinary: Positive for testicular pain.      Constitutional, HEENT, cardiovascular, pulmonary, gi and gu systems are negative, except as otherwise noted.      Objective           Vitals:  No vitals were obtained today due to virtual visit.    Physical Exam   GENERAL: Healthy, alert and no distress  EYES: Eyes grossly normal to inspection.  No discharge or erythema, or obvious scleral/conjunctival abnormalities.  RESP: No audible wheeze, cough, or visible cyanosis.  No visible retractions or increased work of breathing.    SKIN: Visible skin clear. No significant rash, abnormal pigmentation or lesions.  NEURO: Cranial nerves grossly intact.  Mentation and speech appropriate for age.  PSYCH: Mentation appears normal, affect normal/bright, judgement and insight intact, normal speech and appearance well-groomed.          Video-Visit  Details    Type of service:  Video Visit     Originating Location (pt. Location): Home    Distant Location (provider location):  On-site  Platform used for Video Visit: Erlinda

## 2023-04-07 NOTE — PROGRESS NOTES
"Alex is a 42 year old who is being evaluated via a billable video visit.      How would you like to obtain your AVS? MyChart  If the video visit is dropped, the invitation should be resent by: Text to cell phone: [phone]  Will anyone else be joining your video visit? No          Assessment & Plan     Right epididymitis  Unable to do physical exam today but symptoms do seem consistent with epididymitis.  Given his history we will treat with course of antibiotics.  Reports no sexual activity and no concern for STDs.  Plan for course of Levaquin and follow-up in clinic if things not improving.  Potential side effects and lack of response discussed.  - levofloxacin (LEVAQUIN) 500 MG tablet  Dispense: 10 tablet; Refill: 0    Hypopituitarism (H)  Hypogonadism male  Established with endocrinology and on hormone replacement.      BMI:   Estimated body mass index is 26.42 kg/m  as calculated from the following:    Height as of 5/6/22: 1.867 m (6' 1.5\").    Weight as of 5/6/22: 92.1 kg (203 lb).       Jadon Hannah MD  [facility]    Subjective   Alex is a 42 year old, presenting for the following health issues:  Testicular/scrotal Pain (Swollen and sore testicle)  -Epididymitis. Related to having been off testosterone injections for about a month due to an insurance issue. This has happened once or twice before and antibiotics cleared it up       View : No data to display.              Testicular/scrotal Pain    History of Present Illness       Reason for visit:  Epididymitis. Related to having been off my testosterone injections for about a month due to an insurance issue. This has happened once or twice before and antibiotics cleared it up.  Symptom onset:  1-3 days ago  Symptoms include:  Swollen and sore testicle.  Symptom intensity:  Moderate  Symptom progression:  Staying the same  Had these symptoms before:  Yes  Has tried/received treatment for these symptoms:  Yes  Previous " treatment was successful:  Yes  Prior treatment description:  Antibiotics  What makes it worse:  No  What makes it better:  No    He eats 2-3 servings of fruits and vegetables daily.He consumes 0 sweetened beverage(s) daily.He exercises with enough effort to increase his heart rate 9 or less minutes per day.  He exercises with enough effort to increase his heart rate 3 or less days per week. He is missing 3 dose(s) of medications per week.  He is not taking prescribed medications regularly due to remembering to take.    Today's PHQ-9         PHQ-9 Total Score: 2    PHQ-9 Q9 Thoughts of better off dead/self-harm past 2 weeks :   Not at all    How difficult have these problems made it for you to do your work, take care of things at home, or get along with other people: Not difficult at all    Patient following with endocrinology and recently was off testosterone replacement for a short time.  Has had this happen in the past and diagnosed with epididymitis which resolved with antibiotics.  Symptoms one-sided.  No injuries or trauma.  No masses that he is noted.    Review of Systems   Genitourinary: Positive for testicular pain.      Constitutional, HEENT, cardiovascular, pulmonary, gi and gu systems are negative, except as otherwise noted.      Objective           Vitals:  No vitals were obtained today due to virtual visit.    Physical Exam   GENERAL: Healthy, alert and no distress  EYES: Eyes grossly normal to inspection.  No discharge or erythema, or obvious scleral/conjunctival abnormalities.  RESP: No audible wheeze, cough, or visible cyanosis.  No visible retractions or increased work of breathing.    SKIN: Visible skin clear. No significant rash, abnormal pigmentation or lesions.  NEURO: Cranial nerves grossly intact.  Mentation and speech appropriate for age.  PSYCH: Mentation appears normal, affect normal/bright, judgement and insight intact, normal speech and appearance well-groomed.          Video-Visit  Details    Type of service:  Video Visit     Originating Location (pt. Location): Home    Distant Location (provider location):  On-site  Platform used for Video Visit: Erlinda

## 2023-04-11 ENCOUNTER — TELEPHONE (OUTPATIENT)
Dept: GASTROENTEROLOGY | Facility: CLINIC | Age: 43
End: 2023-04-11

## 2023-04-11 ENCOUNTER — TELEPHONE (OUTPATIENT)
Dept: GASTROENTEROLOGY | Facility: CLINIC | Age: 43
End: 2023-04-11
Payer: COMMERCIAL

## 2023-04-11 DIAGNOSIS — Z98.890 HISTORY OF OPEN SIGMOIDECTOMY: ICD-10-CM

## 2023-04-11 DIAGNOSIS — Z86.0100 HISTORY OF COLONIC POLYPS: ICD-10-CM

## 2023-04-11 DIAGNOSIS — Z90.49 HISTORY OF OPEN SIGMOIDECTOMY: ICD-10-CM

## 2023-04-11 DIAGNOSIS — C18.7 MALIGNANT NEOPLASM OF SIGMOID COLON (H): Primary | ICD-10-CM

## 2023-04-11 NOTE — TELEPHONE ENCOUNTER
Attempted to contact patient regarding upcoming Colonoscopy  procedure on 4/24/23 for pre assessment questions. No answer.     Left message to return call to [phone] #[phone]    Discuss Covid policy and designated  policy.    Pre op exam? N/A    Arrival time: 0715. Procedure time: 0815    [facility]; [address] [place] [address], 5[address] Floor, [place] [phone]    Sedation type: Conscious sedation     Anticoagulants: No    Electronic implanted devices? No    Diabetic? No    Indication for procedure: Malignant neoplasm of sigmoid colon    Bowel prep recommendation: Miralax prep without magnesium citrate     Prep instructions sent via [employer].     Zehra Moulton RN  Endoscopy Procedure Pre Assessment RN

## 2023-04-11 NOTE — TELEPHONE ENCOUNTER
Referral  Received: Today  Jovanni Mckeon Alicya A, RN; January Morse, RN  Hello,     I received a call from this patient to be rescheduled from his appointment on  w/ MAC however, there are no appointments available it appears in April or May with MAC sedation for Dr. Lepe and also it appears the referral  in january this year.     Can you send a new referral over for this patient? Also the patient is asking if moderate sedation is ok for procedure? Lastly they asked if they would have to be seen by Dr Lepe as he is trying to reschedule for a earlier appointment.     Thank you,     Jovanni

## 2023-04-14 ENCOUNTER — TELEPHONE (OUTPATIENT)
Dept: ENDOCRINOLOGY | Facility: CLINIC | Age: 43
End: 2023-04-14

## 2023-04-14 ENCOUNTER — TELEPHONE (OUTPATIENT)
Dept: ENDOCRINOLOGY | Facility: CLINIC | Age: 43
End: 2023-04-14
Payer: COMMERCIAL

## 2023-04-14 NOTE — TELEPHONE ENCOUNTER
Provider notified. PA denial [employer]   Letter of Appeal needed.   America Gold RN on 4/14/2023 at 11:34 AM

## 2023-04-14 NOTE — TELEPHONE ENCOUNTER
I see he was prescribed testosterone cypionate IM  Injections . Is tat in place of the Xyosted? Karma Estrada RN on 4/14/2023 at 11:36 AM

## 2023-04-17 NOTE — TELEPHONE ENCOUNTER
Pt returning call    Pre assessment questions completed for upcoming Colonoscopy  procedure scheduled on 4/24/23    COVID policy reviewed.     Reviewed procedural arrival time 0715, procedure time 0815 and facility location [facility]; [address] [place] [address], [address] [facility], [place] [address]    Designated  policy reviewed. Instructed to have someone stay 6 hours post procedure.     Reviewed procedure prep instructions.     Patient verbalized understanding and had no questions or concerns at this time.    Zehra Moulton RN  Endoscopy Procedure Pre Assessment RN

## 2023-04-17 NOTE — TELEPHONE ENCOUNTER
Second attempt for pre-assessment prior to upcoming colonoscopy on 4.24.23     No answer.  Left message to return call [phone] #4    MyChart message sent     Caitlin Tilley RN  Endoscopy Procedure Pre Assessment RN

## 2023-04-20 ENCOUNTER — PATIENT OUTREACH (OUTPATIENT)
Dept: CARE COORDINATION | Facility: CLINIC | Age: 43
End: 2023-04-20
Payer: COMMERCIAL

## 2023-04-24 ENCOUNTER — HOSPITAL ENCOUNTER (OUTPATIENT)
Facility: AMBULATORY SURGERY CENTER | Age: 43
Discharge: HOME | End: 2023-04-24
Attending: INTERNAL MEDICINE
Payer: COMMERCIAL

## 2023-04-24 ENCOUNTER — ANESTHESIA (OUTPATIENT)
Dept: SURGERY | Facility: AMBULATORY SURGERY CENTER | Age: 43
End: 2023-04-24
Payer: COMMERCIAL

## 2023-04-24 ENCOUNTER — MYC MEDICAL ADVICE (OUTPATIENT)
Dept: FAMILY MEDICINE | Facility: CLINIC | Age: 43
End: 2023-04-24

## 2023-04-24 ENCOUNTER — ANESTHESIA EVENT (OUTPATIENT)
Dept: SURGERY | Facility: AMBULATORY SURGERY CENTER | Age: 43
End: 2023-04-24
Payer: COMMERCIAL

## 2023-04-24 VITALS
SYSTOLIC BLOOD PRESSURE: 123 MMHG | RESPIRATION RATE: 14 BRPM | OXYGEN SATURATION: 98 % | DIASTOLIC BLOOD PRESSURE: 86 MMHG | TEMPERATURE: 97.3 F | HEART RATE: 58 BPM | HEIGHT: 74 IN | BODY MASS INDEX: 26.05 KG/M2 | WEIGHT: 203 LBS

## 2023-04-24 VITALS — HEART RATE: 63 BPM

## 2023-04-24 DIAGNOSIS — Z23 NEED FOR COVID-19 VACCINE: Primary | ICD-10-CM

## 2023-04-24 LAB — COLONOSCOPY: NORMAL

## 2023-04-24 PROCEDURE — 45380 COLONOSCOPY AND BIOPSY: CPT | Mod: 59,33

## 2023-04-24 PROCEDURE — 88305 TISSUE EXAM BY PATHOLOGIST: CPT | Mod: 26 | Performed by: PATHOLOGY

## 2023-04-24 PROCEDURE — 45385 COLONOSCOPY W/LESION REMOVAL: CPT | Mod: 33

## 2023-04-24 PROCEDURE — 88305 TISSUE EXAM BY PATHOLOGIST: CPT | Mod: TC | Performed by: INTERNAL MEDICINE

## 2023-04-24 RX ORDER — PROPOFOL 10 MG/ML
INJECTION, EMULSION INTRAVENOUS PRN
Status: DISCONTINUED | OUTPATIENT
Start: 2023-04-24 | End: 2023-04-24

## 2023-04-24 RX ORDER — LIDOCAINE 40 MG/G
CREAM TOPICAL
Status: DISCONTINUED | OUTPATIENT
Start: 2023-04-24 | End: 2023-04-25 | Stop reason: HOSPADM

## 2023-04-24 RX ORDER — NALOXONE HYDROCHLORIDE 0.4 MG/ML
0.4 INJECTION, SOLUTION INTRAMUSCULAR; INTRAVENOUS; SUBCUTANEOUS
Status: CANCELLED | OUTPATIENT
Start: 2023-04-24

## 2023-04-24 RX ORDER — LIDOCAINE HYDROCHLORIDE 20 MG/ML
INJECTION, SOLUTION INFILTRATION; PERINEURAL PRN
Status: DISCONTINUED | OUTPATIENT
Start: 2023-04-24 | End: 2023-04-24

## 2023-04-24 RX ORDER — SODIUM CHLORIDE, SODIUM LACTATE, POTASSIUM CHLORIDE, CALCIUM CHLORIDE 600; 310; 30; 20 MG/100ML; MG/100ML; MG/100ML; MG/100ML
500 INJECTION, SOLUTION INTRAVENOUS CONTINUOUS
Status: DISCONTINUED | OUTPATIENT
Start: 2023-04-24 | End: 2023-04-25 | Stop reason: HOSPADM

## 2023-04-24 RX ORDER — FLUMAZENIL 0.1 MG/ML
0.2 INJECTION, SOLUTION INTRAVENOUS
Status: CANCELLED | OUTPATIENT
Start: 2023-04-24 | End: 2023-04-24

## 2023-04-24 RX ORDER — ONDANSETRON 2 MG/ML
4 INJECTION INTRAMUSCULAR; INTRAVENOUS EVERY 6 HOURS PRN
Status: CANCELLED | OUTPATIENT
Start: 2023-04-24

## 2023-04-24 RX ORDER — NALOXONE HYDROCHLORIDE 0.4 MG/ML
0.2 INJECTION, SOLUTION INTRAMUSCULAR; INTRAVENOUS; SUBCUTANEOUS
Status: CANCELLED | OUTPATIENT
Start: 2023-04-24

## 2023-04-24 RX ORDER — ONDANSETRON 4 MG/1
4 TABLET, ORALLY DISINTEGRATING ORAL EVERY 6 HOURS PRN
Status: CANCELLED | OUTPATIENT
Start: 2023-04-24

## 2023-04-24 RX ORDER — ONDANSETRON 2 MG/ML
4 INJECTION INTRAMUSCULAR; INTRAVENOUS
Status: DISCONTINUED | OUTPATIENT
Start: 2023-04-24 | End: 2023-04-25 | Stop reason: HOSPADM

## 2023-04-24 RX ORDER — PROCHLORPERAZINE MALEATE 10 MG
10 TABLET ORAL EVERY 6 HOURS PRN
Status: CANCELLED | OUTPATIENT
Start: 2023-04-24

## 2023-04-24 RX ORDER — PROPOFOL 10 MG/ML
INJECTION, EMULSION INTRAVENOUS CONTINUOUS PRN
Status: DISCONTINUED | OUTPATIENT
Start: 2023-04-24 | End: 2023-04-24

## 2023-04-24 RX ADMIN — SODIUM CHLORIDE, SODIUM LACTATE, POTASSIUM CHLORIDE, CALCIUM CHLORIDE 500 ML: 600; 310; 30; 20 INJECTION, SOLUTION INTRAVENOUS at 08:01

## 2023-04-24 RX ADMIN — PROPOFOL 200 MCG/KG/MIN: 10 INJECTION, EMULSION INTRAVENOUS at 09:20

## 2023-04-24 RX ADMIN — LIDOCAINE HYDROCHLORIDE 100 MG: 20 INJECTION, SOLUTION INFILTRATION; PERINEURAL at 09:20

## 2023-04-24 RX ADMIN — PROPOFOL 100 MG: 10 INJECTION, EMULSION INTRAVENOUS at 09:20

## 2023-04-24 NOTE — DISCHARGE INSTRUCTIONS
[facility]  Home Care Following Anesthesia  For 24 hours after surgery:  Get plenty of rest.  A responsible adult must stay with you for at least 24 hours after you leave the surgery center.  Do not drive or use heavy equipment.  If you have weakness or tingling, don't drive or use heavy equipment until this feeling goes away.   Do not drink alcohol.   Avoid strenuous or risky activities.  Ask for help when climbing stairs.  You may feel lightheaded.  IF so, sit for a few minutes before standing.  Have someone help you get up.   If you have nausea (feel sick to your stomach): Drink only clear liquids such as apple juice, ginger ale, broth or 7-Up.  Rest may also help.  Be sure to drink enough fluids.  Move to a regular diet as you feel able.   You may have a slight fever.  Call the doctor if your fever is over 100 F (37.7 C) (taken under the tongue) or lasts longer than 24 hours.  You may have a dry mouth, a sore throat, muscle aches or trouble sleeping. These should go away after 24 hours.  Do not make important or legal decisions.   It is recommended to avoid smoking.               Tips for taking pain medications  To get the best pain relief possible, remember these points:  Take pain medications as directed, before pain becomes severe.  Pain medication can upset your stomach: taking it with food may help.  Constipation is a common side effect of pain medication. Drink plenty of  fluids.  Eat foods high in fiber. Take a stool softener if recommended by your doctor or pharmacist.  Do not drink alcohol, drive or operate machinery while taking pain medications.  Ask about other ways to control pain, such as with heat, ice or relaxation.    Tylenol/Acetaminophen Consumption  To help encourage the safe use of acetaminophen, the makers of TYLENOL  have lowered the maximum daily dose for single-ingredient Extra Strength TYLENOL  (acetaminophen) products sold in the [place] from 8 pills  per day (4,000 mg) to 6 pills per day (3,000 mg). The dosing interval has also changed from 2 pills every 4-6 hours to 2 pills every 6 hours.  If you feel your pain relief is insufficient, you may take Tylenol/Acetaminophen in addition to your narcotic pain medication.   Be careful not to exceed 3,000 mg of Tylenol/Acetaminophen in a 24 hour period from all sources.  If you are taking extra strength Tylenol/acetaminophen (500 mg), the maximum dose is 6 tablets in 24 hours.  If you are taking regular strength acetaminophen (325 mg), the maximum dose is 9 tablets in 24 hours.    Call a doctor for any of the following:  Signs of infection (fever, growing tenderness at the surgery site, a large amount of drainage or bleeding, severe pain, foul-smelling drainage, redness, swelling).  It has been over 8 to 10 hours since surgery and you are still not able to urinate (pass water).  Headache for over 24 hours.  Numbness, tingling or weakness the day after surgery (if you had spinal anesthesia).  Signs of Covid-19 infection (temperature over 100 degrees, shortness of breath, cough, loss of taste/smell, generalized body aches, persistent headache, chills, sore throat, nausea/vomiting/diarrhea)  Your doctor is:     MD Ovi Lepe

## 2023-04-24 NOTE — ANESTHESIA CARE TRANSFER NOTE
Patient: Ivan D Welander    Procedure: Procedure(s):  COLONOSCOPY, WITH POLYPECTOMY AND BIOPSY       Diagnosis: Malignant neoplasm of sigmoid colon (H) [C18.7]  Acromegaly (H) [E22.0]  History of open sigmoidectomy [Z98.890, Z90.49]  Diagnosis Additional Information: No value filed.    Anesthesia Type:   MAC     Note:    Oropharynx: oropharynx clear of all foreign objects  Level of Consciousness: awake  Oxygen Supplementation: room air    Independent Airway: airway patency satisfactory and stable  Dentition: dentition unchanged  Vital Signs Stable: post-procedure vital signs reviewed and stable    Patient transferred to: Phase II  Comments: VSS and WNL, comfortable, no PONV, report to Panchito CHAN  Handoff Report: Identifed the Patient, Identified the Reponsible Provider, Reviewed the pertinent medical history, Discussed the surgical course, Reviewed Intra-OP anesthesia mangement and issues during anesthesia, Set expectations for post-procedure period and Allowed opportunity for questions and acknowledgement of understanding      Vitals:  Vitals Value Taken Time   BP     Temp     Pulse     Resp     SpO2         Electronically Signed By: DEDE Gomez CRNA  April 24, 2023  9:53 AM

## 2023-04-24 NOTE — INTERVAL H&P NOTE
"I have reviewed the surgical (or preoperative) H&P that is linked to this encounter, and examined the patient. There are no significant changes    Clinical Conditions Present on Arrival:  Clinically Significant Risk Factors Present on Admission                  # Overweight: Estimated body mass index is 26.42 kg/m  as calculated from the following:    Height as of this encounter: 1.867 m (6' 1.5\").    Weight as of this encounter: 92.1 kg (203 lb).       "

## 2023-04-24 NOTE — ANESTHESIA POSTPROCEDURE EVALUATION
Patient: Ivan D Welander    Procedure: Procedure(s):  COLONOSCOPY, WITH POLYPECTOMY AND BIOPSY       Anesthesia Type:  MAC    Note:  Disposition: Outpatient   Postop Pain Control: Uneventful            Sign Out: Well controlled pain   PONV: No   Neuro/Psych: Uneventful            Sign Out: Acceptable/Baseline neuro status   Airway/Respiratory: Uneventful            Sign Out: Acceptable/Baseline resp. status   CV/Hemodynamics: Uneventful            Sign Out: Acceptable CV status; No obvious hypovolemia; No obvious fluid overload   Other NRE: NONE   DID A NON-ROUTINE EVENT OCCUR? No           Last vitals:  Vitals Value Taken Time   /86 04/24/23 1020   Temp 36.3  C (97.3  F) 04/24/23 1020   Pulse 58 04/24/23 1020   Resp 14 04/24/23 1020   SpO2 98 % 04/24/23 1020       Electronically Signed By: Jakob Conner MD, MD  April 24, 2023  1:42 PM

## 2023-04-24 NOTE — ANESTHESIA PREPROCEDURE EVALUATION
Anesthesia Pre-Procedure Evaluation    Patient: Ivan D Welander   MRN: [id number] : 1980        Procedure : Procedure(s):  COLONOSCOPY          Past Medical History:   Diagnosis Date     Asymptomatic LV dysfunction 2012     Cancer (H)     Colon     Depressive disorder 2021     Thyroid disease     Acromegaly      Past Surgical History:   Procedure Laterality Date     APPENDECTOMY       COLONOSCOPY       GI SURGERY      sigmoid colectomy     HEAD & NECK SURGERY  2010    transphenoidal hypophysectomy, repeat 2016      No Known Allergies   Social History     Tobacco Use     Smoking status: Never     Smokeless tobacco: Never   Vaping Use     Vaping status: Not on file   Substance Use Topics     Alcohol use: Yes     Comment: Only so often      Wt Readings from Last 1 Encounters:   23 92.1 kg (203 lb)        Anesthesia Evaluation            ROS/MED HX  ENT/Pulmonary:  - neg pulmonary ROS     Neurologic:  - neg neurologic ROS     Cardiovascular:       METS/Exercise Tolerance:     Hematologic:       Musculoskeletal:       GI/Hepatic:     (+) bowel prep,     Renal/Genitourinary:       Endo:     (+) thyroid problem, hypothyroidism,     Psychiatric/Substance Use:       Infectious Disease:       Malignancy:   (+) Malignancy, History of GI.GI CA  Remission status post Surgery and Chemo.        Other:               OUTSIDE LABS:  CBC:   Lab Results   Component Value Date    WBC 6.0 2022    WBC 7.3 2021    HGB 12.9 (L) 2022    HGB 14.1 2021    HCT 38.2 (L) 2022    HCT 40.7 2021     2022     2021     BMP:   Lab Results   Component Value Date     2021     2020    POTASSIUM 3.6 2021    POTASSIUM 3.4 2020    CHLORIDE 104 2021    CHLORIDE 104 2020    CO2 28 2021    CO2 29 2020    BUN 10 2021    BUN 8 2020    CR 0.87 2021    CR 0.87 2020    GLC 73 2021    GLC  84 02/19/2020     COAGS: No results found for: PTT, INR, FIBR  POC: No results found for: BGM, HCG, HCGS  HEPATIC:   Lab Results   Component Value Date    ALBUMIN 3.7 02/19/2020    PROTTOTAL 7.1 02/19/2020    ALT 20 02/19/2020    AST 19 02/19/2020    ALKPHOS 52 02/19/2020    BILITOTAL 0.6 02/19/2020     OTHER:   Lab Results   Component Value Date    A1C 5.1 02/19/2020    KINSEY 8.8 05/27/2021    TSH 0.12 (L) 07/25/2022    T4 1.05 07/25/2022       Anesthesia Plan    ASA Status:  2      Anesthesia Type: MAC.     - Reason for MAC: immobility needed, straight local not clinically adequate              Consents    Anesthesia Plan(s) and associated risks, benefits, and realistic alternatives discussed. Questions answered and patient/representative(s) expressed understanding.     - Discussed: Risks, Benefits and Alternatives for BOTH SEDATION and the PROCEDURE were discussed     - Discussed with:  Patient      - Extended Intubation/Ventilatory Support Discussed: No.      - Patient is DNR/DNI Status: No    Use of blood products discussed: No .     Postoperative Care    Pain management: IV analgesics, Oral pain medications.   PONV prophylaxis: Ondansetron (or other 5HT-3)     Comments:                Jakob Conner MD, MD

## 2023-04-24 NOTE — H&P
Ivan D Welander  [id number]  male  42 year old      Reason for procedure/surgery: colonoscopy, history of colon cancer    Patient Active Problem List   Diagnosis     Hypopituitarism (H)     Acromegaly (H)     Malignant neoplasm of sigmoid colon (H)     Growth hormone deficiency (H)     Hypogonadism male     History of open sigmoidectomy     Pituitary adenoma (H)     Asymptomatic LV dysfunction     CARDIOVASCULAR SCREENING; LDL GOAL LESS THAN 160     Moderate episode of recurrent major depressive disorder (H)     History of colonic polyps     Suppression of immune system subtherapeutic (H)       Past Surgical History:    Past Surgical History:   Procedure Laterality Date     APPENDECTOMY       COLONOSCOPY       GI SURGERY  2011    sigmoid colectomy     HEAD & NECK SURGERY  2010    transphenoidal hypophysectomy, repeat 2016       Past Medical History:   Past Medical History:   Diagnosis Date     Asymptomatic LV dysfunction 08/30/2012     Cancer (H)     Colon     Depressive disorder 2011, 2021     Thyroid disease     Acromegaly       Social History:   Social History     Tobacco Use     Smoking status: Never     Smokeless tobacco: Never   Vaping Use     Vaping status: Not on file   Substance Use Topics     Alcohol use: Yes     Comment: Only so often       Family History:   Family History   Problem Relation Age of Onset     Rheumatoid Arthritis Mother      Other Cancer Mother         Nose/skin cancer     Colon Cancer Maternal Grandmother      Cerebrovascular Disease Paternal Grandmother      Coronary Artery Disease Paternal Grandfather      Rheumatoid Arthritis Other      Diabetes Other      Hypertension No family hx of      Hyperlipidemia No family hx of      Breast Cancer No family hx of      Prostate Cancer No family hx of      Depression No family hx of      Anxiety Disorder No family hx of      Mental Illness No family hx of      Substance Abuse No family hx of      Anesthesia Reaction No family hx of      Asthma No  "family hx of      Osteoporosis No family hx of      Genetic Disorder No family hx of      Thyroid Disease No family hx of      Obesity No family hx of      Unknown/Adopted No family hx of        Allergies: No Known Allergies    Active Medications:   Current Outpatient Medications   Medication Sig Dispense Refill     dexamethasone (DECADRON) 1 MG tablet 0.5 mg daily plus an additional 5 doses per month as needed 105 tablet 3     dexamethasone (DECADRON) 1 MG tablet 0.5 mg daily plus an additional 5 doses per month as needed 90 tablet 2     insulin pen needle (PENTIPS) 31G X 8 MM miscellaneous For Somatropin (NUTROPIN injection daily. Subcutaneous 100 each 1     levothyroxine (SYNTHROID/LEVOTHROID) 125 MCG tablet Take 1 tablet (125 mcg) by mouth daily 90 tablet 5     sertraline (ZOLOFT) 25 MG tablet TAKE 1 TABLET(25 MG) BY MOUTH DAILY 90 tablet 1     sildenafil (VIAGRA) 50 MG tablet Take 1 tablet (50 mg) by mouth daily as needed (as directed) 50 tablet 3     Somatropin (NORDITROPIN FLEXPRO) 5 MG/1.5ML SOPN Inject 0.5 mg Subcutaneous daily 4.5 mL 5     somatropin (NUTROPIN AQ NUSPIN 10) 10 MG/2ML SOPN PEN injection INJECT 0.5 MG UNDER THE SKIN ONCE DAILY 2 mL 5     Somatropin (NUTROPIN AQ NUSPIN 5) 5 MG/2ML SOPN Inject 0.5 mg Subcutaneous daily 6 mL 2     syringe/needle, disp, (B-D SYRINGE/NEEDLE) 23G X 1\" 3 ML MISC USE 1 EVERY WEEK 15 each 3     syringe/needle, disp, 23G X 1\" 3 ML MISC Use 1x weekly with testosterone injection. 13 each 0     testosterone cypionate (DEPOTESTOSTERONE) 200 MG/ML injection Inject 0.5 mLs (100 mg) into the muscle once a week 6 mL 5       Systemic Review:   CONSTITUTIONAL: NEGATIVE for fever, chills, change in weight  ENT/MOUTH: NEGATIVE for ear, mouth and throat problems  RESP: NEGATIVE for significant cough or SOB  CV: NEGATIVE for chest pain, palpitations or peripheral edema    Physical Examination:   Vital Signs: /84 (BP Location: Right arm)   Pulse 72   Temp 97  F (36.1  C) " "(Temporal)   Resp 16   Ht 1.867 m (6' 1.5\")   Wt 92.1 kg (203 lb)   SpO2 97%   BMI 26.42 kg/m    GENERAL: healthy, alert and no distress  NECK: no adenopathy, no asymmetry, masses, or scars  RESP: lungs clear to auscultation - no rales, rhonchi or wheezes  CV: regular rate and rhythm, normal S1 S2, no S3 or S4, no murmur, click or rub, no peripheral edema and peripheral pulses strong  ABDOMEN: soft, nontender, no hepatosplenomegaly, no masses and bowel sounds normal  MS: no gross musculoskeletal defects noted, no edema    Plan: Appropriate to proceed as scheduled.      Ovi Lepe DO  4/24/2023    PCP:  Samra Hidalgo    "

## 2023-04-25 LAB
PATH REPORT.COMMENTS IMP SPEC: NORMAL
PATH REPORT.COMMENTS IMP SPEC: NORMAL
PATH REPORT.FINAL DX SPEC: NORMAL
PATH REPORT.GROSS SPEC: NORMAL
PATH REPORT.MICROSCOPIC SPEC OTHER STN: NORMAL
PATH REPORT.RELEVANT HX SPEC: NORMAL
PHOTO IMAGE: NORMAL

## 2023-04-25 NOTE — TELEPHONE ENCOUNTER
Dr. Hidalgo --    Please review and advise.     I attempted ordering the novavax booster. It did not come up.     Writer responded via [employer].    Debra Rios, ANA LAURAN ROCIO  [facility]

## 2023-04-28 ENCOUNTER — TELEPHONE (OUTPATIENT)
Dept: GASTROENTEROLOGY | Facility: CLINIC | Age: 43
End: 2023-04-28
Payer: COMMERCIAL

## 2023-04-28 NOTE — TELEPHONE ENCOUNTER
Called PT and left a VM to get him scheduled. Per staff message patient can be seen with a different provider.

## 2023-04-28 NOTE — TELEPHONE ENCOUNTER
----- Message from Lakhwinder Ahuja RN sent at 2023  2:25 PM CDT -----  Regarding: RE:  Referral  Eddie Baig,    I replaced the order for the colonoscopy so he should be good for a year. This pt can be with a different provider if there is availability sooner. Moderate sedation would be okay for this pt if they would like (I just replaced the order for MAC).    He does not necessarily need to be seen by Dr. Lepe (if he wants a follow up appointment we can do that but pt would benefit from seeing our general GI providers instead of Dr. Lepe as Dr. Lepe has moved to only esophageal patients). I can coordinate if pt wants to do a follow up appointment.    Thank you!  Lakhwinder  ----- Message -----  From: Jovanni Mckeon  Sent: 2023   2:12 PM CDT  To: Lakhwinder Ahuja RN; January Morse RN  Subject:  Referral                                 Hello,    I received a call from this patient to be rescheduled from his appointment on  w/ MAC however, there are no appointments available it appears in April or May with MAC sedation for Dr. Lepe and also it appears the referral  in january this year.    Can you send a new referral over for this patient? Also the patient is asking if moderate sedation is ok for procedure? Lastly they asked if they would have to be seen by Dr Lepe as he is trying to reschedule for a earlier appointment.    Thank you,    Jovanni

## 2023-05-04 ENCOUNTER — PATIENT OUTREACH (OUTPATIENT)
Dept: CARE COORDINATION | Facility: CLINIC | Age: 43
End: 2023-05-04
Payer: COMMERCIAL

## 2023-06-01 ENCOUNTER — TELEPHONE (OUTPATIENT)
Dept: ENDOCRINOLOGY | Facility: CLINIC | Age: 43
End: 2023-06-01
Payer: COMMERCIAL

## 2023-06-01 NOTE — TELEPHONE ENCOUNTER
PA Initiation    Medication: NUTROPIN AQ NUSPIN 10 10 MG/2ML SC SOPN  Insurance Company: [employer] ([facility]) - Phone [phone] Fax [phone]  Pharmacy Filling the Rx: [facility] - [place], [place] - [address]  Filling Pharmacy Phone:    Filling Pharmacy Fax:    Start Date: 6/1/2023    [id number]

## 2023-06-05 NOTE — TELEPHONE ENCOUNTER
Faxed pa approval letter to [employer] for case update [phone] 06/05/2023 142pm cover plus 7 pages

## 2023-07-08 ENCOUNTER — HEALTH MAINTENANCE LETTER (OUTPATIENT)
Age: 43
End: 2023-07-08

## 2023-07-27 DIAGNOSIS — E23.0 HYPOPITUITARISM (H): ICD-10-CM

## 2023-07-31 NOTE — TELEPHONE ENCOUNTER
levothyroxine (SYNTHROID/LEVOTHROID) 125 MCG tablet      Last Written Prescription Date:  6/29/2022  Last Fill Quantity: 90,   # refills: 5  Last Office Visit : 2/1/2023  Future Office visit:  none    Routing refill request to provider for review/approval because:  Failed medication protocol: lab   - >12 months last TSH  - TSH done 7/25/2022 (L)  *future order is in chart    TSH   Date Value Ref Range Status   07/25/2022 0.12 (L) 0.40 - 4.00 mU/L Final   05/14/2021 0.07 (L) 0.40 - 4.00 mU/L Final

## 2023-08-01 RX ORDER — LEVOTHYROXINE SODIUM 125 UG/1
125 TABLET ORAL DAILY
Qty: 90 TABLET | Refills: 3 | Status: SHIPPED | OUTPATIENT
Start: 2023-08-01 | End: 2024-07-25

## 2023-08-16 DIAGNOSIS — E23.0 GROWTH HORMONE DEFICIENCY (H): ICD-10-CM

## 2023-08-17 RX ORDER — SOMATROPIN 10 MG/2ML
INJECTION, SOLUTION SUBCUTANEOUS
Qty: 2 ML | Refills: 5 | Status: SHIPPED | OUTPATIENT
Start: 2023-08-17 | End: 2023-12-21

## 2023-08-17 NOTE — TELEPHONE ENCOUNTER
somatropin (NUTROPIN AQ NUSPIN 10) 10 MG/2ML SOPN PEN injection     2 mL 5 2/15/2023       2/1/2023  [facility] [place]      Damaris Tejada MD  Endocrinology, Diabetes, and Metabolism       Routed because: controlled

## 2023-08-31 ENCOUNTER — E-VISIT (OUTPATIENT)
Dept: FAMILY MEDICINE | Facility: CLINIC | Age: 43
End: 2023-08-31
Payer: COMMERCIAL

## 2023-08-31 DIAGNOSIS — J02.9 SORE THROAT: Primary | ICD-10-CM

## 2023-08-31 PROCEDURE — 99207 PR NO CHARGE LOS: CPT | Performed by: PHYSICIAN ASSISTANT

## 2023-09-01 ENCOUNTER — APPOINTMENT (OUTPATIENT)
Dept: LAB | Facility: CLINIC | Age: 43
End: 2023-09-01
Attending: PHYSICIAN ASSISTANT
Payer: COMMERCIAL

## 2023-09-01 LAB
DEPRECATED S PYO AG THROAT QL EIA: NEGATIVE
GROUP A STREP BY PCR: NOT DETECTED

## 2023-09-01 PROCEDURE — 87651 STREP A DNA AMP PROBE: CPT | Performed by: PHYSICIAN ASSISTANT

## 2023-09-01 NOTE — PATIENT INSTRUCTIONS
Thank you for choosing us for your care. Given your symptoms, I would like you to do a lab-only visit to determine what is causing them.  I have placed the orders.  Please schedule an appointment with the lab right here in [place], or call [phone].  I will let you know when the results are back and next steps to take.

## 2023-09-02 ENCOUNTER — VIRTUAL VISIT (OUTPATIENT)
Dept: URGENT CARE | Facility: CLINIC | Age: 43
End: 2023-09-02
Payer: COMMERCIAL

## 2023-09-02 DIAGNOSIS — R07.0 THROAT PAIN: Primary | ICD-10-CM

## 2023-09-02 PROCEDURE — 99213 OFFICE O/P EST LOW 20 MIN: CPT | Mod: VID

## 2023-09-02 NOTE — PROGRESS NOTES
Aelx is a 43 year old male who presents for a billable video visit.    ASSESSMENT/PLAN:  Diagnoses and all orders for this visit:    Throat pain  -     Cancel: Streptococcus A Rapid Screen w/Reflex to PCR - Clinic Collect  -     Mononucleosis screen; Future  -     Streptococcus A Rapid Screen w/Reflex to PCR - Clinic Collect; Future    Labs ordered. Patient advised to make a lab appointment at any [facility]. Advised patient that antibiotics are not indicated since strep test is negative and patient is also being seen for a virtual visit.    SUBJECTIVE:  Patient reports sore throat which started 5 days ago. Patient`s daughter was treated for strep. He does not have other symptoms. No treatment tried. He is wondering if he can be treated for strep due to exposure since this has been done in the past. Strep test done one day ago was negative. Patient is wondering if the sample collection was done right. COVID-19 test PCR came back negative.`     ROS: Pertinent ROS neg other than the symptoms noted above in the HPI.     OBJECTIVE:  Vitals not done due to this being a virtual visit    GENERAL: healthy, alert and no distress  EYES: Eyes grossly normal to inspection,conjunctivae and sclerae normal  RESP: Able to speak in complete sentences, no audible wheeze or cough  SKIN: no suspicious lesions or rashes  NEURO: mentation intact and speech normal  PSYCH: mentation appears normal, affect normal/bright    Video-Visit Details    Type of service:  Video Visit  Video Start Time: 1:03  Video End Time: 1:11    Originating Location: Home    Distant Location:  [facility] VIRTUAL [facility]RGENT [facility]ARE     Platform used for Video Visit: Erlinda Castillo PA-C

## 2023-09-06 DIAGNOSIS — F33.1 MODERATE EPISODE OF RECURRENT MAJOR DEPRESSIVE DISORDER (H): ICD-10-CM

## 2023-09-06 RX ORDER — SERTRALINE HYDROCHLORIDE 25 MG/1
TABLET, FILM COATED ORAL
Qty: 90 TABLET | Refills: 1 | Status: SHIPPED | OUTPATIENT
Start: 2023-09-06 | End: 2023-10-27

## 2023-10-27 ENCOUNTER — OFFICE VISIT (OUTPATIENT)
Dept: FAMILY MEDICINE | Facility: CLINIC | Age: 43
End: 2023-10-27
Payer: COMMERCIAL

## 2023-10-27 VITALS
SYSTOLIC BLOOD PRESSURE: 112 MMHG | WEIGHT: 206 LBS | BODY MASS INDEX: 27.3 KG/M2 | HEART RATE: 82 BPM | HEIGHT: 73 IN | TEMPERATURE: 98.1 F | DIASTOLIC BLOOD PRESSURE: 71 MMHG | OXYGEN SATURATION: 99 % | RESPIRATION RATE: 16 BRPM

## 2023-10-27 DIAGNOSIS — Z23 NEED FOR PROPHYLACTIC VACCINATION AND INOCULATION AGAINST INFLUENZA: ICD-10-CM

## 2023-10-27 DIAGNOSIS — E23.0 HYPOPITUITARISM (H): ICD-10-CM

## 2023-10-27 DIAGNOSIS — Z00.00 ROUTINE GENERAL MEDICAL EXAMINATION AT A HEALTH CARE FACILITY: Primary | ICD-10-CM

## 2023-10-27 DIAGNOSIS — D89.89 SUPPRESSION OF IMMUNE SYSTEM SUBTHERAPEUTIC (H): ICD-10-CM

## 2023-10-27 DIAGNOSIS — Z13.6 CARDIOVASCULAR SCREENING; LDL GOAL LESS THAN 160: ICD-10-CM

## 2023-10-27 PROBLEM — C18.7 MALIGNANT NEOPLASM OF SIGMOID COLON (H): Status: RESOLVED | Noted: 2018-07-12 | Resolved: 2023-10-27

## 2023-10-27 PROCEDURE — 90471 IMMUNIZATION ADMIN: CPT | Performed by: FAMILY MEDICINE

## 2023-10-27 PROCEDURE — 90686 IIV4 VACC NO PRSV 0.5 ML IM: CPT | Performed by: FAMILY MEDICINE

## 2023-10-27 PROCEDURE — 90472 IMMUNIZATION ADMIN EACH ADD: CPT | Performed by: FAMILY MEDICINE

## 2023-10-27 PROCEDURE — 99396 PREV VISIT EST AGE 40-64: CPT | Mod: 25 | Performed by: FAMILY MEDICINE

## 2023-10-27 PROCEDURE — 90636 HEP A/HEP B VACC ADULT IM: CPT | Performed by: FAMILY MEDICINE

## 2023-10-27 RX ORDER — DEXAMETHASONE 1 MG
TABLET ORAL
Qty: 105 TABLET | Refills: 3 | Status: CANCELLED | OUTPATIENT
Start: 2023-10-27

## 2023-10-27 ASSESSMENT — ENCOUNTER SYMPTOMS
FREQUENCY: 0
EYE PAIN: 0
CONSTIPATION: 0
PALPITATIONS: 0
NERVOUS/ANXIOUS: 0
SHORTNESS OF BREATH: 0
HEMATOCHEZIA: 0
COUGH: 0
JOINT SWELLING: 0
DYSURIA: 0
FEVER: 0
WEAKNESS: 0
PARESTHESIAS: 0
ARTHRALGIAS: 0
HEMATURIA: 0
HEARTBURN: 0
HEADACHES: 0
MYALGIAS: 0
ABDOMINAL PAIN: 0
DIARRHEA: 0
SORE THROAT: 1
CHILLS: 0
NAUSEA: 0
DIZZINESS: 0

## 2023-10-27 ASSESSMENT — PATIENT HEALTH QUESTIONNAIRE - PHQ9
SUM OF ALL RESPONSES TO PHQ QUESTIONS 1-9: 4
SUM OF ALL RESPONSES TO PHQ QUESTIONS 1-9: 4
10. IF YOU CHECKED OFF ANY PROBLEMS, HOW DIFFICULT HAVE THESE PROBLEMS MADE IT FOR YOU TO DO YOUR WORK, TAKE CARE OF THINGS AT HOME, OR GET ALONG WITH OTHER PEOPLE: NOT DIFFICULT AT ALL

## 2023-10-27 NOTE — PROGRESS NOTES
SUBJECTIVE:   CC: Alex is an 43 year old who presents for preventative health visit.       10/27/2023    10:04 AM   Additional Questions   Roomed by Leslie     Healthy Habits:     Getting at least 3 servings of Calcium per day:  Yes    Bi-annual eye exam:  Yes    Dental care twice a year:  NO    Sleep apnea or symptoms of sleep apnea:  None    Diet:  Regular (no restrictions)    Frequency of exercise:  1 day/week    Duration of exercise:  15-30 minutes    Taking medications regularly:  No    Barriers to taking medications:  Problems remembering to take them    Medication side effects:  None    Additional concerns today:  Yes      Today's PHQ-9 Score:       10/27/2023     9:57 AM   PHQ-9 SCORE   PHQ-9 Total Score MyChart 4 (Minimal depression)   PHQ-9 Total Score 4         Social History     Tobacco Use    Smoking status: Never     Passive exposure: Past (passive exposure as a child)    Smokeless tobacco: Never   Substance Use Topics    Alcohol use: Yes     Comment: Only so often             10/27/2023     9:59 AM   Alcohol Use   Prescreen: >3 drinks/day or >7 drinks/week? No       Last PSA:   PSA Tumor Marker   Date Value Ref Range Status   07/25/2022 0.47 0.00 - 4.00 ug/L Final       Reviewed orders with patient. Reviewed health maintenance and updated orders accordingly - Yes  BP Readings from Last 3 Encounters:   10/27/23 112/71   04/24/23 123/86   05/06/22 113/80    Wt Readings from Last 3 Encounters:   10/27/23 93.4 kg (206 lb)   04/24/23 92.1 kg (203 lb)   05/06/22 92.1 kg (203 lb)                  Patient Active Problem List   Diagnosis    Hypopituitarism (H24)    Acromegaly (H)    Growth hormone deficiency (H24)    Hypogonadism male    History of open sigmoidectomy    Pituitary adenoma (H)    Asymptomatic LV dysfunction    CARDIOVASCULAR SCREENING; LDL GOAL LESS THAN 160    History of colonic polyps    Suppression of immune system subtherapeutic (H24)     Past Surgical History:   Procedure Laterality Date     APPENDECTOMY      COLONOSCOPY      COLONOSCOPY N/A 4/24/2023    Procedure: COLONOSCOPY, WITH POLYPECTOMY AND BIOPSY;  Surgeon: Ovi Lepe DO;  Location: [facility] OR    GI SURGERY  2011    sigmoid colectomy    HEAD & NECK SURGERY  2010    transphenoidal hypophysectomy, repeat 2016       Social History     Tobacco Use    Smoking status: Never     Passive exposure: Past (passive exposure as a child)    Smokeless tobacco: Never   Substance Use Topics    Alcohol use: Yes     Comment: Only so often     Family History   Problem Relation Age of Onset    Rheumatoid Arthritis Mother     Other Cancer Mother         Nose/skin cancer    Colon Cancer Maternal Grandmother     Cerebrovascular Disease Paternal Grandmother     Coronary Artery Disease Paternal Grandfather     Rheumatoid Arthritis Other     Diabetes Other     Hypertension No family hx of     Hyperlipidemia No family hx of     Breast Cancer No family hx of     Prostate Cancer No family hx of     Depression No family hx of     Anxiety Disorder No family hx of     Mental Illness No family hx of     Substance Abuse No family hx of     Anesthesia Reaction No family hx of     Asthma No family hx of     Osteoporosis No family hx of     Genetic Disorder No family hx of     Thyroid Disease No family hx of     Obesity No family hx of     Unknown/Adopted No family hx of          Current Outpatient Medications   Medication Sig Dispense Refill    dexamethasone (DECADRON) 1 MG tablet 0.5 mg daily plus an additional 5 doses per month as needed 90 tablet 2    insulin pen needle (PENTIPS) 31G X 8 MM miscellaneous For Somatropin (NUTROPIN injection daily. Subcutaneous 100 each 1    levothyroxine (SYNTHROID/LEVOTHROID) 125 MCG tablet Take 1 tablet (125 mcg) by mouth daily 90 tablet 3    sildenafil (VIAGRA) 50 MG tablet Take 1 tablet (50 mg) by mouth daily as needed (as directed) 50 tablet 3    Somatropin (NORDITROPIN FLEXPRO) 5 MG/1.5ML SOPN Inject 0.5 mg Subcutaneous daily 4.5 mL 5     "somatropin (NUTROPIN AQ NUSPIN 10) 10 MG/2ML SOPN PEN injection INJECT 0.5 MG UNDER THE SKIN ONCE DAILY 2 mL 5    syringe/needle, disp, (B-D SYRINGE/NEEDLE) 23G X 1\" 3 ML MISC USE 1 EVERY WEEK 15 each 3    syringe/needle, disp, 23G X 1\" 3 ML MISC Use 1x weekly with testosterone injection. 13 each 0    testosterone cypionate (DEPOTESTOSTERONE) 200 MG/ML injection Inject 0.5 mLs (100 mg) into the muscle once a week 6 mL 5    dexamethasone (DECADRON) 1 MG tablet 0.5 mg daily plus an additional 5 doses per month as needed 105 tablet 3    Somatropin (NUTROPIN AQ NUSPIN 5) 5 MG/2ML SOPN Inject 0.5 mg Subcutaneous daily 6 mL 2     No Known Allergies  Recent Labs   Lab Test 07/25/22  0920 05/27/21  1211 05/14/21  0907 02/19/20  0928 10/02/18  1409   A1C  --   --   --  5.1  --    * 195*  --  140*  --    HDL 81 78  --  77  --    TRIG 87 86  --  104  --    ALT  --   --   --  20 23   CR  --  0.87  --  0.87 0.95   GFRESTIMATED  --  >90  --  >90 88   GFRESTBLACK  --  >90  --  >90 >90   POTASSIUM  --  3.6  --  3.4 3.6   TSH 0.12*  --  0.07* 0.03* 0.01*        Reviewed and updated as needed this visit by clinical staff    Allergies  Meds              Reviewed and updated as needed this visit by Provider                 Past Medical History:   Diagnosis Date    Asymptomatic LV dysfunction 08/30/2012    Cancer (H)     Colon    Depressive disorder 2011, 2021    Thyroid disease     Acromegaly      Past Surgical History:   Procedure Laterality Date    APPENDECTOMY      COLONOSCOPY      COLONOSCOPY N/A 4/24/2023    Procedure: COLONOSCOPY, WITH POLYPECTOMY AND BIOPSY;  Surgeon: Ovi Lepe DO;  Location: [facility] OR    GI SURGERY  2011    sigmoid colectomy    HEAD & NECK SURGERY  2010    transphenoidal hypophysectomy, repeat 2016     OB History   No obstetric history on file.       Review of Systems   Constitutional:  Negative for chills and fever.   HENT:  Positive for sore throat. Negative for congestion, ear pain and hearing " "loss.    Eyes:  Negative for pain and visual disturbance.   Respiratory:  Negative for cough and shortness of breath.    Cardiovascular:  Negative for chest pain, palpitations and peripheral edema.   Gastrointestinal:  Negative for abdominal pain, constipation, diarrhea, heartburn, hematochezia and nausea.   Genitourinary:  Negative for dysuria, frequency, genital sores, hematuria and urgency.   Musculoskeletal:  Negative for arthralgias, joint swelling and myalgias.   Skin:  Negative for rash.   Neurological:  Negative for dizziness, weakness, headaches and paresthesias.   Psychiatric/Behavioral:  Negative for mood changes. The patient is not nervous/anxious.      OBJECTIVE:   /71   Pulse 82   Temp 98.1  F (36.7  C) (Oral)   Resp 16   Ht 1.861 m (6' 1.25\")   Wt 93.4 kg (206 lb)   SpO2 99%   BMI 26.99 kg/m      Physical Exam  GENERAL: healthy, alert and no distress  EYES: Eyes grossly normal to inspection, PERRL and conjunctivae and sclerae normal  HENT: ear canals and TM's normal, nose and mouth without ulcers or lesions  NECK: no adenopathy, no asymmetry, masses, or scars and thyroid normal to palpation  RESP: lungs clear to auscultation - no rales, rhonchi or wheezes  CV: regular rate and rhythm, normal S1 S2, no S3 or S4, no murmur, click or rub, no peripheral edema and peripheral pulses strong  MS: no gross musculoskeletal defects noted, no edema  SKIN: no suspicious lesions or rashes  NEURO: Normal strength and tone, mentation intact and speech normal  PSYCH: mentation appears normal, affect normal/bright    ASSESSMENT/PLAN:   (Z00.00) Routine general medical examination at a health care facility  (primary encounter diagnosis)  routine    (Z23) Need for prophylactic vaccination and inoculation against influenza  Done today    (E23.0) Hypopituitarism (H24)  Comment: - transphenoidal hypophysectomy performed in 2009 for acromegaly Rx  -gamma knife pituitary radiosurgery in 2010  - second " "hypophysectomy 2016    Plan: managed by specialist, follow up as scheduled Feb 2024    (Z13.6) CARDIOVASCULAR SCREENING; LDL GOAL LESS THAN 160  Comment:   LDL Cholesterol Calculated   Date Value Ref Range Status   07/25/2022 162 (H) <=100 mg/dL Final   05/27/2021 195 (H) <100 mg/dL Final     Comment:     Above desirable:  100-129 mg/dl  Borderline High:  130-159 mg/dL  High:             160-189 mg/dL  Very high:       >189 mg/dl        Plan: Lipid panel reflex to direct LDL Fasting        Previously slightly elevated, will check fasting labs, Will fu as indicated.     (D89.89) Suppression of immune system subtherapeutic (H24)  Comment: on chronic steroids  Plan: immunize as recommended      COUNSELING:   Reviewed preventive health counseling, as reflected in patient instructions      BMI:   Estimated body mass index is 26.99 kg/m  as calculated from the following:    Height as of this encounter: 1.861 m (6' 1.25\").    Weight as of this encounter: 93.4 kg (206 lb).   Weight management plan: see AVS      He reports that he has never smoked. He has been exposed to tobacco smoke. He has never used smokeless tobacco.            Samra Hidalgo MD  [facility]  "

## 2023-10-27 NOTE — PATIENT INSTRUCTIONS
Post nasal drainage:  Consider using neti pot daily, steroid nasal spray (available over the counter), or Afrin for 3 days    Preventive Health Recommendations  Male Ages 40 to 49    Yearly exam:             See your health care provider every year in order to  o   Review health changes.   o   Discuss preventive care.    o   Review your medicines if your doctor has prescribed any.  You should be tested each year for STDs (sexually transmitted diseases) if you re at risk.   Have a cholesterol test every 5 years.   Have a colonoscopy (test for colon cancer) beginning at age 45.  Ask your provider about other options like a yearly FIT test or Cologuard test every 3 years (stool tests)   After age 45, have a diabetes test (fasting glucose). If you are at risk for diabetes, you should have this test every 3 years.    Talk with your health care provider about whether or not a prostate cancer screening test (PSA) is right for you.    Shots: Get a flu shot each year. Get a tetanus shot every 10 years.     Nutrition:  Eat at least 5 servings of fruits and vegetables daily.   Eat whole-grain bread, whole-wheat pasta and brown rice instead of white grains and rice.   Get adequate Calcium and Vitamin D.     Lifestyle  Exercise for at least 150 minutes a week (30 minutes a day, 5 days a week). This will help you control your weight and prevent disease.   Limit alcohol to one drink per day.   No smoking.   Wear sunscreen to prevent skin cancer.   See your dentist every six months for an exam and cleaning.

## 2023-10-27 NOTE — NURSING NOTE
Prior to immunization administration, verified patients identity using patient s name and date of birth. Please see Immunization Activity for additional information.     Screening Questionnaire for Adult Immunization    Are you sick today?   No   Do you have allergies to medications, food, a vaccine component or latex?   No   Have you ever had a serious reaction after receiving a vaccination?   No   Do you have a long-term health problem with heart, lung, kidney, or metabolic disease (e.g., diabetes), asthma, a blood disorder, no spleen, complement component deficiency, a cochlear implant, or a spinal fluid leak?  Are you on long-term aspirin therapy?   No   Do you have cancer, leukemia, HIV/AIDS, or any other immune system problem?   No   Do you have a parent, brother, or sister with an immune system problem?   No   In the past 3 months, have you taken medications that affect  your immune system, such as prednisone, other steroids, or anticancer drugs; drugs for the treatment of rheumatoid arthritis, Crohn s disease, or psoriasis; or have you had radiation treatments?   No   Have you had a seizure, or a brain or other nervous system problem?   No   During the past year, have you received a transfusion of blood or blood    products, or been given immune (gamma) globulin or antiviral drug?   No   For women: Are you pregnant or is there a chance you could become       pregnant during the next month?   No   Have you received any vaccinations in the past 4 weeks?   No     Immunization questionnaire answers were all negative.              Patient instructed to remain in clinic for 15 minutes afterwards, and to report any adverse reactions.     Screening performed by Marcial Gilmore MA on 10/27/2023 at 11:05 AM.

## 2023-11-10 ENCOUNTER — LAB (OUTPATIENT)
Dept: LAB | Facility: CLINIC | Age: 43
End: 2023-11-10
Attending: PHYSICIAN ASSISTANT
Payer: COMMERCIAL

## 2023-11-10 DIAGNOSIS — Z13.6 CARDIOVASCULAR SCREENING; LDL GOAL LESS THAN 160: ICD-10-CM

## 2023-11-10 DIAGNOSIS — R07.0 THROAT PAIN: ICD-10-CM

## 2023-11-10 DIAGNOSIS — E22.0 ACROMEGALY (H): ICD-10-CM

## 2023-11-10 LAB
ALBUMIN SERPL BCG-MCNC: 4.3 G/DL (ref 3.5–5.2)
ALP SERPL-CCNC: 49 U/L (ref 40–129)
ALT SERPL W P-5'-P-CCNC: 11 U/L (ref 0–70)
ANION GAP SERPL CALCULATED.3IONS-SCNC: 8 MMOL/L (ref 7–15)
AST SERPL W P-5'-P-CCNC: 22 U/L (ref 0–45)
BILIRUB SERPL-MCNC: 0.6 MG/DL
BUN SERPL-MCNC: 12 MG/DL (ref 6–20)
CALCIUM SERPL-MCNC: 8.8 MG/DL (ref 8.6–10)
CHLORIDE SERPL-SCNC: 101 MMOL/L (ref 98–107)
CHOLEST SERPL-MCNC: 262 MG/DL
CREAT SERPL-MCNC: 1.02 MG/DL (ref 0.67–1.17)
DEPRECATED HCO3 PLAS-SCNC: 30 MMOL/L (ref 22–29)
DEPRECATED S PYO AG THROAT QL EIA: NEGATIVE
EGFRCR SERPLBLD CKD-EPI 2021: >90 ML/MIN/1.73M2
GLUCOSE SERPL-MCNC: 78 MG/DL (ref 70–99)
GROUP A STREP BY PCR: NOT DETECTED
HDLC SERPL-MCNC: 62 MG/DL
LDLC SERPL CALC-MCNC: 184 MG/DL
MONOCYTES NFR BLD AUTO: NEGATIVE %
NONHDLC SERPL-MCNC: 200 MG/DL
POTASSIUM SERPL-SCNC: 3.5 MMOL/L (ref 3.4–5.3)
PROT SERPL-MCNC: 7 G/DL (ref 6.4–8.3)
SODIUM SERPL-SCNC: 139 MMOL/L (ref 135–145)
T4 FREE SERPL-MCNC: 1.39 NG/DL (ref 0.9–1.7)
TRIGL SERPL-MCNC: 81 MG/DL
TSH SERPL DL<=0.005 MIU/L-ACNC: 0.01 UIU/ML (ref 0.3–4.2)

## 2023-11-10 PROCEDURE — 87651 STREP A DNA AMP PROBE: CPT | Performed by: PHYSICIAN ASSISTANT

## 2023-11-10 PROCEDURE — 86308 HETEROPHILE ANTIBODY SCREEN: CPT | Performed by: PHYSICIAN ASSISTANT

## 2023-11-10 PROCEDURE — 99000 SPECIMEN HANDLING OFFICE-LAB: CPT | Performed by: PATHOLOGY

## 2023-11-10 PROCEDURE — 80061 LIPID PANEL: CPT | Performed by: PATHOLOGY

## 2023-11-10 PROCEDURE — 80053 COMPREHEN METABOLIC PANEL: CPT | Performed by: PATHOLOGY

## 2023-11-10 PROCEDURE — 84403 ASSAY OF TOTAL TESTOSTERONE: CPT | Performed by: INTERNAL MEDICINE

## 2023-11-10 PROCEDURE — 84439 ASSAY OF FREE THYROXINE: CPT | Performed by: PATHOLOGY

## 2023-11-10 PROCEDURE — 36415 COLL VENOUS BLD VENIPUNCTURE: CPT | Performed by: PATHOLOGY

## 2023-11-10 PROCEDURE — 84443 ASSAY THYROID STIM HORMONE: CPT | Performed by: PATHOLOGY

## 2023-11-10 PROCEDURE — 84305 ASSAY OF SOMATOMEDIN: CPT | Performed by: INTERNAL MEDICINE

## 2023-11-11 LAB — TESTOST SERPL-MCNC: 833 NG/DL (ref 240–950)

## 2023-11-14 LAB — IGF-I BLD-MCNC: 151 NG/ML (ref 78–233)

## 2023-11-14 NOTE — RESULT ENCOUNTER NOTE
Thank you for getting your cholesterol checked! It looks about the same as last year. Your total cholesterol and LDL cholesterol are elevated. Your triglycerides and HDL are great.    Desired or goal levels are:  CHOLESTEROL: Desirable is less than 200.  HDL (Good Cholesterol): Desirable is greater than 40 in men and greater than 50 in women.  LDL (Bad Cholesterol): Desirable is less than 160  TRIGLYCERIDES: Desirable is less than 150.    As you may know, abnormal cholesterol is one factor that increases your risk for heart disease and stroke. You can improve your cholesterol by controlling the amount and type of fat you eat and by increasing your daily activity level.    Here are some ways to improve your nutrition (adapted from the American Academy of Family [employer] handout):  Eat less fat (especially butter, Crisco and other saturated fats)  Buy lean cuts of meat; reduce your portions of red meat or substitute poultry or fish, or avoid meat altogether.  Use skim milk and low-fat dairy products  Eat no more than 4 egg yolks per week  Avoid fried or fast foods that are high in fat  Eat more fruits and vegetables, trying to make your plate of food at least half non-starchy vegetables.      If you have any questions, please contact the clinic or schedule an appointment with me, thank you!      Sincerely,  Dr. Samra Hidalgo MD  11/14/2023

## 2023-11-15 ENCOUNTER — MYC MEDICAL ADVICE (OUTPATIENT)
Dept: ENDOCRINOLOGY | Facility: CLINIC | Age: 43
End: 2023-11-15
Payer: COMMERCIAL

## 2023-11-15 DIAGNOSIS — E23.0 HYPOPITUITARISM (H): ICD-10-CM

## 2023-11-15 RX ORDER — TESTOSTERONE CYPIONATE 200 MG/ML
INJECTION, SOLUTION INTRAMUSCULAR
Qty: 6 ML | Refills: 5 | Status: SHIPPED | OUTPATIENT
Start: 2023-11-15 | End: 2023-11-20

## 2023-11-15 NOTE — TELEPHONE ENCOUNTER
testosterone cypionate (DEPOTESTOSTERONE) 200 MG/ML injection     6 mL 5 4/3/2023           2/1/2023  [facility] Damaris Vuong MD  Endocrinology, Diabetes, and Metabolism    Nv:  2/7/24       Routed because:  controlled    Androgen Agents Mptnzd24/15/2023 10:34 AM   Protocol Details HCT less than 54% on file within past 12 mos    Serum PSA on file within past 12 mos    Refills for this classification require provider review    Recent (6 mo) or future (30 days) visit within the authorizing provider's specialty

## 2023-11-20 RX ORDER — TESTOSTERONE CYPIONATE 200 MG/ML
50 INJECTION, SOLUTION INTRAMUSCULAR WEEKLY
Qty: 6 ML | Refills: 5 | Status: SHIPPED | OUTPATIENT
Start: 2023-11-20 | End: 2024-05-24

## 2023-11-22 ENCOUNTER — NURSE TRIAGE (OUTPATIENT)
Dept: FAMILY MEDICINE | Facility: CLINIC | Age: 43
End: 2023-11-22
Payer: COMMERCIAL

## 2023-11-22 NOTE — TELEPHONE ENCOUNTER
Nurse Triage SBAR    Is this a 2nd Level Triage? YES, LICENSED PRACTITIONER REVIEW IS REQUIRED    Situation: minor covid sx started today, sore throat and achey, home positive test today    Background: high risk due to health hx, on daily steroid    Assessment: ok to wait for rx tomorrow ? I was able to only find appt tomorrow for virtual visit through     Protocol Recommended Disposition:   Discuss With PCP And Callback By Nurse Within 1 Hour    Recommendation: ok to wait for tomorrow?     Routed to provider    Does the patient meet one of the following criteria for ADS visit consideration? 16+ years old, with an [facility] PCP     TIP  Providers, please consider if this condition is appropriate for management at one of our Acute and Diagnostic Services sites.     If patient is a good candidate, please use dotphrase <dot>triageresponse and select Refer to ADS to document.

## 2023-11-23 ENCOUNTER — VIRTUAL VISIT (OUTPATIENT)
Dept: URGENT CARE | Facility: CLINIC | Age: 43
End: 2023-11-23
Payer: COMMERCIAL

## 2023-11-23 DIAGNOSIS — U07.1 COVID: Primary | ICD-10-CM

## 2023-11-23 PROCEDURE — 99213 OFFICE O/P EST LOW 20 MIN: CPT | Mod: VID | Performed by: EMERGENCY MEDICINE

## 2023-11-23 NOTE — PROGRESS NOTES
"Video visit:   Start time: 9:32 AM   Stop time: 9:50 AM   Duration: 18 minutes   Patient location: Home   Provider location:  [employer] [facility] virtual provider (remote).    Platform used for video visit: Mem[employer]         the patient has been notified of following:     \"This video visit will be conducted via a call between you and your physician/provider. We have found that certain health care needs can be provided without the need for a physical exam.  This service lets us provide the care you need with a short phone conversation.  If a prescription is necessary we can send it directly to your pharmacy.  If lab work is needed we can place an order for that and you can then stop by our lab to have the test done at a later time.    Video visits are billed at different rates depending on your insurance coverage. During this emergency period, for some insurers they may be billed the same as an in-person visit.  Please reach out to your insurance provider with any questions.    If during the course of the call the physician/provider feels a video visit is not appropriate, you will not be charged for this service.\"    Patient has given verbal consent for Telephone visit?  Yes    What phone number would you like to be contacted at?  [phone]    How would you like to obtain your AVS? MyChart    Subjective   CC: Ivan D Welander  is a 43 year old male who presents via phone visit today for the following health issues:   Chief Complaint   Patient presents with     Infection        COVID-19 Symptom Review  How many days ago did these symptoms start? 2    Are any of the following symptoms significant for you?    New or worsening difficulty breathing? No    Worsening cough? Yes, it's a dry cough.     Fever or chills? Yes, I felt feverish or had chills.    Headache: YES    Sore throat: YES    Chest pain: NODiarrhea: No    Body aches? YES    What treatments has patient tried? none   Does patient live in a nursing home, group " home, or shelter? No  Does patient have a way to get food/medications during quarantined? Yes, I have a friend or family member who can help me. and Yes                       Reviewed and updated as needed this visit by Provider                  Review of Systems         Objective    Gen: Patient is alert, oriented  Gen: No acute distress on video visit.  Nondyspneic appearing speaking in full sentences.              Assessment/Plan:  Patient is a 43-year-old male who is on day 2 of COVID symptoms.  Symptoms are typical and relatively mild including no shortness of breath.  Patient is on chronic steroids due to hypopituitarism.  No other comorbidities.  Patient consents to taking Paxlovid.  Typically when patient is ill he increases his Decadron.  I will have him increase it only slightly, less than normal due to the possible increase steroid effect from the paxlovid.  He is to monitor his symptoms for excessive steroid as well as adrenal insufficiency type symptoms.  GFR is greater than 90.  He also understands he is not to take his Viagra for the 5 days of paxlovid treatment.        Jakob Gunderson MD

## 2023-12-19 ENCOUNTER — PATIENT OUTREACH (OUTPATIENT)
Dept: GASTROENTEROLOGY | Facility: CLINIC | Age: 43
End: 2023-12-19
Payer: COMMERCIAL

## 2023-12-21 DIAGNOSIS — E23.0 GROWTH HORMONE DEFICIENCY (H): ICD-10-CM

## 2023-12-22 RX ORDER — SOMATROPIN 10 MG/2ML
INJECTION, SOLUTION SUBCUTANEOUS
Qty: 2 ML | Refills: 3 | Status: SHIPPED | OUTPATIENT
Start: 2023-12-22

## 2023-12-22 NOTE — TELEPHONE ENCOUNTER
somatropin (NUTROPIN AQ NUSPIN 10) 10 MG/2ML SOPN PEN injection   2 mL 5 8/17/2023 2/1/2023  [facility] [place]      Damaris Tejada MD  Endocrinology, Diabetes, and Metabolism    Routed because:   Controlled

## 2024-01-12 DIAGNOSIS — E23.0 HYPOPITUITARISM (H): ICD-10-CM

## 2024-01-17 RX ORDER — DEXAMETHASONE 1 MG
TABLET ORAL
Qty: 105 TABLET | Refills: 3 | Status: SHIPPED | OUTPATIENT
Start: 2024-01-17

## 2024-01-17 NOTE — TELEPHONE ENCOUNTER
"   DEXAMETHASONE 1MG TABLETS 0.5 mg daily plus an additional 5 doses per month as needed      Last Written Prescription Date:  6/29/22  Last Fill Quantity: 105   # refills: 3  Last Office Visit : 2/1/23  \"Secondary adrenal insufficiency  Since patient feels well , he prefers to stay on stay Dexamethasone 0.5 mg daily, rather than switching to hydrocortisone.  A1C 5.1 good.      - continue Dexamethasone 0.5 mg daily\"     Future Office visit:  2/7/24       Routing refill request to provider for review/approval because:  Dexamethasone is not on Endo refill protocol. Next visit 2/7/24               "

## 2024-02-07 ENCOUNTER — VIRTUAL VISIT (OUTPATIENT)
Dept: ENDOCRINOLOGY | Facility: CLINIC | Age: 44
End: 2024-02-07
Payer: COMMERCIAL

## 2024-02-07 ENCOUNTER — MYC MEDICAL ADVICE (OUTPATIENT)
Dept: ENDOCRINOLOGY | Facility: CLINIC | Age: 44
End: 2024-02-07

## 2024-02-07 DIAGNOSIS — E23.0 PANHYPOPITUITARISM (H): Primary | ICD-10-CM

## 2024-02-07 DIAGNOSIS — E23.0 PANHYPOPITUITARISM (H): ICD-10-CM

## 2024-02-07 DIAGNOSIS — E22.0 ACROMEGALY (H): ICD-10-CM

## 2024-02-07 PROCEDURE — G2211 COMPLEX E/M VISIT ADD ON: HCPCS | Mod: 95 | Performed by: INTERNAL MEDICINE

## 2024-02-07 PROCEDURE — 99214 OFFICE O/P EST MOD 30 MIN: CPT | Mod: 95 | Performed by: INTERNAL MEDICINE

## 2024-02-07 RX ORDER — HYDROCORTISONE 5 MG/1
5 TABLET ORAL DAILY
Qty: 270 TABLET | Refills: 3 | Status: SHIPPED | OUTPATIENT
Start: 2024-02-07 | End: 2024-02-09

## 2024-02-07 NOTE — LETTER
2/7/2024       RE: Ivan D Welander  [address]  [facility] [address]     Dear Colleague,    Thank you for referring your patient, Ivan D Welander, to the [facility] [place] at [facility]. Please see a copy of my visit note below.    Ivan D Welander  is being evaluated via a billable video visit.      How would you like to obtain your AVS? [employer]  For the video visit, send the invitation by: Text to cell phone: [phone]   Will anyone else be joining your video visit? No        Video start 10:08 am  End 10:30 am  [place]                                                                               - Endocrinology Follow up -    Reason for visit/consult:  acromegaly    Primary care provider: No Ref-Primary, Physician    Assessment and Plan  A 42 year old male with acromegaly s/p TSS twice, RT, long term panhypopituitarism    # Acromegaly no recurrence- current complete panhypopituitarism  Post TSS twice, last MRI 2/2016 post surgical changes.  Pituitary MRI was done in December 2018 no residual tumor.      # GH deficiecy  Acromegaly, post TSS and RT, no residual.was started at [facility] for GH deficiency given no recurrence and colon cancer no recurrence.  Controversial but with this helping his quality of life and no active contraindicated issue on going, therefore I agreed to continue.    IGF1 previous was normal range with GH injection.  good range, not overdosed.     - continue GH 0.5 mg daily for his QoL    # Felt more energy after Paxlovid   Unclear, but maybe Paxlovid affected dexamethasone metabolism?    # Secondary adrenal insufficiency  Since patient feels well , he prefers to stay on stay Dexamethasone 0.5 mg daily, rather than switching to hydrocortisone. However this visit (1/2024) he expressed to try different steroid option. The barrier is afternoon take, I suggested as follow.     - try to decrease Dexamethasone  from 0.5 mg to 0.25 mg daily    - add on hydrocortisone 15 mg am take only     - Sol-cotef PRN as usual      # Secondary hypothryoidism  Clinically euthyroid  -Continue current dose of levothyroxine 125 mcg daily.    # Secondary hypogonadism  Currently on small dose 50 mg injection once a week    - recheck total testosterone level   - PSA  - CBC        # history of colon cancer  This year he will go to colonoscopy    # dyslipidemia  Previously elevated LDL  - repeat again lipid panel      - RTC with me in 1 year.       35 minutes spent on the date of the encounter doing chart review, history and exam, documentation and further activities as noted above.    The longitudinal plan of care for the condition(s) below were addressed during this visit. Due to the added complexity in care, I will continue to support Alex in the subsequent management of this condition(s) and with the ongoing continuity of care of this condition(s).    Problem List Items Addressed This Visit as of 2/7/2024   Acromegaly, panhypopituitarism      Damaris Tejada MD  Staff Physician  Endocrinology and Metabolism  [facility]  License: [id number]  Pager: [phone]    Interval History as of 2/7/2024 : Patient has been doing well. Had first COVID in 11/2023, he took paxlovid and felt more energy and  wondering better to modify his steroid take.   Interval History as of 6/17/2022 : Patient has been doing well.  Medication compliance  Excellelnt, but run out GH due to insurance . New event includes : no pertinent medical event noted .  Interval History as of 3/10/2021 : Patient has been doing well. Ran out GH for 2-3 weeks. Still on process of approval by new insurance. Otherwise doing well and stable.   Interval History as of 3/11/2020 : Patient has been doing well. No major medical event. Considering family planning, that he tried before, we will back to HCG therapy (previsously [facility] in 2017 he was on HCG 1500 every other  day)  Interval history ; patient has been doing well, however felt a little bit weak since he is off growth hormone injection.  Therefore he was about to resume growth hormone again.  Also he has been missing testosterone injection mentioned some issue his insurance company in the pharmacy?  Otherwise has been stable taking dexamethasone 0.5 mg for adrenal insufficiency.  MRI negative.  HPI: A 37 yo male with acromegaly, here for the care transition due to interstate move.  He moved in 7/2018 from [place], originally from [place].    He was diagnosed Acromegaly in 2007, during the regular physical check up in [facility], and screened and diagnosed. Patient recalls that his pituitary tumor was 1 inch or so.   In 12/2009 first surgery was done at [facility], RT was done after that in [place] 6/2010.   2/2011 he underwent colonoscopy for Colon cancer screening, and results positive for malignancy, then underwent sigmoid resection.     Patient moved to [place], residual tumor detected. his second surgery 12/2015 at [facility] for residual tumor.     Patient has been taking Dexamethasone, testosterone, GH for his panhypopituitarism started since 2010. He used to be on Hydrocortisone, however tends to forgets afternoon dose, then switched to dexamethasone 0.5 mg in [place] due to missing dose.   Levothyroxine 125 mcg. Testosterone injection every week 100 mg.   Growth hormone (neutropin) 0.5 mg daily since 2017.   In [place] 2018, since he was missing dexamethasone for busy baby care, he went to [facility] for sickness.     Last visit 3/20/2018 [facility] Neurosurgery Dr. Parada visit for GHD.     4/2017 colonoscoy: OK,     Past Medical/Surgical History:  Past Medical History:   Diagnosis Date    Asymptomatic LV dysfunction 08/30/2012    Cancer (H)     Colon    Depressive disorder 2011, 2021    Thyroid disease     Acromegaly     Past Surgical History:   Procedure Laterality Date    APPENDECTOMY      COLONOSCOPY       "COLONOSCOPY N/A 4/24/2023    Procedure: COLONOSCOPY, WITH POLYPECTOMY AND BIOPSY;  Surgeon: Ovi Lepe DO;  Location: [facility] OR    GI SURGERY  2011    sigmoid colectomy    HEAD & NECK SURGERY  2010    transphenoidal hypophysectomy, repeat 2016       Allergies:  No Known Allergies    Current Medications   Current Outpatient Medications   Medication    dexAMETHasone (DECADRON) 1 MG tablet    dexamethasone (DECADRON) 1 MG tablet    insulin pen needle (PENTIPS) 31G X 8 MM miscellaneous    levothyroxine (SYNTHROID/LEVOTHROID) 125 MCG tablet    Somatropin (NORDITROPIN FLEXPRO) 5 MG/1.5ML SOPN    somatropin (NUTROPIN AQ NUSPIN 10) 10 MG/2ML SOPN PEN injection    Somatropin (NUTROPIN AQ NUSPIN 5) 5 MG/2ML SOPN    syringe/needle, disp, (B-D SYRINGE/NEEDLE) 23G X 1\" 3 ML MISC    syringe/needle, disp, 23G X 1\" 3 ML MISC    testosterone cypionate (DEPOTESTOSTERONE) 200 MG/ML injection     No current facility-administered medications for this visit.       Family History:  Family History   Problem Relation Age of Onset    Rheumatoid Arthritis Mother     Other Cancer Mother         Nose/skin cancer    Colon Cancer Maternal Grandmother     Cerebrovascular Disease Paternal Grandmother     Coronary Artery Disease Paternal Grandfather     Rheumatoid Arthritis Other     Diabetes Other     Hypertension No family hx of     Hyperlipidemia No family hx of     Breast Cancer No family hx of     Prostate Cancer No family hx of     Depression No family hx of     Anxiety Disorder No family hx of     Mental Illness No family hx of     Substance Abuse No family hx of     Anesthesia Reaction No family hx of     Asthma No family hx of     Osteoporosis No family hx of     Genetic Disorder No family hx of     Thyroid Disease No family hx of     Obesity No family hx of     Unknown/Adopted No family hx of    Maternal uncle: some acromegalic feature    Social History:  Social History     Tobacco Use    Smoking status: Never     Passive exposure: Past " (passive exposure as a child)    Smokeless tobacco: Never   Substance Use Topics    Alcohol use: Yes     Comment: Only so often       ROS:  Full review of systems taken with the help of the intake sheet. Otherwise a complete 14 point review of systems was taken and is negative unless stated in the history above.      Physical Exam:   There were no vitals taken for this visit.  General: well appearing, no acute distress, pleasant and conversant,   Mental Status/neuro: alert and oriented  Face: symmetrical, normal facial color  Eyes: anicteric, no proptosis or lid lag  Resp: normally breathing      Labs : I reviewed data from epic and extract and summarize the pertinent data here.               1/2018  TSH   0.02  Cortisol 5.1       2/19/2020 09:28   Sodium 138   Potassium 3.4   Chloride 104   Carbon Dioxide 29   Urea Nitrogen 8   Creatinine 0.87   GFR Estimate >90   GFR Estimate If Black >90   Calcium 8.5   Anion Gap 5   Albumin 3.7   Protein Total 7.1   Bilirubin Total 0.6   Alkaline Phosphatase 52   ALT 20   AST 19   Hemoglobin A1C 5.1   Cholesterol 238 (H)   FSH 1.0   HDL Cholesterol 77   LDL Cholesterol Calculated 140 (H)   Non HDL Cholesterol 161 (H)   T4 Free 1.27   Testosterone Total 972 (H)   Triglycerides 104   TSH 0.03 (L)   Glucose 84   Ins Growth Factor 1 120   Lutropin 0.2 (L)     MRI Brain: I personally reviewed the original images and agree with the below reports.   Brain/ Pituitary MRI without and with contrast     History: Acromegaly (H). 38 year old male with?acromegaly s/p TSS  twice, RT, long term panhypopituitarism. Post TSS  last MRI 2/2016  post surgical changes (no prior images available.)     ICD-10: Acromegaly (H)     Comparison:  None      Technique: Axial diffusion and FLAIR images of the whole brain  obtained without intravenous contrast. Sagittal T1 and T2-weighted,  coronal T2-weighted, coronal T1-weighted images with focus on the  sella were obtained without intravenous contrast. Post  intravenous  contrast using gadolinium coronal and sagittal T1-weighted images were  obtained focused on the sella. Dynamic postcontrast coronal  T1-weighted images were also obtained.     Contrast: 7.5 mL Gadavist     Findings:       Postsurgical changes of transsphenoidal surgery. There are 2 foci of  T1 hyperintensity in the sella turcica without evidence of enhancement  (series 100, image 11). Few scattered punctate white matter T2  hyperintense foci.     No mass is demonstrated within the sella. The pituitary stalk appears  midline. The optic chiasm appears intact and not displaced.  The major cavernous carotid vascular flow-voids appear patent.       FLAIR images through the whole brain are unremarkable, and demonstrate  no mass effect, midline shift, or significant enlargement of the  ventricles. Small mucous retention cysts in the left and right  maxillary sinuses.                                                                      Impression:  1. Postsurgical changes of transsphenoidal resection. No evidence of  recurrence.  2. Few scattered punctate white matter T2 hyperintense foci,  nonspecific.               Damaris Tejada MD

## 2024-02-07 NOTE — PROGRESS NOTES
Ivan D Welander  is being evaluated via a billable video visit.      How would you like to obtain your AVS? [employer]chat[email or web address]  For the video visit, send the invitation by: Text to cell phone: [phone]   Will anyone else be joining your video visit? No        Video start 10:08 am  End 10:30 am  Amwell                                                                               - Endocrinology Follow up -    Reason for visit/consult:  acromegaly    Primary care provider: No Ref-Primary, Physician    Assessment and Plan  A 42 year old male with acromegaly s/p TSS twice, RT, long term panhypopituitarism    # Acromegaly no recurrence- current complete panhypopituitarism  Post TSS twice, last MRI 2/2016 post surgical changes.  Pituitary MRI was done in December 2018 no residual tumor.      # GH deficiecy  Acromegaly, post TSS and RT, no residual.was started at [facility] for GH deficiency given no recurrence and colon cancer no recurrence.  Controversial but with this helping his quality of life and no active contraindicated issue on going, therefore I agreed to continue.    IGF1 previous was normal range with GH injection.  good range, not overdosed.     - continue GH 0.5 mg daily for his QoL    # Felt more energy after Paxlovid   Unclear, but maybe Paxlovid affected dexamethasone metabolism?    # Secondary adrenal insufficiency  Since patient feels well , he prefers to stay on stay Dexamethasone 0.5 mg daily, rather than switching to hydrocortisone. However this visit (1/2024) he expressed to try different steroid option. The barrier is afternoon take, I suggested as follow.     - try to decrease Dexamethasone from 0.5 mg to 0.25 mg daily    - add on hydrocortisone 15 mg am take only     - Sol-cotef PRN as usual      # Secondary hypothryoidism  Clinically euthyroid  -Continue current dose of levothyroxine 125 mcg daily.    # Secondary hypogonadism  Currently on small dose 50 mg injection once a week    - recheck total  testosterone level   - PSA  - CBC        # history of colon cancer  This year he will go to colonoscopy    # dyslipidemia  Previously elevated LDL  - repeat again lipid panel      - RTC with me in 1 year.       35 minutes spent on the date of the encounter doing chart review, history and exam, documentation and further activities as noted above.    The longitudinal plan of care for the condition(s) below were addressed during this visit. Due to the added complexity in care, I will continue to support Alex in the subsequent management of this condition(s) and with the ongoing continuity of care of this condition(s).    Problem List Items Addressed This Visit as of 2/7/2024   Acromegaly, panhypopituitarism      Damaris Tejada MD  Staff Physician  Endocrinology and Metabolism  [facility]  License: [id number]  Pager: [phone]    Interval History as of 2/7/2024 : Patient has been doing well. Had first COVID in 11/2023, he took paxlovid and felt more energy and  wondering better to modify his steroid take.   Interval History as of 6/17/2022 : Patient has been doing well.  Medication compliance  Excellelnt, but run out GH due to insurance . New event includes : no pertinent medical event noted .  Interval History as of 3/10/2021 : Patient has been doing well. Ran out GH for 2-3 weeks. Still on process of approval by new insurance. Otherwise doing well and stable.   Interval History as of 3/11/2020 : Patient has been doing well. No major medical event. Considering family planning, that he tried before, we will back to HCG therapy (previsously [facility] in 2017 he was on HCG 1500 every other day)  Interval history ; patient has been doing well, however felt a little bit weak since he is off growth hormone injection.  Therefore he was about to resume growth hormone again.  Also he has been missing testosterone injection mentioned some issue his insurance company in the pharmacy?  Otherwise has been stable  taking dexamethasone 0.5 mg for adrenal insufficiency.  MRI negative.  HPI: A 39 yo male with acromegaly, here for the care transition due to interstate move.  He moved in 7/2018 from [place], originally from [place].    He was diagnosed Acromegaly in 2007, during the regular physical check up in [facility], and screened and diagnosed. Patient recalls that his pituitary tumor was 1 inch or so.   In 12/2009 first surgery was done at [facility], RT was done after that in [place] 6/2010.   2/2011 he underwent colonoscopy for Colon cancer screening, and results positive for malignancy, then underwent sigmoid resection.     Patient moved to [place], residual tumor detected. his second surgery 12/2015 at [facility] for residual tumor.     Patient has been taking Dexamethasone, testosterone, GH for his panhypopituitarism started since 2010. He used to be on Hydrocortisone, however tends to forgets afternoon dose, then switched to dexamethasone 0.5 mg in [place] due to missing dose.   Levothyroxine 125 mcg. Testosterone injection every week 100 mg.   Growth hormone (neutropin) 0.5 mg daily since 2017.   [place] [facility] [place], since he was missing dexamethasone for busy baby care, he went to [facility] for sickness.     Last visit 3/20/2018 [facility] Neurosurgery Dr. Parada visit for GHD.     4/2017 colonoscoy: OK,     Past Medical/Surgical History:  Past Medical History:   Diagnosis Date    Asymptomatic LV dysfunction 08/30/2012    Cancer (H)     Colon    Depressive disorder 2011, 2021    Thyroid disease     Acromegaly     Past Surgical History:   Procedure Laterality Date    APPENDECTOMY      COLONOSCOPY      COLONOSCOPY N/A 4/24/2023    Procedure: COLONOSCOPY, WITH POLYPECTOMY AND BIOPSY;  Surgeon: Ovi Lepe DO;  Location: [facility] OR    GI SURGERY  2011    sigmoid colectomy    HEAD & NECK SURGERY  2010    transphenoidal hypophysectomy, repeat 2016       Allergies:  No Known Allergies    Current Medications   Current  "Outpatient Medications   Medication    dexAMETHasone (DECADRON) 1 MG tablet    dexamethasone (DECADRON) 1 MG tablet    insulin pen needle (PENTIPS) 31G X 8 MM miscellaneous    levothyroxine (SYNTHROID/LEVOTHROID) 125 MCG tablet    Somatropin (NORDITROPIN FLEXPRO) 5 MG/1.5ML SOPN    somatropin (NUTROPIN AQ NUSPIN 10) 10 MG/2ML SOPN PEN injection    Somatropin (NUTROPIN AQ NUSPIN 5) 5 MG/2ML SOPN    syringe/needle, disp, (B-D SYRINGE/NEEDLE) 23G X 1\" 3 ML MISC    syringe/needle, disp, 23G X 1\" 3 ML MISC    testosterone cypionate (DEPOTESTOSTERONE) 200 MG/ML injection     No current facility-administered medications for this visit.       Family History:  Family History   Problem Relation Age of Onset    Rheumatoid Arthritis Mother     Other Cancer Mother         Nose/skin cancer    Colon Cancer Maternal Grandmother     Cerebrovascular Disease Paternal Grandmother     Coronary Artery Disease Paternal Grandfather     Rheumatoid Arthritis Other     Diabetes Other     Hypertension No family hx of     Hyperlipidemia No family hx of     Breast Cancer No family hx of     Prostate Cancer No family hx of     Depression No family hx of     Anxiety Disorder No family hx of     Mental Illness No family hx of     Substance Abuse No family hx of     Anesthesia Reaction No family hx of     Asthma No family hx of     Osteoporosis No family hx of     Genetic Disorder No family hx of     Thyroid Disease No family hx of     Obesity No family hx of     Unknown/Adopted No family hx of    Maternal uncle: some acromegalic feature    Social History:  Social History     Tobacco Use    Smoking status: Never     Passive exposure: Past (passive exposure as a child)    Smokeless tobacco: Never   Substance Use Topics    Alcohol use: Yes     Comment: Only so often       ROS:  Full review of systems taken with the help of the intake sheet. Otherwise a complete 14 point review of systems was taken and is negative unless stated in the history " above.      Physical Exam:   There were no vitals taken for this visit.  General: well appearing, no acute distress, pleasant and conversant,   Mental Status/neuro: alert and oriented  Face: symmetrical, normal facial color  Eyes: anicteric, no proptosis or lid lag  Resp: normally breathing      Labs : I reviewed data from epic and extract and summarize the pertinent data here.               1/2018  TSH   0.02  Cortisol 5.1       2/19/2020 09:28   Sodium 138   Potassium 3.4   Chloride 104   Carbon Dioxide 29   Urea Nitrogen 8   Creatinine 0.87   GFR Estimate >90   GFR Estimate If Black >90   Calcium 8.5   Anion Gap 5   Albumin 3.7   Protein Total 7.1   Bilirubin Total 0.6   Alkaline Phosphatase 52   ALT 20   AST 19   Hemoglobin A1C 5.1   Cholesterol 238 (H)   FSH 1.0   HDL Cholesterol 77   LDL Cholesterol Calculated 140 (H)   Non HDL Cholesterol 161 (H)   T4 Free 1.27   Testosterone Total 972 (H)   Triglycerides 104   TSH 0.03 (L)   Glucose 84   Ins Growth Factor 1 120   Lutropin 0.2 (L)     MRI Brain: I personally reviewed the original images and agree with the below reports.   Brain/ Pituitary MRI without and with contrast     History: Acromegaly (H). 38 year old male with?acromegaly s/p TSS  twice, RT, long term panhypopituitarism. Post TSS  last MRI 2/2016  post surgical changes (no prior images available.)     ICD-10: Acromegaly (H)     Comparison:  None      Technique: Axial diffusion and FLAIR images of the whole brain  obtained without intravenous contrast. Sagittal T1 and T2-weighted,  coronal T2-weighted, coronal T1-weighted images with focus on the  sella were obtained without intravenous contrast. Post intravenous  contrast using gadolinium coronal and sagittal T1-weighted images were  obtained focused on the sella. Dynamic postcontrast coronal  T1-weighted images were also obtained.     Contrast: 7.5 mL Gadavist     Findings:       Postsurgical changes of transsphenoidal surgery. There are 2 foci of  T1  hyperintensity in the sella turcica without evidence of enhancement  (series 100, image 11). Few scattered punctate white matter T2  hyperintense foci.     No mass is demonstrated within the sella. The pituitary stalk appears  midline. The optic chiasm appears intact and not displaced.  The major cavernous carotid vascular flow-voids appear patent.       FLAIR images through the whole brain are unremarkable, and demonstrate  no mass effect, midline shift, or significant enlargement of the  ventricles. Small mucous retention cysts in the left and right  maxillary sinuses.                                                                      Impression:  1. Postsurgical changes of transsphenoidal resection. No evidence of  recurrence.  2. Few scattered punctate white matter T2 hyperintense foci,  nonspecific.

## 2024-02-07 NOTE — NURSING NOTE
Is the patient currently in the state of [place]? YES    Visit mode:VIDEO    If the visit is dropped, the patient can be reconnected by: VIDEO VISIT: Text to cell phone:   Telephone Information:   Mobile [phone]       Will anyone else be joining the visit? NO  (If patient encounters technical issues they should call [phone] :[phone])    How would you like to obtain your AVS? MyChart    Are changes needed to the allergy or medication list? No, Pt stated no changes to allergies, and Pt stated no med changes  Patient reported making no changes to e-check in for visit and noted information was accurate.    Reason for visit: RECHECK (Follow up- patient wants to talk about steroid replacement medication. Patient also wants to talk about testosterone dosing. )    Lexi WHITMORE

## 2024-02-09 ENCOUNTER — TELEPHONE (OUTPATIENT)
Dept: ENDOCRINOLOGY | Facility: CLINIC | Age: 44
End: 2024-02-09
Payer: COMMERCIAL

## 2024-02-09 RX ORDER — HYDROCORTISONE 5 MG/1
15 TABLET ORAL DAILY
Qty: 270 TABLET | Refills: 3 | Status: SHIPPED | OUTPATIENT
Start: 2024-02-09 | End: 2024-02-22

## 2024-02-11 DIAGNOSIS — E29.1 HYPOGONADISM MALE: ICD-10-CM

## 2024-02-11 RX ORDER — NEEDLES, SAFETY 22GX1 1/2"
NEEDLE, DISPOSABLE MISCELLANEOUS
Qty: 15 EACH | Refills: 3 | Status: SHIPPED | OUTPATIENT
Start: 2024-02-11

## 2024-02-11 NOTE — TELEPHONE ENCOUNTER
"syringe/needle, disp, (B-D SYRINGE/NEEDLE) 23G X 1\" 3 ML MISC     15 each 3 4/3/2023     2/7/2024  [facility] [place]    Damaris Tejada MD  Endocrinology, Diabetes, and Metabolism  "

## 2024-02-14 ENCOUNTER — TELEPHONE (OUTPATIENT)
Dept: ENDOCRINOLOGY | Facility: CLINIC | Age: 44
End: 2024-02-14
Payer: COMMERCIAL

## 2024-02-14 NOTE — TELEPHONE ENCOUNTER
Patient call:     Appointment type: Return Endocrine   Provider: Terrell   Return date: 1 year follow-up on or near 2/7/25   Speciality phone number: [phone]   Additional appointment(s) needed: N/A   Additional notes: Spoke to pt now was not a good time to schedule. Pt stated they will call back. Sent MyC message with our information, Pt agreed.    COI Note: Return in about 1 year (around 2/7/2025)     Mónica Polanco on 2/14/2024 at 3:02 PM

## 2024-02-22 ENCOUNTER — MYC REFILL (OUTPATIENT)
Dept: ENDOCRINOLOGY | Facility: CLINIC | Age: 44
End: 2024-02-22
Payer: COMMERCIAL

## 2024-02-22 DIAGNOSIS — E23.0 PANHYPOPITUITARISM (H): ICD-10-CM

## 2024-02-23 RX ORDER — HYDROCORTISONE 5 MG/1
15 TABLET ORAL DAILY
Qty: 270 TABLET | Refills: 3 | Status: SHIPPED | OUTPATIENT
Start: 2024-02-23

## 2024-05-22 DIAGNOSIS — E23.0 HYPOPITUITARISM (H): ICD-10-CM

## 2024-05-23 NOTE — TELEPHONE ENCOUNTER
TESTOSTERONE CYP 200MG/ML SDV 1ML     Last Written Prescription Date:  11/20/23  Last Fill Quantity: 6 ml ,   # refills: 5  Last Office Visit : 2/7/24  Future Office visit:  none> one year    Routing refill request to provider for review/approval because:  Controlled substance   Testosterone total done 11/10/23  PSA 7/25/22  Care everywhere and media tab reviewed. Thank you.

## 2024-05-24 RX ORDER — TESTOSTERONE CYPIONATE 200 MG/ML
INJECTION, SOLUTION INTRAMUSCULAR
Qty: 6 ML | Refills: 5 | Status: SHIPPED | OUTPATIENT
Start: 2024-05-24

## 2024-05-28 NOTE — TELEPHONE ENCOUNTER
Due to discontinuation of nutropin 12/31/2024 will change to other formulary preferred medication when renewing pa 06/23/2024

## 2024-06-12 ENCOUNTER — TELEPHONE (OUTPATIENT)
Dept: ENDOCRINOLOGY | Facility: CLINIC | Age: 44
End: 2024-06-12
Payer: COMMERCIAL

## 2024-06-12 NOTE — TELEPHONE ENCOUNTER
Current pa for nutropin expires 06/23/2024.   Patient has not filled nutropin since 02/08/2024. Due to copay card issues (change healthcare data breach possible?)    Nutropin is getting discontinued 12/31/2024.     Per [employer]/[facility] formulary other approved medication is norditropin.   Based on daily dose of 0.5mg, 15mg pen would last 28 day supply. Will attempt pa for new medication due to shortage and discontinuation of nutropin.     PA Initiation    Medication: NORDITROPIN FLEXPRO 15 MG/1.5ML SC SOPN  Insurance Company: [employer] ([facility]) - Phone [phone] Fax [phone]  Pharmacy Filling the Rx: [facility] - [place], [place] - [address]  Filling Pharmacy Phone:    Filling Pharmacy Fax:    Start Date: 6/12/2024

## 2024-06-14 ENCOUNTER — MYC MEDICAL ADVICE (OUTPATIENT)
Dept: PHARMACY | Facility: CLINIC | Age: 44
End: 2024-06-14
Payer: COMMERCIAL

## 2024-06-14 DIAGNOSIS — E23.0 PANHYPOPITUITARISM (H): Primary | ICD-10-CM

## 2024-06-14 RX ORDER — SOMATROPIN 15 MG/1.5ML
0.5 INJECTION, SOLUTION SUBCUTANEOUS DAILY
Qty: 1.5 ML | Refills: 5 | Status: SHIPPED | OUTPATIENT
Start: 2024-06-14 | End: 2024-06-14

## 2024-06-14 RX ORDER — SOMATROPIN 15 MG/1.5ML
0.5 INJECTION, SOLUTION SUBCUTANEOUS DAILY
Qty: 1.5 ML | Refills: 5 | Status: SHIPPED | OUTPATIENT
Start: 2024-06-14

## 2024-06-14 NOTE — TELEPHONE ENCOUNTER
Prior Authorization Approval    Medication: NORDITROPIN FLEXPRO 15 MG/1.5ML SC SOPN  Authorization Effective Date: 6/12/2024  Authorization Expiration Date: 6/12/2025  Approved Dose/Quantity: 1.5ml per 28 day  Reference #: [id number]   Insurance Company: [employer] ([facility]) - Phone [phone] Fax [phone]  Expected CoPay: $ 839.97  CoPay Card Available: Yes    Financial Assistance Needed:   Which Pharmacy is filling the prescription: [facility] - [place], [place] - [address]   Pharmacy Notified:   Patient Notified:  yes via [facility]

## 2024-06-14 NOTE — TELEPHONE ENCOUNTER
Please send new prescription to [facility] for Norditropin Flexpro 15mg, qty 1.5ml for 28 day supply. Daily dose 0.5mg indication of Growth hormone deficiency (H24) [E23.0]     Change needed due to discontinuation of nutropin 12/31/2024.     New start smn to follow

## 2024-06-20 NOTE — TELEPHONE ENCOUNTER
Smn completed to best of liaison ability. Emailed to provider (SEBASTIAN Tejada) for final completion. 06/20/2024 345pm

## 2024-06-28 NOTE — TELEPHONE ENCOUNTER
Received smn from provider.   Faxed to [facility] along with pa approval for new start: 06/28/2024 833am cover plus 7 pages  Faxed to HIM along with pa approval for chart update: 06/28/2024 835am cover plus 7 pages

## 2024-07-21 DIAGNOSIS — E23.0 HYPOPITUITARISM (H): Primary | ICD-10-CM

## 2024-07-25 RX ORDER — LEVOTHYROXINE SODIUM 125 UG/1
125 TABLET ORAL DAILY
Qty: 90 TABLET | Refills: 2 | Status: SHIPPED | OUTPATIENT
Start: 2024-07-25

## 2024-07-25 NOTE — TELEPHONE ENCOUNTER
Last Clinic Visit: Endocrine Clinic 2/7/24 2/7/24 clinic visit-Continue current dose of levothyro  xine 125 mcg daily.

## 2024-09-27 ENCOUNTER — PATIENT OUTREACH (OUTPATIENT)
Dept: CARE COORDINATION | Facility: CLINIC | Age: 44
End: 2024-09-27
Payer: COMMERCIAL

## 2024-10-11 ENCOUNTER — PATIENT OUTREACH (OUTPATIENT)
Dept: CARE COORDINATION | Facility: CLINIC | Age: 44
End: 2024-10-11
Payer: COMMERCIAL

## 2024-12-09 ENCOUNTER — VIRTUAL VISIT (OUTPATIENT)
Dept: FAMILY MEDICINE | Facility: CLINIC | Age: 44
End: 2024-12-09
Payer: COMMERCIAL

## 2024-12-09 ENCOUNTER — TELEPHONE (OUTPATIENT)
Dept: DERMATOLOGY | Facility: CLINIC | Age: 44
End: 2024-12-09

## 2024-12-09 DIAGNOSIS — D22.4 MELANOCYTIC NEVUS OF SCALP: Primary | ICD-10-CM

## 2024-12-09 DIAGNOSIS — D89.89 SUPPRESSION OF IMMUNE SYSTEM SUBTHERAPEUTIC (H): ICD-10-CM

## 2024-12-09 PROCEDURE — 99213 OFFICE O/P EST LOW 20 MIN: CPT | Mod: 95 | Performed by: PHYSICIAN ASSISTANT

## 2024-12-09 ASSESSMENT — PATIENT HEALTH QUESTIONNAIRE - PHQ9
SUM OF ALL RESPONSES TO PHQ QUESTIONS 1-9: 2
SUM OF ALL RESPONSES TO PHQ QUESTIONS 1-9: 2
10. IF YOU CHECKED OFF ANY PROBLEMS, HOW DIFFICULT HAVE THESE PROBLEMS MADE IT FOR YOU TO DO YOUR WORK, TAKE CARE OF THINGS AT HOME, OR GET ALONG WITH OTHER PEOPLE: NOT DIFFICULT AT ALL

## 2024-12-09 NOTE — TELEPHONE ENCOUNTER
This encounter is being sent to inform the clinic that this patient has a referral from Yamila Angel for the diagnoses of Nevus of Scalp/ Suppression on immune system and has requested that this patient be seen within 1-2 Weeks and/or with [facility].  Based on the availability of our provider(s), we are unable to accommodate this request.    Were all sites offered this patient?  Yes    Does scheduling algorithm request to schedule next available?  Patient has been scheduled for the first available opening with KESHA Nguyen on 4/28/25.  We have informed the patient that the clinic will review their referral and reach out if a sooner appointment is medically necessary.

## 2024-12-09 NOTE — PROGRESS NOTES
Alex is a 44 year old who is being evaluated via a billable video visit.    How would you like to obtain your AVS? [id number]  If the video visit is dropped, the invitation should be resent by: Text to cell phone: [phone]  Will anyone else be joining your video visit? No      Assessment & Plan     Melanocytic nevus of scalp  Suppression of immune system subtherapeutic (H)  Dermatology referral placed with some urgency and outside dermatology facilities discussed with patient and listed in repeat referrals as well; patient to update via myseekit regarding timing over the next few weeks and will consider Econsult to try to gain further access. Return to clinic with any worsening or changes in symptoms and follow up with PCP for routine care.   - Adult Dermatology  Referral; Future  - Adult Dermatology  Referral; Future  - Adult Dermatology  Referral; Future  - Adult Dermatology  Referral; Future          See Patient Instructions    Subjective   Alex is a 44 year old, presenting for the following health issues:  Mole    History of Present Illness       Reason for visit:  Irregular and growing mole right temple  Symptom onset:  More than a month  Symptoms include:  Light brown with dark brown inside. no pain grown to a pea size. not scaley, slightly raised.  Symptom intensity:  Mild  Symptom progression:  Worsening  Had these symptoms before:  No  What makes it worse:  No  What makes it better:  No He is missing 7 dose(s) of medications per week.  He is not taking prescribed medications regularly due to other.     Patient noticed mold on side of temple, basically noticed over the summer with increase in size since then; seems like some changes in coloration throughout it and around edges.  Mother with melanoma/skin cancer in the past; patient with history of colon cancer      Review of Systems  Constitutional, HEENT, cardiovascular, pulmonary, gi and gu systems are negative, except  "as otherwise noted.      Objective    Vitals - Patient Reported  Weight (Patient Reported): 136.1 kg (300 lb)  Height (Patient Reported): 185.4 cm (6' 1\")  BMI (Based on Pt Reported Ht/Wt): 39.58      Vitals:  No vitals were obtained today due to virtual visit.    Physical Exam   GENERAL: alert and no distress  EYES: Eyes grossly normal to inspection.  No discharge or erythema, or obvious scleral/conjunctival abnormalities.  RESP: No audible wheeze, cough, or visible cyanosis.    SKIN: Visible skin clear. No significant rash; <1cm circular hyperpigmented lesion on right temple with some darker spots in center and along borders  NEURO: Cranial nerves grossly intact.  Mentation and speech appropriate for age.  PSYCH: Appropriate affect, tone, and pace of words          Video-Visit Details    Type of service:  Video Visit   Originating Location (pt. Location): Home    Distant Location (provider location):  Off-site  Platform used for Video Visit: Erlinda  Signed Electronically by: Chad Angel PA-C    "

## 2024-12-10 ENCOUNTER — APPOINTMENT (OUTPATIENT)
Dept: URBAN - METROPOLITAN AREA CLINIC 252 | Age: 44
Setting detail: DERMATOLOGY
End: 2024-12-10

## 2024-12-10 VITALS — HEIGHT: 72 IN | WEIGHT: 200 LBS

## 2024-12-10 DIAGNOSIS — Z71.89 OTHER SPECIFIED COUNSELING: ICD-10-CM

## 2024-12-10 DIAGNOSIS — D22 MELANOCYTIC NEVI: ICD-10-CM

## 2024-12-10 DIAGNOSIS — L82.1 OTHER SEBORRHEIC KERATOSIS: ICD-10-CM

## 2024-12-10 DIAGNOSIS — D18.0 HEMANGIOMA: ICD-10-CM

## 2024-12-10 PROBLEM — D18.01 HEMANGIOMA OF SKIN AND SUBCUTANEOUS TISSUE: Status: ACTIVE | Noted: 2024-12-10

## 2024-12-10 PROBLEM — D22.5 MELANOCYTIC NEVI OF TRUNK: Status: ACTIVE | Noted: 2024-12-10

## 2024-12-10 PROBLEM — D22.62 MELANOCYTIC NEVI OF LEFT UPPER LIMB, INCLUDING SHOULDER: Status: ACTIVE | Noted: 2024-12-10

## 2024-12-10 PROCEDURE — OTHER COUNSELING: OTHER

## 2024-12-10 PROCEDURE — 99203 OFFICE O/P NEW LOW 30 MIN: CPT

## 2024-12-10 PROCEDURE — OTHER EDUCATIONAL RESOURCES PROVIDED: OTHER

## 2024-12-10 ASSESSMENT — LOCATION DETAILED DESCRIPTION DERM
LOCATION DETAILED: LEFT POSTERIOR SHOULDER
LOCATION DETAILED: RIGHT CENTRAL TEMPLE
LOCATION DETAILED: RIGHT MEDIAL UPPER BACK
LOCATION DETAILED: RIGHT MID-UPPER BACK
LOCATION DETAILED: EPIGASTRIC SKIN
LOCATION DETAILED: PERIUMBILICAL SKIN

## 2024-12-10 ASSESSMENT — LOCATION SIMPLE DESCRIPTION DERM
LOCATION SIMPLE: RIGHT UPPER BACK
LOCATION SIMPLE: RIGHT TEMPLE
LOCATION SIMPLE: LEFT SHOULDER
LOCATION SIMPLE: ABDOMEN

## 2024-12-10 ASSESSMENT — LOCATION ZONE DERM
LOCATION ZONE: FACE
LOCATION ZONE: ARM
LOCATION ZONE: TRUNK

## 2024-12-10 NOTE — PROCEDURE: COUNSELING
Detail Level: Generalized
Patient Specific Counseling (Will Not Stick From Patient To Patient): - Patient encouraged to confirm his mother’s skin cancer history as this may change the recommendation for full body skin checks.
Detail Level: Zone
Detail Level: Simple

## 2024-12-24 ENCOUNTER — TELEPHONE (OUTPATIENT)
Dept: ENDOCRINOLOGY | Facility: CLINIC | Age: 44
End: 2024-12-24
Payer: COMMERCIAL

## 2024-12-24 ENCOUNTER — VIRTUAL VISIT (OUTPATIENT)
Dept: PHARMACY | Facility: CLINIC | Age: 44
End: 2024-12-24
Attending: INTERNAL MEDICINE
Payer: COMMERCIAL

## 2024-12-24 DIAGNOSIS — E23.0 GROWTH HORMONE DEFICIENCY (H): Primary | ICD-10-CM

## 2024-12-24 DIAGNOSIS — E29.1 HYPOGONADISM MALE: ICD-10-CM

## 2024-12-24 DIAGNOSIS — E03.9 HYPOTHYROIDISM, UNSPECIFIED TYPE: ICD-10-CM

## 2024-12-24 DIAGNOSIS — E27.40 ADRENAL INSUFFICIENCY (H): ICD-10-CM

## 2024-12-24 NOTE — PATIENT INSTRUCTIONS
"Recommendations from today's MTM visit:                                                      I will work with liaison team in January to assist with growth hormone cost and coverage     Follow-up: Via My Chart pending growth hormone coverage     Endocrine Team & Next Follow-Up:  1/8/25 with Dr. Tejada    It was great speaking with you today.  I value your experience and would be very thankful for your time in providing feedback in our clinic survey. In the next few days, you may receive an email or text message from [employer] with a link to a survey related to your  clinical pharmacist.\"     To schedule another MTM appointment, please call the clinic directly or you may call the MTM scheduling line at [phone].    My Clinical Pharmacist's contact information:                                                      Please feel free to contact me with any questions or concerns you have.      Xi Inman), PharmD  Endocrine & Diabetes Medication Therapy Management Pharmacist   [address] [place] [phone]    "

## 2024-12-24 NOTE — Clinical Note
FYI- patient has not been on GH since June, I will work with the liaison team in January before your appointment with the patient to see what formulation of GH would be best covered/most affordable and let you know.   Xi [facility]), PharmD Endocrine & Diabetes Medication Therapy Management Pharmacist  [address] [place] [phone]

## 2024-12-24 NOTE — PROGRESS NOTES
Medication Therapy Management (MTM) Encounter    ASSESSMENT:                            Medication Adherence/Access: See below for considerations.    GHD: Plan in place to work with liaison team to determine what formulations of somatropin will be covered under the patient's insurance plan in 2025 and what programs are available to help with co-pay assistance. Unfortunately patient does not meet income requirements for [employer] program thus only programs available at this time would be co-pay assistance programs through drug manufacturers. Not able to re-start Nutropin since this is being discontinued in 2025.    Hypothyroidism: Stable, plan in place for patient to obtain updated labs next week before upcoming visit with Dr. Tejada.     Adrenal insufficiency: Stable per patient reports, see above regarding plan to re-start growth hormone.     Hypogonadism: Stable, plan in place for patient to obtain updated labs next week before upcoming visit with Dr. Tejada.     PLAN:                            I will work with liaison team in January to assist with growth hormone cost and coverage     Follow-up: Via My Chart pending growth hormone coverage     Endocrine Team & Next Follow-Up:  1/8/25 with Dr. Tejada    SUBJECTIVE/OBJECTIVE:                          Ivan Welander is a 44 year old male called for an initial visit. He was referred to me from Dr. Tejada.      Reason for visit: Growth hormone access.    Allergies/ADRs: Reviewed in chart  Past Medical History: Reviewed in chart  Tobacco: He reports that he has never smoked. He has been exposed to tobacco smoke. He has never used smokeless tobacco.  Alcohol: Rare use     Medication Adherence/Access: Patient notes that his insurance will continue to be the same insurance plan in 2025 and this will become effective 1/1/2025. Notes that growth hormone is not affordable but all other medications seem to be at this time.     GHD:   Patient is currently not taking any  growth hormone medication. Patient notes that the last medication that he was using was Nutropin and this was back in June of 2024. He then got a new prescription for Norditropin and was too expensive so never filled the prescription. Notes that Norditropin co-pay card would only help with 3 month supply as copay was ~$800/month for Norditropin.    Hypothyroidism   Current Medications:   Levothyroxine 125 mcg daily.   Patient is having the following symptoms: none.   TSH   Date Value Ref Range Status   11/10/2023 0.01 (L) 0.30 - 4.20 uIU/mL Final   07/25/2022 0.12 (L) 0.40 - 4.00 mU/L Final   05/14/2021 0.07 (L) 0.40 - 4.00 mU/L Final      Adrenal insufficiency:   Current Medications:   Dexamethasone 1/4 tablet (0.25mg) daily   Hydrocortisone 15mg daily   Patient finds current regimen to be effective however patient finds that lack of growth hormone therapy causes him to feel more fatigued and dry skin.     Hypogonadism:  Current Medications:   Testosterone 50mg once weekly   Patient has no concerns of side effects with current regimen.       Today's Vitals: There were no vitals taken for this visit.  ----------------    I spent 30 minutes with this patient today. All changes were made via collaborative practice agreement with Damaris Tejada.     A summary of these recommendations was sent via [employer].    Xi Inman), PharmD  Endocrine & Diabetes Medication Therapy Management Pharmacist   [address] [place] [id number]     Telemedicine Visit Details  The patient's medications can be safely assessed via a telemedicine encounter.  Type of service:  Telephone visit  Originating Location (pt. Location): Home    Distant Location (provider location):  Off-site  Start Time:  11:33 AM  End Time: 12:03 PM     Medication Therapy Recommendations  No medication therapy recommendations to display

## 2024-12-30 ENCOUNTER — LAB (OUTPATIENT)
Dept: LAB | Facility: CLINIC | Age: 44
End: 2024-12-30
Payer: COMMERCIAL

## 2024-12-30 DIAGNOSIS — E23.0 PANHYPOPITUITARISM (H): ICD-10-CM

## 2024-12-30 PROCEDURE — 84403 ASSAY OF TOTAL TESTOSTERONE: CPT

## 2024-12-30 PROCEDURE — 84443 ASSAY THYROID STIM HORMONE: CPT

## 2024-12-30 PROCEDURE — 36415 COLL VENOUS BLD VENIPUNCTURE: CPT

## 2024-12-30 PROCEDURE — 84439 ASSAY OF FREE THYROXINE: CPT

## 2024-12-30 PROCEDURE — 84305 ASSAY OF SOMATOMEDIN: CPT

## 2024-12-31 LAB
IGF-I BLD-MCNC: 59 NG/ML (ref 76–230)
T4 FREE SERPL-MCNC: 1.54 NG/DL (ref 0.9–1.7)
TSH SERPL DL<=0.005 MIU/L-ACNC: 0.02 UIU/ML (ref 0.3–4.2)

## 2025-01-02 LAB — TESTOST SERPL-MCNC: 637 NG/DL (ref 240–950)

## 2025-01-08 ENCOUNTER — OFFICE VISIT (OUTPATIENT)
Dept: ENDOCRINOLOGY | Facility: CLINIC | Age: 45
End: 2025-01-08
Payer: COMMERCIAL

## 2025-01-08 VITALS
DIASTOLIC BLOOD PRESSURE: 79 MMHG | HEIGHT: 73 IN | BODY MASS INDEX: 26.64 KG/M2 | SYSTOLIC BLOOD PRESSURE: 112 MMHG | WEIGHT: 201 LBS | HEART RATE: 71 BPM | OXYGEN SATURATION: 99 %

## 2025-01-08 DIAGNOSIS — E23.0 PANHYPOPITUITARISM: ICD-10-CM

## 2025-01-08 DIAGNOSIS — E23.0 HYPOPITUITARISM: ICD-10-CM

## 2025-01-08 PROCEDURE — G2211 COMPLEX E/M VISIT ADD ON: HCPCS | Performed by: INTERNAL MEDICINE

## 2025-01-08 PROCEDURE — 99214 OFFICE O/P EST MOD 30 MIN: CPT | Performed by: INTERNAL MEDICINE

## 2025-01-08 ASSESSMENT — PAIN SCALES - GENERAL: PAINLEVEL_OUTOF10: NO PAIN (0)

## 2025-01-08 NOTE — LETTER
1/8/2025       RE: Ivan D Welander  [address]  [facility] [address]     Dear Colleague,    Thank you for referring your patient, Ivan D Welander, to the [facility] [place] at [facility]. Please see a copy of my visit note below.                                                                                 - Endocrinology Follow up -    Reason for visit/consult:  acromegaly    Primary care provider: No Ref-Primary, Physician    Assessment and Plan  A 44 year old male with acromegaly s/p TSS twice, RT, long term panhypopituitarism    # Acromegaly no recurrence- current complete panhypopituitarism  Post TSS twice, last MRI 2/2016 post surgical changes.  Pituitary MRI was done in December 2018 no residual tumor.    # GH deficiency  Acromegaly, post TSS and RT, no residual. Was started on GH at [facility] for GH deficiency given no recurrence and colon cancer no recurrence.  Controversial but with this helping his quality of life and no active contraindicated issue on going, therefore I agreed to continue. IGF-1 was in normal range at 120 with GH injections. Decreased to 59 on 12/30/24 after 6 mo lapse in GH treatment due to discontinuation of drug/lack of insurance coverage.  - Plan today is to continue GH treatment if covered by insurance.    # Felt more energy after Paxlovid   Unclear, but maybe Paxlovid affected dexamethasone metabolism?    # Secondary adrenal insufficiency  Since patient feels well on Dexamethasone 0.125 mg daily and 15 mg daily AM hydrocortisone.    - Sol-cotef PRN as usual    # Secondary hypothryoidism  Clinically euthyroid  -Continue current dose of levothyroxine 125 mcg daily.    # Secondary hypogonadism  Currently on small dose 50 mg injection once a week    # history of colon cancer  Last colonoscopy was 4/24/23.   - Repeat colonoscopy in 3 years for surveillance    # dyslipidemia  Previously elevated LDL  - repeat  again lipid panel      - RTC with me in 1 year.     Plan of care discussed with Dr. Tejada.     Michelle Chris  Medical Student, MS3      Attending tie-in note  I saw the patient with Chris MS3 and directly examined patient and discussed. Agree above note and plan.     35 minutes spent on the date of the encounter doing chart review, history and exam, documentation and further activities as noted above.    The longitudinal plan of care for the condition(s) below were addressed during this visit. Due to the added complexity in care, I will continue to support Alex in the subsequent management of this condition(s) and with the ongoing continuity of care of this condition(s).          Damaris Tejada MD  Staff Physician  Endocrinology and Metabolism  [facility]  License: [id number]  Pager: [phone]    Interval History as of 1/8/2025 : Patient has been doing well. Last seen 2/7/2024. Medication compliance excellent, however ran out of GH 6 mo ago due to drug discontinuation and notices decreased energy. Still attempting to find new GH covered by insurance.  Interval History as of 2/7/2024 : Patient has been doing well. Had first COVID in 11/2023, he took paxlovid and felt more energy and  wondering better to modify his steroid take.   Interval History as of 6/17/2022 : Patient has been doing well.  Medication compliance  Excellelnt, but run out GH due to insurance . New event includes : no pertinent medical event noted .  Interval History as of 3/10/2021 : Patient has been doing well. Ran out GH for 2-3 weeks. Still on process of approval by new insurance. Otherwise doing well and stable.   Interval History as of 3/11/2020 : Patient has been doing well. No major medical event. Considering family planning, that he tried before, we will back to HCG therapy (previsously [facility] in 2017 he was on HCG 1500 every other day)  Interval history ; patient has been doing well, however felt a little bit weak since he  is off growth hormone injection.  Therefore he was about to resume growth hormone again.  Also he has been missing testosterone injection mentioned some issue his insurance company in the pharmacy?  Otherwise has been stable taking dexamethasone 0.5 mg for adrenal insufficiency.  MRI negative.  HPI: A 39 yo male with acromegaly, here for the care transition due to interstate move.  He moved in 7/2018 from [place], originally from [place].    He was diagnosed Acromegaly in 2007, during the regular physical check up in [facility], and screened and diagnosed. 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   In 12/2009 first surgery was done at [facility], RT was done after that in [place] 6/2010.   2/2011 he underwent colonoscopy for Colon cancer screening, and results positive for malignancy, then underwent sigmoid resection.     Patient moved to [place], residual tumor detected. his second surgery 12/2015 at [facility] for residual tumor.     Patient has been taking Dexamethasone, testosterone, GH for his panhypopituitarism started since 2010. He used to be on Hydrocortisone, however tends to forgets afternoon dose, then switched to dexamethasone 0.5 mg in [place] due to missing dose.   Levothyroxine 125 mcg. Testosterone injection every week 100 mg.   Growth hormone (neutropin) 0.5 mg daily since 2017.   [facility] since he was missing dexamethasone for busy baby care, he went to [facility] for sickness.     Last visit 3/20/2018 [facility] Neurosurgery Dr. Parada visit for GHD.     4/2017 colonoscoy: OK,     Past Medical/Surgical History:  Past Medical History:   Diagnosis Date     Asymptomatic LV dysfunction 08/30/2012     Cancer (H)     Colon     Depressive disorder 2011, 2021     Thyroid disease     Acromegaly     Past Surgical History:   Procedure Laterality Date     APPENDECTOMY       COLONOSCOPY       COLONOSCOPY N/A 4/24/2023    Procedure: COLONOSCOPY, WITH POLYPECTOMY  "AND BIOPSY;  Surgeon: Ovi Lepe DO;  Location: [facility] OR     GI SURGERY  2011    sigmoid colectomy     HEAD & NECK SURGERY  2010    transphenoidal hypophysectomy, repeat 2016       Allergies:  No Known Allergies    Current Medications   Current Outpatient Medications   Medication Sig Dispense Refill     dexAMETHasone (DECADRON) 1 MG tablet TAKE 0.5 MG DAILY PLUS AN ADDITIONAL 5 DOSES PER MONTH AS NEEDED 105 tablet 3     hydrocortisone (CORTEF) 5 MG tablet Take 3 tablets (15 mg) by mouth daily 270 tablet 3     insulin pen needle (PENTIPS) 31G X 8 MM miscellaneous For Somatropin (NUTROPIN injection daily. Subcutaneous 100 each 1     levothyroxine (SYNTHROID/LEVOTHROID) 125 MCG tablet Take 1 tablet (125 mcg) by mouth daily 90 tablet 2     syringe/needle, disp, (B-D SYRINGE/NEEDLE) 23G X 1\" 3 ML MISC USE 1 EVERY WEEK 15 each 3     testosterone cypionate (DEPOTESTOSTERONE) 200 MG/ML injection INJECT 0.25 ML(50MG) INTO THE MUSCLE ONCE A WEEK. 6 mL 5     Somatropin (NORDITROPIN FLEXPRO) 15 MG/1.5ML SOPN Inject 0.5 mg Subcutaneous daily (Patient not taking: Reported on 1/8/2025) 1.5 mL 5     Somatropin (NORDITROPIN FLEXPRO) 5 MG/1.5ML SOPN Inject 0.5 mg Subcutaneous daily (Patient not taking: Reported on 1/8/2025) 4.5 mL 5     No current facility-administered medications for this visit.       Family History:  Family History   Problem Relation Age of Onset     Rheumatoid Arthritis Mother      Other Cancer Mother         Nose/skin cancer     Colon Cancer Maternal Grandmother      Cerebrovascular Disease Paternal Grandmother      Coronary Artery Disease Paternal Grandfather      Rheumatoid Arthritis Other      Diabetes Other      Hypertension No family hx of      Hyperlipidemia No family hx of      Breast Cancer No family hx of      Prostate Cancer No family hx of      Depression No family hx of      Anxiety Disorder No family hx of      Mental Illness No family hx of      Substance Abuse No family hx of      Anesthesia " Reaction No family hx of      Asthma No family hx of      Osteoporosis No family hx of      Genetic Disorder No family hx of      Thyroid Disease No family hx of      Obesity No family hx of      Unknown/Adopted No family hx of    Maternal uncle: some acromegalic feature    Social History:  Social History     Tobacco Use     Smoking status: Never     Passive exposure: Past (passive exposure as a child)     Smokeless tobacco: Never   Substance Use Topics     Alcohol use: Yes     Comment: Only so often       ROS:  Full review of systems taken with the help of the intake sheet. Otherwise a complete 14 point review of systems was taken and is negative unless stated in the history above.      Physical Exam:   There were no vitals taken for this visit.  General: well appearing, no acute distress, pleasant and conversant,   Mental Status/neuro: alert and oriented  Face: symmetrical, normal facial color  Eyes: anicteric, no proptosis or lid lag  Resp: normally breathing      Labs : I reviewed data from epic and extract and summarize the pertinent data here.               1/2018  TSH   0.02  Cortisol 5.1       2/19/2020 09:28   Sodium 138   Potassium 3.4   Chloride 104   Carbon Dioxide 29   Urea Nitrogen 8   Creatinine 0.87   GFR Estimate >90   GFR Estimate If Black >90   Calcium 8.5   Anion Gap 5   Albumin 3.7   Protein Total 7.1   Bilirubin Total 0.6   Alkaline Phosphatase 52   ALT 20   AST 19   Hemoglobin A1C 5.1   Cholesterol 238 (H)   FSH 1.0   HDL Cholesterol 77   LDL Cholesterol Calculated 140 (H)   Non HDL Cholesterol 161 (H)   T4 Free 1.27   Testosterone Total 972 (H)   Triglycerides 104   TSH 0.03 (L)   Glucose 84   Ins Growth Factor 1 120   Lutropin 0.2 (L)     MRI Brain: I personally reviewed the original images and agree with the below reports.   Brain/ Pituitary MRI without and with contrast     History: Acromegaly (H). 38 year old male with?acromegaly s/p TSS  twice, RT, long term panhypopituitarism. Post TSS   last MRI 2/2016  post surgical changes (no prior images available.)     ICD-10: Acromegaly (H)     Comparison:  None      Technique: Axial diffusion and FLAIR images of the whole brain  obtained without intravenous contrast. Sagittal T1 and T2-weighted,  coronal T2-weighted, coronal T1-weighted images with focus on the  sella were obtained without intravenous contrast. Post intravenous  contrast using gadolinium coronal and sagittal T1-weighted images were  obtained focused on the sella. Dynamic postcontrast coronal  T1-weighted images were also obtained.     Contrast: 7.5 mL Gadavist     Findings:       Postsurgical changes of transsphenoidal surgery. There are 2 foci of  T1 hyperintensity in the sella turcica without evidence of enhancement  (series 100, image 11). Few scattered punctate white matter T2  hyperintense foci.     No mass is demonstrated within the sella. The pituitary stalk appears  midline. The optic chiasm appears intact and not displaced.  The major cavernous carotid vascular flow-voids appear patent.       FLAIR images through the whole brain are unremarkable, and demonstrate  no mass effect, midline shift, or significant enlargement of the  ventricles. Small mucous retention cysts in the left and right  maxillary sinuses.                                                                      Impression:  1. Postsurgical changes of transsphenoidal resection. No evidence of  recurrence.  2. Few scattered punctate white matter T2 hyperintense foci,  nonspecific.                       Again, thank you for allowing me to participate in the care of your patient.      Sincerely,    Damaris Tejada MD

## 2025-01-08 NOTE — PROGRESS NOTES
- Endocrinology Follow up -    Reason for visit/consult:  acromegaly    Primary care provider: No Ref-Primary, Physician    Assessment and Plan  A 44 year old male with acromegaly s/p TSS twice, RT, long term panhypopituitarism    # Acromegaly no recurrence- current complete panhypopituitarism  Post TSS twice, last MRI 2/2016 post surgical changes.  Pituitary MRI was done in December 2018 no residual tumor.    # GH deficiency  Acromegaly, post TSS and RT, no residual. Was started on GH at [facility] for GH deficiency given no recurrence and colon cancer no recurrence.  Controversial but with this helping his quality of life and no active contraindicated issue on going, therefore I agreed to continue. IGF-1 was in normal range at 120 with GH injections. Decreased to 59 on 12/30/24 after 6 mo lapse in GH treatment due to discontinuation of drug/lack of insurance coverage.  - Plan today is to continue GH treatment if covered by insurance.    # Felt more energy after Paxlovid   Unclear, but maybe Paxlovid affected dexamethasone metabolism?    # Secondary adrenal insufficiency  Since patient feels well on Dexamethasone 0.125 mg daily and 15 mg daily AM hydrocortisone.    - Sol-cotef PRN as usual    # Secondary hypothryoidism  Clinically euthyroid  -Continue current dose of levothyroxine 125 mcg daily.    # Secondary hypogonadism  Currently on small dose 50 mg injection once a week    # history of colon cancer  Last colonoscopy was 4/24/23.   - Repeat colonoscopy in 3 years for surveillance    # dyslipidemia  Previously elevated LDL  - repeat again lipid panel      - RTC with me in 1 year.     Plan of care discussed with Dr. Tejada.     Michelle Perez  Medical Student, MS3      Attending tie-in note  I saw the patient with Chris MS3 and directly examined patient and discussed. Agree above note and plan.     35 minutes spent on the date of the encounter  doing chart review, history and exam, documentation and further activities as noted above.    The longitudinal plan of care for the condition(s) below were addressed during this visit. Due to the added complexity in care, I will continue to support Alex in the subsequent management of this condition(s) and with the ongoing continuity of care of this condition(s).          Damaris Tejada MD  Staff Physician  Endocrinology and Metabolism  [facility]  License: [id number]  Pager: [phone]    Interval History as of 1/8/2025 : Patient has been doing well. Last seen 2/7/2024. Medication compliance excellent, however ran out of GH 6 mo ago due to drug discontinuation and notices decreased energy. Still attempting to find new GH covered by insurance.  Interval History as of 2/7/2024 : Patient has been doing well. Had first COVID in 11/2023, he took paxlovid and felt more energy and  wondering better to modify his steroid take.   Interval History as of 6/17/2022 : Patient has been doing well.  Medication compliance  Excellelnt, but run out GH due to insurance . New event includes : no pertinent medical event noted .  Interval History as of 3/10/2021 : Patient has been doing well. Ran out GH for 2-3 weeks. Still on process of approval by new insurance. Otherwise doing well and stable.   Interval History as of 3/11/2020 : Patient has been doing well. No major medical event. Considering family planning, that he tried before, we will back to HCG therapy (previsously [facility] in 2017 he was on HCG 1500 every other day)  Interval history ; patient has been doing well, however felt a little bit weak since he is off growth hormone injection.  Therefore he was about to resume growth hormone again.  Also he has been missing testosterone injection mentioned some issue his insurance company in the pharmacy?  Otherwise has been stable taking dexamethasone 0.5 mg for adrenal insufficiency.  MRI negative.  HPI: A 39 yo male  with acromegaly, here for the care transition due to interstate move.  He moved in 7/2018 from [place], originally from [place].    He was diagnosed Acromegaly in 2007, during the regular physical check up in [facility], and screened and diagnosed. 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   In 12/2009 first surgery was done at [facility], RT was done after that in [place] 6/2010.   2/2011 he underwent colonoscopy for Colon cancer screening, and results positive for malignancy, then underwent sigmoid resection.     Patient moved to [place], residual tumor detected. his second surgery 12/2015 at [facility] for residual tumor.     Patient has been taking Dexamethasone, testosterone, GH for his panhypopituitarism started since 2010. He used to be on Hydrocortisone, however tends to forgets afternoon dose, then switched to dexamethasone 0.5 mg in [place] due to missing dose.   Levothyroxine 125 mcg. Testosterone injection every week 100 mg.   Growth hormone (neutropin) 0.5 mg daily since 2017.   [facility] since he was missing dexamethasone for busy baby care, he went to [facility].     Last visit 3/20/2018 [facility] Neurosurgery Dr. Parada visit for GHD.     4/2017 colonoscoy: OK,     Past Medical/Surgical History:  Past Medical History:   Diagnosis Date    Asymptomatic LV dysfunction 08/30/2012    Cancer (H)     Colon    Depressive disorder 2011, 2021    Thyroid disease     Acromegaly     Past Surgical History:   Procedure Laterality Date    APPENDECTOMY      COLONOSCOPY      COLONOSCOPY N/A 4/24/2023    Procedure: COLONOSCOPY, WITH POLYPECTOMY AND BIOPSY;  Surgeon: Ovi Lepe DO;  [place] [facility] [place]    GI SURGERY  2011    sigmoid colectomy    HEAD & NECK SURGERY  2010    transphenoidal hypophysectomy, repeat 2016       Allergies:  No Known Allergies    Current Medications   Current Outpatient Medications   Medication Sig Dispense Refill    dexAMETHasone  "(DECADRON) 1 MG tablet TAKE 0.5 MG DAILY PLUS AN ADDITIONAL 5 DOSES PER MONTH AS NEEDED 105 tablet 3    hydrocortisone (CORTEF) 5 MG tablet Take 3 tablets (15 mg) by mouth daily 270 tablet 3    insulin pen needle (PENTIPS) 31G X 8 MM miscellaneous For Somatropin (NUTROPIN injection daily. Subcutaneous 100 each 1    levothyroxine (SYNTHROID/LEVOTHROID) 125 MCG tablet Take 1 tablet (125 mcg) by mouth daily 90 tablet 2    syringe/needle, disp, (B-D SYRINGE/NEEDLE) 23G X 1\" 3 ML MISC USE 1 EVERY WEEK 15 each 3    testosterone cypionate (DEPOTESTOSTERONE) 200 MG/ML injection INJECT 0.25 ML(50MG) INTO THE MUSCLE ONCE A WEEK. 6 mL 5    Somatropin (NORDITROPIN FLEXPRO) 15 MG/1.5ML SOPN Inject 0.5 mg Subcutaneous daily (Patient not taking: Reported on 1/8/2025) 1.5 mL 5    Somatropin (NORDITROPIN FLEXPRO) 5 MG/1.5ML SOPN Inject 0.5 mg Subcutaneous daily (Patient not taking: Reported on 1/8/2025) 4.5 mL 5     No current facility-administered medications for this visit.       Family History:  Family History   Problem Relation Age of Onset    Rheumatoid Arthritis Mother     Other Cancer Mother         Nose/skin cancer    Colon Cancer Maternal Grandmother     Cerebrovascular Disease Paternal Grandmother     Coronary Artery Disease Paternal Grandfather     Rheumatoid Arthritis Other     Diabetes Other     Hypertension No family hx of     Hyperlipidemia No family hx of     Breast Cancer No family hx of     Prostate Cancer No family hx of     Depression No family hx of     Anxiety Disorder No family hx of     Mental Illness No family hx of     Substance Abuse No family hx of     Anesthesia Reaction No family hx of     Asthma No family hx of     Osteoporosis No family hx of     Genetic Disorder No family hx of     Thyroid Disease No family hx of     Obesity No family hx of     Unknown/Adopted No family hx of    Maternal uncle: some acromegalic feature    Social History:  Social History     Tobacco Use    Smoking status: Never     " Passive exposure: Past (passive exposure as a child)    Smokeless tobacco: Never   Substance Use Topics    Alcohol use: Yes     Comment: Only so often       ROS:  Full review of systems taken with the help of the intake sheet. Otherwise a complete 14 point review of systems was taken and is negative unless stated in the history above.      Physical Exam:   There were no vitals taken for this visit.  General: well appearing, no acute distress, pleasant and conversant,   Mental Status/neuro: alert and oriented  Face: symmetrical, normal facial color  Eyes: anicteric, no proptosis or lid lag  Resp: normally breathing      Labs : I reviewed data from epic and extract and summarize the pertinent data here.               1/2018  TSH   0.02  Cortisol 5.1       2/19/2020 09:28   Sodium 138   Potassium 3.4   Chloride 104   Carbon Dioxide 29   Urea Nitrogen 8   Creatinine 0.87   GFR Estimate >90   GFR Estimate If Black >90   Calcium 8.5   Anion Gap 5   Albumin 3.7   Protein Total 7.1   Bilirubin Total 0.6   Alkaline Phosphatase 52   ALT 20   AST 19   Hemoglobin A1C 5.1   Cholesterol 238 (H)   FSH 1.0   HDL Cholesterol 77   LDL Cholesterol Calculated 140 (H)   Non HDL Cholesterol 161 (H)   T4 Free 1.27   Testosterone Total 972 (H)   Triglycerides 104   TSH 0.03 (L)   Glucose 84   Ins Growth Factor 1 120   Lutropin 0.2 (L)     MRI Brain: I personally reviewed the original images and agree with the below reports.   Brain/ Pituitary MRI without and with contrast     History: Acromegaly (H). 38 year old male with?acromegaly s/p TSS  twice, RT, long term panhypopituitarism. Post TSS  last MRI 2/2016  post surgical changes (no prior images available.)     ICD-10: Acromegaly (H)     Comparison:  None      Technique: Axial diffusion and FLAIR images of the whole brain  obtained without intravenous contrast. Sagittal T1 and T2-weighted,  coronal T2-weighted, coronal T1-weighted images with focus on the  sella were obtained without  intravenous contrast. Post intravenous  contrast using gadolinium coronal and sagittal T1-weighted images were  obtained focused on the sella. Dynamic postcontrast coronal  T1-weighted images were also obtained.     Contrast: 7.5 mL Gadavist     Findings:       Postsurgical changes of transsphenoidal surgery. There are 2 foci of  T1 hyperintensity in the sella turcica without evidence of enhancement  (series 100, image 11). Few scattered punctate white matter T2  hyperintense foci.     No mass is demonstrated within the sella. The pituitary stalk appears  midline. The optic chiasm appears intact and not displaced.  The major cavernous carotid vascular flow-voids appear patent.       FLAIR images through the whole brain are unremarkable, and demonstrate  no mass effect, midline shift, or significant enlargement of the  ventricles. Small mucous retention cysts in the left and right  maxillary sinuses.                                                                      Impression:  1. Postsurgical changes of transsphenoidal resection. No evidence of  recurrence.  2. Few scattered punctate white matter T2 hyperintense foci,  nonspecific.

## 2025-01-08 NOTE — NURSING NOTE
"Chief Complaint   Patient presents with    Pituitary Problem     Vital signs:      BP: 112/79 Pulse: 71     SpO2: 99 %     Height: 185.4 cm (6' 1\") Weight: 91.2 kg (201 lb)  Estimated body mass index is 26.52 kg/m  as calculated from the following:    Height as of this encounter: 1.854 m (6' 1\").    Weight as of this encounter: 91.2 kg (201 lb).        "

## 2025-01-13 DIAGNOSIS — E23.0 PANHYPOPITUITARISM: ICD-10-CM

## 2025-01-13 NOTE — TELEPHONE ENCOUNTER
Hi Dr. Tejada,     Patient would like to re-start on Norditropin therapy, last dose was June of 2024 due to cost.     Patient will use  coupon to help with cost since this is the only program that they are eligible for at this time.     Can you please approve refill for [facility]rop[facility])?     It looks like they have only been on dose of 0.5mg daily per chart review (~5.48mg/kg), thus I have pended this dose but please change dose if you feel that patient should be on lower dose given last dose was about 6 months ago.     Thanks,   Xi [facility]), PharmD  Endocrine & Diabetes Medication Therapy Management Pharmacist   [address] [place] [id number]     Contact information:   To schedule a MTM appointment: [phone]  For questions or concerns, please send a [employer] message or call the clinic at [phone].    For more urgent concerns that do not require 694, please call [phone] after hours/weekends and ask to speak with the Endocrinologist on call.

## 2025-01-15 RX ORDER — SOMATROPIN 15 MG/1.5ML
0.5 INJECTION, SOLUTION SUBCUTANEOUS DAILY
Qty: 1.5 ML | Refills: 5 | Status: SHIPPED | OUTPATIENT
Start: 2025-01-15

## 2025-01-20 RX ORDER — LEVOTHYROXINE SODIUM 125 UG/1
125 TABLET ORAL DAILY
Qty: 90 TABLET | Refills: 3 | Status: SHIPPED | OUTPATIENT
Start: 2025-01-20

## 2025-01-20 RX ORDER — TESTOSTERONE CYPIONATE 200 MG/ML
0.25 INJECTION, SOLUTION INTRAMUSCULAR WEEKLY
Qty: 6 ML | Refills: 5 | Status: SHIPPED | OUTPATIENT
Start: 2025-01-20

## 2025-01-20 RX ORDER — HYDROCORTISONE 5 MG/1
5 TABLET ORAL DAILY
Qty: 270 TABLET | Refills: 3 | Status: SHIPPED | OUTPATIENT
Start: 2025-01-20

## 2025-02-12 DIAGNOSIS — E29.1 HYPOGONADISM MALE: ICD-10-CM

## 2025-02-17 RX ORDER — NEEDLES, SAFETY 22GX1 1/2"
NEEDLE, DISPOSABLE MISCELLANEOUS
Qty: 15 EACH | Refills: 3 | Status: SHIPPED | OUTPATIENT
Start: 2025-02-17

## 2025-02-17 NOTE — TELEPHONE ENCOUNTER
" Last Written Prescription:   syringe/needle, disp, (B-D SYRINGE/NEEDLE) 23G X 1\" 3 ML MISC15 each32/11/2024--No  Sig: USE 1 EVERY WEEK      ----------------------  Last Visit Date:  1/8/2025  [facility] Damaris Vuong MD  Endocrinology, Diabetes, and Metabolism    ----------------------            [x]  Refill decision: Medication refilled per  Medication Refill in Ambulatory Care  policy.                Request from pharmacy:  Requested Prescriptions  Pending PrescriptionsDispRefills  B-D SYRINGE/NEEDLE 23G X 1\" 3 ML MISC [Pharmacy Med Name: B-D #[id number] SYR/NDL 3ML 23GX1 LL]  Sig: USE 1 EVERY WEEK        There is no refill protocol information for this order  "

## 2025-02-19 SDOH — HEALTH STABILITY: PHYSICAL HEALTH: ON AVERAGE, HOW MANY MINUTES DO YOU ENGAGE IN EXERCISE AT THIS LEVEL?: 20 MIN

## 2025-02-19 SDOH — HEALTH STABILITY: PHYSICAL HEALTH: ON AVERAGE, HOW MANY DAYS PER WEEK DO YOU ENGAGE IN MODERATE TO STRENUOUS EXERCISE (LIKE A BRISK WALK)?: 1 DAY

## 2025-02-19 ASSESSMENT — SOCIAL DETERMINANTS OF HEALTH (SDOH): HOW OFTEN DO YOU GET TOGETHER WITH FRIENDS OR RELATIVES?: ONCE A WEEK

## 2025-02-20 ENCOUNTER — OFFICE VISIT (OUTPATIENT)
Dept: FAMILY MEDICINE | Facility: CLINIC | Age: 45
End: 2025-02-20
Payer: COMMERCIAL

## 2025-02-20 VITALS
SYSTOLIC BLOOD PRESSURE: 116 MMHG | TEMPERATURE: 98.4 F | HEIGHT: 73 IN | DIASTOLIC BLOOD PRESSURE: 89 MMHG | WEIGHT: 198 LBS | HEART RATE: 70 BPM | OXYGEN SATURATION: 98 % | BODY MASS INDEX: 26.24 KG/M2 | RESPIRATION RATE: 18 BRPM

## 2025-02-20 DIAGNOSIS — Z00.00 ROUTINE GENERAL MEDICAL EXAMINATION AT A HEALTH CARE FACILITY: Primary | ICD-10-CM

## 2025-02-20 DIAGNOSIS — E22.0 ACROMEGALY (H): ICD-10-CM

## 2025-02-20 DIAGNOSIS — D35.2 PITUITARY ADENOMA (H): ICD-10-CM

## 2025-02-20 DIAGNOSIS — N52.01 ERECTILE DYSFUNCTION DUE TO ARTERIAL INSUFFICIENCY: ICD-10-CM

## 2025-02-20 LAB
ANION GAP SERPL CALCULATED.3IONS-SCNC: 12 MMOL/L (ref 7–15)
BUN SERPL-MCNC: 8 MG/DL (ref 6–20)
CALCIUM SERPL-MCNC: 9.6 MG/DL (ref 8.8–10.4)
CHLORIDE SERPL-SCNC: 101 MMOL/L (ref 98–107)
CREAT SERPL-MCNC: 0.83 MG/DL (ref 0.67–1.17)
EGFRCR SERPLBLD CKD-EPI 2021: >90 ML/MIN/1.73M2
GLUCOSE SERPL-MCNC: 101 MG/DL (ref 70–99)
HCO3 SERPL-SCNC: 27 MMOL/L (ref 22–29)
POTASSIUM SERPL-SCNC: 4.6 MMOL/L (ref 3.4–5.3)
SODIUM SERPL-SCNC: 140 MMOL/L (ref 135–145)

## 2025-02-20 RX ORDER — SILDENAFIL 100 MG/1
100 TABLET, FILM COATED ORAL DAILY PRN
Qty: 30 TABLET | Refills: 3 | Status: SHIPPED | OUTPATIENT
Start: 2025-02-20

## 2025-02-20 ASSESSMENT — PAIN SCALES - GENERAL: PAINLEVEL_OUTOF10: NO PAIN (0)

## 2025-02-20 NOTE — NURSING NOTE
Prior to immunization administration, verified patients identity using patient s name and date of birth. Please see Immunization Activity for additional information.     Screening Questionnaire for Adult Immunization    Are you sick today?   No   Do you have allergies to medications, food, a vaccine component or latex?   No   Have you ever had a serious reaction after receiving a vaccination?   No   Do you have a long-term health problem with heart, lung, kidney, or metabolic disease (e.g., diabetes), asthma, a blood disorder, no spleen, complement component deficiency, a cochlear implant, or a spinal fluid leak?  Are you on long-term aspirin therapy?   No   Do you have cancer, leukemia, HIV/AIDS, or any other immune system problem?   No   Do you have a parent, brother, or sister with an immune system problem?   No   In the past 3 months, have you taken medications that affect  your immune system, such as prednisone, other steroids, or anticancer drugs; drugs for the treatment of rheumatoid arthritis, Crohn s disease, or psoriasis; or have you had radiation treatments?   Yes   Have you had a seizure, or a brain or other nervous system problem?   No   During the past year, have you received a transfusion of blood or blood    products, or been given immune (gamma) globulin or antiviral drug?   No   For women: Are you pregnant or is there a chance you could become       pregnant during the next month?   No   Have you received any vaccinations in the past 4 weeks?   No     Immunization questionnaire was positive for at least one answer.  Notified Dr. Hidalgo.      Patient instructed to remain in clinic for 15 minutes afterwards, and to report any adverse reactions.     Screening performed by Lina Moon MA on 2/20/2025 at 2:18 PM.

## 2025-02-20 NOTE — PROGRESS NOTES
"Preventive Care Visit  [facility]  Samra Hidalgo MD, Family Medicine  Feb 20, 2025      Assessment & Plan     (Z00.00) Routine general medical examination at a health care facility  (primary encounter diagnosis)    (N52.01) Erectile dysfunction due to arterial insufficiency  He's used medication in the past, would like to restart  Plan: sildenafil (VIAGRA) 100 MG tablet          (E22.0) Acromegaly (H)  (D35.2) Pituitary adenoma (H)  Stable.  Comment: on medication, followed by Dr. Tejada, endocrinologist once annually.  Plan: Basic metabolic panel  (Ca, Cl, CO2, Creat,         Gluc, K, Na, BUN)          Patient has been advised of split billing requirements and indicates understanding: Yes      Wt Readings from Last 4 Encounters:   02/20/25 89.8 kg (198 lb)   01/08/25 91.2 kg (201 lb)   10/27/23 93.4 kg (206 lb)   04/24/23 92.1 kg (203 lb)       BMI  Estimated body mass index is 26.48 kg/m  as calculated from the following:    Height as of this encounter: 1.842 m (6' 0.5\").    Weight as of this encounter: 89.8 kg (198 lb).   Weight management plan: Discussed healthy diet and exercise guidelines    Counseling  Appropriate preventive services were addressed with this patient via screening, questionnaire, or discussion as appropriate for fall prevention, nutrition, physical activity, Tobacco-use cessation, social engagement, weight loss and cognition.  Checklist reviewing preventive services available has been given to the patient.  Reviewed patient's diet, addressing concerns and/or questions.   He is at risk for lack of exercise and has been provided with information to increase physical activity for the benefit of his well-being.   The patient was instructed to see the dentist every 6 months.   He is at risk for psychosocial distress and has been provided with information to reduce risk.     Vickie Johnson is a 44 year old, presenting for the following:  Physical        " 2/20/2025     1:41 PM   Additional Questions   Roomed by Leslie GUZMAN    Health Care Directive  Patient does not have a Health Care Directive: Discussed advance care planning with patient; information given to patient to review.      2/19/2025   General Health   How would you rate your overall physical health? (!) FAIR   Feel stress (tense, anxious, or unable to sleep) To some extent   (!) STRESS CONCERN      2/19/2025   Nutrition   Three or more servings of calcium each day? Yes   Diet: Regular (no restrictions)   How many servings of fruit and vegetables per day? (!) 2-3   How many sweetened beverages each day? 0-1         2/19/2025   Exercise   Days per week of moderate/strenous exercise 1 day   Average minutes spent exercising at this level 20 min   (!) EXERCISE CONCERN      2/19/2025   Social Factors   Frequency of gathering with friends or relatives Once a week   Worry food won't last until get money to buy more No   Food not last or not have enough money for food? No   Do you have housing? (Housing is defined as stable permanent housing and does not include staying ouside in a car, in a tent, in an abandoned building, in an overnight shelter, or couch-surfing.) Yes   Are you worried about losing your housing? No   Lack of transportation? No   Unable to get utilities (heat,electricity)? No         2/19/2025   Dental   Dentist two times every year? (!) NO            Today's PHQ-9 Score:       12/9/2024     9:42 AM   PHQ-9 SCORE   PHQ-9 Total Score MyChart 2 (Minimal depression)   PHQ-9 Total Score 2        Patient-reported           2/19/2025   Substance Use   Alcohol more than 3/day or more than 7/wk No   Do you use any other substances recreationally? No     Social History     Tobacco Use    Smoking status: Never     Passive exposure: Past (passive exposure as a child)    Smokeless tobacco: Never   Vaping Use    Vaping status: Never Used   Substance Use Topics    Alcohol use: Yes     Comment: Only so  "often    Drug use: No           2/19/2025   STI Screening   New sexual partner(s) since last STI/HIV test? No   ASCVD Risk   The 10-year ASCVD risk score (Nidia AMARO, et al., 2019) is: 1.9%    Values used to calculate the score:      Age: 44 years      Sex: Male      Is Non- : No      Diabetic: No      Tobacco smoker: No      Systolic Blood Pressure: 116 mmHg      Is BP treated: No      HDL Cholesterol: 62 mg/dL      Total Cholesterol: 262 mg/dL        2/19/2025   Contraception/Family Planning   Questions about contraception or family planning No        Reviewed and updated as needed this visit by Provider   Tobacco  Allergies  Meds  Problems  Med Hx  Surg Hx  Fam Hx  Soc   Hx Sexual Activity          Past Medical History:   Diagnosis Date    Asymptomatic LV dysfunction 08/30/2012    Cancer (H)     Colon    Depressive disorder 2011, 2021    Thyroid disease     Acromegaly     Past Surgical History:   Procedure Laterality Date    APPENDECTOMY      COLONOSCOPY      COLONOSCOPY N/A 4/24/2023    Procedure: COLONOSCOPY, WITH POLYPECTOMY AND BIOPSY;  Surgeon: Ovi Lepe DO;  Location: [facility] OR    GI SURGERY  2011    sigmoid colectomy    HEAD & NECK SURGERY  2010    transphenoidal hypophysectomy, repeat 2016         Review of Systems  Constitutional, HEENT, cardiovascular, pulmonary, gi and gu systems are negative, except as otherwise noted.     Objective    Exam  /89 (BP Location: Right arm, Patient Position: Chair, Cuff Size: Adult Regular)   Pulse 70   Temp 98.4  F (36.9  C) (Tympanic)   Resp 18   Ht 1.842 m (6' 0.5\")   Wt 89.8 kg (198 lb)   SpO2 98%   BMI 26.48 kg/m     Estimated body mass index is 26.48 kg/m  as calculated from the following:    Height as of this encounter: 1.842 m (6' 0.5\").    Weight as of this encounter: 89.8 kg (198 lb).    Physical Exam  GENERAL: alert and no distress  NECK: no adenopathy, no asymmetry, masses, or scars  RESP: lungs clear to " auscultation - no rales, rhonchi or wheezes  CV: regular rate and rhythm, normal S1 S2, no S3 or S4, no murmur, click or rub, no peripheral edema  ABDOMEN: soft, nontender, no hepatosplenomegaly, no masses and bowel sounds normal  MS: no gross musculoskeletal defects noted, no edema        Signed Electronically by: Samra Hidalgo MD

## 2025-02-20 NOTE — PATIENT INSTRUCTIONS
Recent Labs   Lab Test 11/10/23  0855 07/25/22  0920   CHOL 262* 260*   HDL 62 81   * 162*   TRIG 81 87     The 10-year ASCVD risk score (Nidia AMARO, et al., 2019) is: 1.9%    Values used to calculate the score:      Age: 44 years      Sex: Male      Is Non- : No      Diabetic: No      Tobacco smoker: No      Systolic Blood Pressure: 116 mmHg      Is BP treated: No      HDL Cholesterol: 62 mg/dL      Total Cholesterol: 262 mg/dL   Patient Education   Preventive Care Advice   This is general advice given by our system to help you stay healthy. However, your care team may have specific advice just for you. Please talk to your care team about your preventive care needs.  Nutrition  Eat 5 or more servings of fruits and vegetables each day.  Try wheat bread, brown rice and whole grain pasta (instead of white bread, rice, and pasta).  Get enough calcium and vitamin D. Check the label on foods and aim for 100% of the RDA (recommended daily allowance).  Lifestyle  Exercise at least 150 minutes each week  (30 minutes a day, 5 days a week).  Do muscle strengthening activities 2 days a week. These help control your weight and prevent disease.  No smoking.  Wear sunscreen to prevent skin cancer.  Have a dental exam and cleaning every 6 months.  Yearly exams  See your health care team every year to talk about:  Any changes in your health.  Any medicines your care team has prescribed.  Preventive care, family planning, and ways to prevent chronic diseases.  Shots (vaccines)   HPV shots (up to age 26), if you've never had them before.  Hepatitis B shots (up to age 59), if you've never had them before.  COVID-19 shot: Get this shot when it's due.  Flu shot: Get a flu shot every year.  Tetanus shot: Get a tetanus shot every 10 years.  Pneumococcal, hepatitis A, and RSV shots: Ask your care team if you need these based on your risk.  Shingles shot (for age 50 and up)  General health tests  Diabetes  screening:  Starting at age 35, Get screened for diabetes at least every 3 years.  If you are younger than age 35, ask your care team if you should be screened for diabetes.  Cholesterol test: At age 39, start having a cholesterol test every 5 years, or more often if advised.  Bone density scan (DEXA): At age 50, ask your care team if you should have this scan for osteoporosis (brittle bones).  Hepatitis C: Get tested at least once in your life.  STIs (sexually transmitted infections)  Before age 24: Ask your care team if you should be screened for STIs.  After age 24: Get screened for STIs if you're at risk. You are at risk for STIs (including HIV) if:  You are sexually active with more than one person.  You don't use condoms every time.  You or a partner was diagnosed with a sexually transmitted infection.  If you are at risk for HIV, ask about PrEP medicine to prevent HIV.  Get tested for HIV at least once in your life, whether you are at risk for HIV or not.  Cancer screening tests  Cervical cancer screening: If you have a cervix, begin getting regular cervical cancer screening tests starting at age 21.  Breast cancer scan (mammogram): If you've ever had breasts, begin having regular mammograms starting at age 40. This is a scan to check for breast cancer.  Colon cancer screening: It is important to start screening for colon cancer at age 45.  Have a colonoscopy test every 10 years (or more often if you're at risk) Or, ask your provider about stool tests like a FIT test every year or Cologuard test every 3 years.  To learn more about your testing options, visit:   .  For help making a decision, visit:   https://bit.ly/ti57895.  Prostate cancer screening test: If you have a prostate, ask your care team if a prostate cancer screening test (PSA) at age 55 is right for you.  Lung cancer screening: If you are a current or former smoker ages 50 to 80, ask your care team if ongoing lung cancer screenings are right for  you.  For informational purposes only. Not to replace the advice of your health care provider. Copyright   2023 [facility]. All rights reserved. Clinically reviewed by the [facility] Transitions Program. [employer] 601014 - REV 01/24.  Learning About Stress  What is stress?     Stress is your body's response to a hard situation. Your body can have a physical, emotional, or mental response. Stress is a fact of life for most people, and it affects everyone differently. What causes stress for you may not be stressful for someone else.  A lot of things can cause stress. You may feel stress when you go on a job interview, take a test, or run a race. This kind of short-term stress is normal and even useful. It can help you if you need to work hard or react quickly. For example, stress can help you finish an important job on time.  Long-term stress is caused by ongoing stressful situations or events. Examples of long-term stress include long-term health problems, ongoing problems at work, or conflicts in your family. Long-term stress can harm your health.  How does stress affect your health?  When you are stressed, your body responds as though you are in danger. It makes hormones that speed up your heart, make you breathe faster, and give you a burst of energy. This is called the fight-or-flight stress response. If the stress is over quickly, your body goes back to normal and no harm is done.  But if stress happens too often or lasts too long, it can have bad effects. Long-term stress can make you more likely to get sick, and it can make symptoms of some diseases worse. If you tense up when you are stressed, you may develop neck, shoulder, or low back pain. Stress is linked to high blood pressure and heart disease.  Stress also harms your emotional health. It can make you hurtado, tense, or depressed. Your relationships may suffer, and you may not do well at work or school.  What can you do to manage  stress?  You can try these things to help manage stress:   Do something active. Exercise or activity can help reduce stress. Walking is a great way to get started. Even everyday activities such as housecleaning or yard work can help.  Try yoga or carmen chi. These techniques combine exercise and meditation. You may need some training at first to learn them.  Do something you enjoy. For example, listen to music or go to a movie. Practice your hobby or do volunteer work.  Meditate. This can help you relax, because you are not worrying about what happened before or what may happen in the future.  Do guided imagery. Imagine yourself in any setting that helps you feel calm. You can use online videos, books, or a teacher to guide you.  Do breathing exercises. For example:  From a standing position, bend forward from the waist with your knees slightly bent. Let your arms dangle close to the floor.  Breathe in slowly and deeply as you return to a standing position. Roll up slowly and lift your head last.  Hold your breath for just a few seconds in the standing position.  Breathe out slowly and bend forward from the waist.  Let your feelings out. Talk, laugh, cry, and express anger when you need to. Talking with supportive friends or family, a counselor, or a rosalia leader about your feelings is a healthy way to relieve stress. Avoid discussing your feelings with people who make you feel worse.  Write. It may help to write about things that are bothering you. This helps you find out how much stress you feel and what is causing it. When you know this, you can find better ways to cope.  What can you do to prevent stress?  You might try some of these things to help prevent stress:  Manage your time. This helps you find time to do the things you want and need to do.  Get enough sleep. Your body recovers from the stresses of the day while you are sleeping.  Get support. Your family, friends, and community can make a difference in how  "you experience stress.  Limit your news feed. Avoid or limit time on social media or news that may make you feel stressed.  Do something active. Exercise or activity can help reduce stress. Walking is a great way to get started.  Where can you learn more?  Go to https://www.[employer].net/patiented  Enter N032 in the search box to learn more about \"Learning About Stress.\"  Current as of: October 24, 2023  Content Version: 14.3    2024 [employer].   Care instructions adapted under license by your healthcare professional. If you have questions about a medical condition or this instruction, always ask your healthcare professional. [employer] disclaims any warranty or liability for your use of this information.       "

## 2025-02-24 ENCOUNTER — MYC MEDICAL ADVICE (OUTPATIENT)
Dept: ENDOCRINOLOGY | Facility: CLINIC | Age: 45
End: 2025-02-24
Payer: COMMERCIAL

## 2025-02-24 DIAGNOSIS — E23.0 PANHYPOPITUITARISM: ICD-10-CM

## 2025-02-26 RX ORDER — HYDROCORTISONE 5 MG/1
15 TABLET ORAL DAILY
Qty: 270 TABLET | Refills: 3 | Status: SHIPPED | OUTPATIENT
Start: 2025-02-26

## 2025-02-26 NOTE — TELEPHONE ENCOUNTER
"Karol concern: \"Hello I tried to fill my Hydrocortisone but the pharmacy said that the quantity entered is wrong.  It should be 15 mg (3 tablets) per day and it is entered for one tablet per day.  I will run out soon.  Can you please send a new prescription to the [facility] at [address]?  Thank you.\"       Last Written Prescription:     hydrocortisone (CORTEF) 5 MG tablet 270 tablet 3 1/20/2025 -- No   Sig - Route: Take 1 tablet (5 mg) by mouth daily. - Oral   Sent to pharmacy as: Hydrocortisone 5 MG Oral Tablet (CORTEF)   Class: E-Prescribe   Notes to Pharmacy: Takes 3 tablets daily     ----------------------  Last Visit Date: 1/8/25  Future Visit Date: None  ----------------------    [x]  Refill decision: Medication unable to be refilled by RN due to: Verification - order discrepancy, clarification needed, Sig modification needed  Last ordered  hydrocortisone (CORTEF) 5 MG tablet  Sig - Route: Take 1 tablet (5 mg) by mouth daily. - Oral   Sent to pharmacy as: Hydrocortisone 5 MG Oral Tablet (CORTEF)   Class: E-Prescribe   Notes to Pharmacy: Takes 3 tablets daily     Per 1/8/25 note / Dr Tejada clinic visit:\"# Secondary adrenal insufficiency  Since patient feels well on Dexamethasone 0.125 mg daily and 15 mg daily AM hydrocortisone.     - Sol-cotef PRN as usual\"  Pended for signature hydrocortisone (CORTEF) 5 MG tablet Sig: Take 3 tablets (15 mg) by mouth daily.     Request from pharmacy:  Requested Prescriptions   Pending Prescriptions Disp Refills    hydrocortisone (CORTEF) 5 MG tablet 270 tablet 3     Sig: Take 3 tablets (15 mg) by mouth daily.       There is no refill protocol information for this order          "

## 2025-03-19 ENCOUNTER — OFFICE VISIT (OUTPATIENT)
Dept: URGENT CARE | Facility: URGENT CARE | Age: 45
End: 2025-03-19
Payer: COMMERCIAL

## 2025-03-19 VITALS
DIASTOLIC BLOOD PRESSURE: 77 MMHG | OXYGEN SATURATION: 97 % | HEART RATE: 67 BPM | TEMPERATURE: 98.3 F | HEIGHT: 73 IN | SYSTOLIC BLOOD PRESSURE: 115 MMHG | RESPIRATION RATE: 16 BRPM | WEIGHT: 204 LBS | BODY MASS INDEX: 27.04 KG/M2

## 2025-03-19 DIAGNOSIS — Z20.828 MONO EXPOSURE: ICD-10-CM

## 2025-03-19 DIAGNOSIS — R07.0 THROAT PAIN: ICD-10-CM

## 2025-03-19 DIAGNOSIS — Z20.818 STREPTOCOCCUS EXPOSURE: Primary | ICD-10-CM

## 2025-03-19 LAB
DEPRECATED S PYO AG THROAT QL EIA: NEGATIVE
S PYO DNA THROAT QL NAA+PROBE: NOT DETECTED

## 2025-03-19 PROCEDURE — 3074F SYST BP LT 130 MM HG: CPT | Performed by: INTERNAL MEDICINE

## 2025-03-19 PROCEDURE — 99214 OFFICE O/P EST MOD 30 MIN: CPT | Performed by: INTERNAL MEDICINE

## 2025-03-19 PROCEDURE — 3078F DIAST BP <80 MM HG: CPT | Performed by: INTERNAL MEDICINE

## 2025-03-19 RX ORDER — CEPHALEXIN 500 MG/1
500 CAPSULE ORAL 2 TIMES DAILY
Qty: 20 CAPSULE | Refills: 0 | Status: SHIPPED | OUTPATIENT
Start: 2025-03-19 | End: 2025-03-29

## 2025-03-19 RX ORDER — AZITHROMYCIN 250 MG/1
TABLET, FILM COATED ORAL
Qty: 6 TABLET | Refills: 0 | Status: SHIPPED | OUTPATIENT
Start: 2025-03-19

## 2025-03-19 NOTE — PROGRESS NOTES
"ASSESSMENT AND PLAN:      ICD-10-CM    1. Streptococcus exposure  Z20.818 azithromycin (ZITHROMAX) 250 MG tablet     cephALEXin (KEFLEX) 500 MG capsule      2. Throat pain  R07.0 Streptococcus A Rapid Screen w/Reflex to PCR - Clinic Collect     Group A Streptococcus PCR Throat Swab     azithromycin (ZITHROMAX) 250 MG tablet      3. Mono exposure  Z20.828           Patient Instructions   With exposure with Mono & strep,   would chose not choose amoxicillin due to possible rash if you have early mono.    Combined decision-making, Alex would like to wait for strep PCR & if positive, will fill  prescription for keflex or if symptoms worse.    Discussed could be too early to test for mono as no lymph node or white spots or fevers,  Elects to wait with concerns for cost.        Johanny Dallas MD  [facility]    Subjective     Ivan D Welander is a 44 year old who presents for Patient presents with:  Urgent Care: Wife has mono and daughter has strep   Pharyngitis: X2 days of sore throat   Generalized Body Aches    an established patient of [facility].      Onset of symptoms was 2 day(s) ago.  Current and Associated symptoms: sore throat and body aches  Treatment measures tried include None tried.  Predisposing factors include ill contact: Family member/daughter with strep and exposure to strep.      Review of Systems        Objective    /77   Pulse 67   Temp 98.3  F (36.8  C) (Oral)   Resp 16   Ht 1.844 m (6' 0.6\")   Wt 92.5 kg (204 lb)   SpO2 97%   BMI 27.21 kg/m    Physical Exam  Vitals reviewed.   Constitutional:       Appearance: Normal appearance. He is not ill-appearing.   HENT:      Left Ear: Tympanic membrane normal.      Mouth/Throat:      Pharynx: Posterior oropharyngeal erythema present. No oropharyngeal exudate.      Comments: PND  Cardiovascular:      Rate and Rhythm: Normal rate and regular rhythm.      Pulses: Normal pulses.      Heart sounds: Normal heart sounds. "   Pulmonary:      Effort: Pulmonary effort is normal.      Breath sounds: Normal breath sounds.   Musculoskeletal:      Cervical back: No tenderness.   Lymphadenopathy:      Cervical: No cervical adenopathy.   Neurological:      Mental Status: He is alert.            Results for orders placed or performed in visit on 03/19/25 (from the past 24 hours)   Streptococcus A Rapid Screen w/Reflex to PCR - Clinic Collect    Specimen: Throat; Swab   Result Value Ref Range    Group A Strep antigen Negative Negative

## 2025-03-19 NOTE — PATIENT INSTRUCTIONS
With exposure with Mono & strep,   would chose not choose amoxicillin due to possible rash if you have early mono.    Combined decision-making, Alex would like to wait for strep PCR & if positive, will fill  prescription for keflex or if symptoms worse.    Discussed could be too early to test for mono as no lymph node or white spots or fevers,  Elects to wait with concerns for cost.

## 2025-03-25 ENCOUNTER — TELEPHONE (OUTPATIENT)
Dept: ENDOCRINOLOGY | Facility: CLINIC | Age: 45
End: 2025-03-25
Payer: COMMERCIAL

## 2025-03-25 DIAGNOSIS — E23.0 HYPOPITUITARISM: Primary | ICD-10-CM

## 2025-03-25 RX ORDER — SOMAPACITAN-BECO 3.3 MG/ML
1.5 INJECTION, SOLUTION SUBCUTANEOUS WEEKLY
Qty: 3 ML | Refills: 3 | OUTPATIENT
Start: 2025-03-25

## 2025-03-25 NOTE — TELEPHONE ENCOUNTER
"See mycharts dated 03/05/2025 with MD and 03/17/2025 with MTM pharmacist.     Starting dose 1.5mg week, indication E23.0 5mg/1.5ml per 23 day    Pt has [employer] ran test claim 03/17/2025     Comes up premium drug exclueded, drug not covered, plan exclusion, non matched pharmacy number.     PA Initiation    Medication: SOGROYA 5 MG/1.5ML SC SOPN  Insurance Company: [employer] ([facility]) - Phone [phone] Fax [phone]  Pharmacy Filling the Rx:    Filling Pharmacy Phone:    Filling Pharmacy Fax:    Start Date: 3/25/2025         Pt has been on Norditropin and Nutropin, only wants to try sogroya due to \"I think I would have better compliance with my dosing with a once weekly injection than a daily injection \"      Pt was not peds diagnosed. He was diagnosed Acromegaly in 2007, during the regular physical check up in [facility], and screened and diagnosed. 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       Pt is NOT GHD, pt has long term panhypopituitarism       "

## 2025-03-26 NOTE — TELEPHONE ENCOUNTER
Hi Dr. Tejada,     In order for Sogroya coverage, patient would have to meet either criteria A:       Or criteria B:       Part of criteria A: can not find any history of growth hormone stimulation test on file.     Part of criteria B: Prolactin level was WNL when last obtained, no adrenocorticotropic hormone (ACTH)  result that can be found on patient's file.       Can you please advise what you would recommend patient to obtain.     My Chart message sent to patient on update.     Thanks,   Xi Camp[facility]), PharmD  Endocrine & Diabetes Medication Therapy Management Pharmacist   [address] [place] [phone]     Contact information:   To schedule a MTM appointment: [phone]  For questions or concerns, please send a [employer] message or call the clinic at [phone].    For more urgent concerns that do not require 823, please call [phone] after hours/weekends and ask to speak with the Endocrinologist on call.

## 2025-03-26 NOTE — TELEPHONE ENCOUNTER
PRIOR AUTHORIZATION DENIED    Medication: SOGROYA 5 MG/1.5ML SC SOPN  Insurance Company: [employer] ([facility]) - Phone [phone] Fax [phone]  Denial Date: 3/25/2025  Denial Reason(s):   Appeal Information:   Patient Notified:

## 2025-03-30 ENCOUNTER — MYC REFILL (OUTPATIENT)
Dept: ENDOCRINOLOGY | Facility: CLINIC | Age: 45
End: 2025-03-30
Payer: COMMERCIAL

## 2025-03-30 DIAGNOSIS — E23.0 HYPOPITUITARISM: ICD-10-CM

## 2025-03-31 RX ORDER — DEXAMETHASONE 1 MG
TABLET ORAL
Qty: 105 TABLET | Refills: 3 | Status: SHIPPED | OUTPATIENT
Start: 2025-03-31

## 2025-04-09 NOTE — TELEPHONE ENCOUNTER
Mtm sent mychart to patient to see if can find GH stim test results (see mychart dated 03/26/2025)

## 2025-04-17 NOTE — TELEPHONE ENCOUNTER
Medication Appeal Initiation    Medication: SOGROYA 5 MG/1.5ML SC SOPN  Appeal Start Date:  4/17/2025  Insurance Company: [employer]  Insurance Phone: [phone]  Insurance Fax: [phone]  Comments:        Faxed pa denial, chart notes and additional information for reconsideration to [employer] [phone] 04/17/2025 1043am cover plus 44 pages

## 2025-04-17 NOTE — TELEPHONE ENCOUNTER
Eddie Stanton,     Patient was able to find results of Arginine-GHRH stimulation test from 2017.     Results show that patient meets ACCE/ACE diagnostic cut off of 8 micrograms/L or less for patient with BMI between 25 and 30kg/m2 thus supporting diagnosis of GHD.     See My chart message from 4/16 for lab results that patient has sent over.     Thanks,   Xi [facility]), PharmD  Endocrine & Diabetes Medication Therapy Management Pharmacist   [address] [place] [id number]     Contact information:   To schedule a MTM appointment: [phone]  For questions or concerns, please send a [employer] message or call the clinic at [phone].    For more urgent concerns that do not require 309, please call [phone] after hours/weekends and ask to speak with the Endocrinologist on call.

## 2025-04-21 NOTE — TELEPHONE ENCOUNTER
Smn completed to best of liaison ability. Emailed to provider for final completion 04/21/2025 043gm

## 2025-04-21 NOTE — TELEPHONE ENCOUNTER
MEDICATION APPEAL APPROVED    Medication: SOGROYA 5 MG/1.5ML SC SOPN  Authorization Effective Date: 4/18/2025  Authorization Expiration Date: 4/18/2026  Approved Dose/Quantity: 1.5ml per 23 day  Reference #: [id number]   Appeal Insurance Company: [employer]  Expected CoPay: $    unable to determine   CoPay Card Available: Yes  Financial Assistance Needed:   Filling Pharmacy: [facility] - [address]  Patient Notified: yes via [employer]  Comments:

## 2025-04-23 ENCOUNTER — TELEPHONE (OUTPATIENT)
Dept: ENDOCRINOLOGY | Facility: CLINIC | Age: 45
End: 2025-04-23
Payer: COMMERCIAL

## 2025-04-23 NOTE — TELEPHONE ENCOUNTER
Left Voicemail (1st Attempt) and Sent Mychart (1st Attempt) for the patient to call back and schedule the following:    Appointment type: RETURN ENDOCRINE  Provider: Damaris Tejada MD  Return date: 1/2026  Specialty phone number: [phone]

## 2025-04-23 NOTE — TELEPHONE ENCOUNTER
Received smn from provider.   Faxed to [facility] along with pa approval: 04/23/2025 754am cover plus 7 pages   Faxed to [facility] along with pa approval: 04/23/2025 757am cover plus 7 pages

## 2025-05-27 ENCOUNTER — TRANSFERRED RECORDS (OUTPATIENT)
Dept: HEALTH INFORMATION MANAGEMENT | Facility: CLINIC | Age: 45
End: 2025-05-27
Payer: COMMERCIAL

## 2025-06-26 ENCOUNTER — E-VISIT (OUTPATIENT)
Dept: FAMILY MEDICINE | Facility: CLINIC | Age: 45
End: 2025-06-26
Payer: COMMERCIAL

## 2025-06-26 DIAGNOSIS — R07.0 THROAT PAIN: ICD-10-CM

## 2025-06-26 DIAGNOSIS — E23.0 HYPOPITUITARISM: Primary | ICD-10-CM

## 2025-06-26 DIAGNOSIS — Z20.818 STREPTOCOCCUS EXPOSURE: ICD-10-CM

## 2025-06-26 DIAGNOSIS — J15.7 PNEUMONIA OF BOTH LUNGS DUE TO MYCOPLASMA PNEUMONIAE, UNSPECIFIED PART OF LUNG: Primary | ICD-10-CM

## 2025-06-26 RX ORDER — HYDROCORTISONE SODIUM SUCCINATE 100 MG/2ML
100 INJECTION INTRAMUSCULAR; INTRAVENOUS ONCE
COMMUNITY
End: 2025-06-26

## 2025-06-26 RX ORDER — AMOXICILLIN 875 MG/1
875 TABLET, COATED ORAL 3 TIMES DAILY
Qty: 21 TABLET | Refills: 0 | Status: SHIPPED | OUTPATIENT
Start: 2025-06-26 | End: 2025-07-03

## 2025-06-26 RX ORDER — AZITHROMYCIN 250 MG/1
TABLET, FILM COATED ORAL
Qty: 6 TABLET | Refills: 0 | Status: SHIPPED | OUTPATIENT
Start: 2025-06-26

## 2025-06-26 RX ORDER — ALPRAZOLAM 0.5 MG
0.5 TABLET ORAL
COMMUNITY
Start: 2025-05-16

## 2025-06-26 RX ORDER — HYDROCORTISONE SODIUM SUCCINATE 100 MG/2ML
100 INJECTION INTRAMUSCULAR; INTRAVENOUS
Qty: 2 ML | Refills: 0 | Status: SHIPPED | OUTPATIENT
Start: 2025-06-26

## 2025-06-26 RX ORDER — CEPHALEXIN 500 MG/1
500 CAPSULE ORAL 2 TIMES DAILY
COMMUNITY

## 2025-06-26 NOTE — PATIENT INSTRUCTIONS
I'm so sorry you're sick! I'll send in the 2 antibiotics to cover the presumed mycoplasma pneumonia, I hope it helps soon and you start feeling better.       View your full visit summary for details by clicking on the link below. Your pharmacist will able to address any questions you may have about the medication.     If you're not feeling better within 5-7 days, please schedule an appointment.  You can schedule an appointment right here in [facility], or call [phone]  If the visit is for the same symptoms as your eVisit, we'll refund the cost of your eVisit if seen within seven days.      If you'd like an office visit sooner than you can get in for an in person appointment, you can schedule a virtual appointment. I have virtual appointments on Tuesdays, and usually you can get in within 1-2 weeks. Virtual appointments cost the same as in person appointments.       Sincerely,  Dr. Samra Hidalgo MD  6/26/2025

## 2025-06-26 NOTE — TELEPHONE ENCOUNTER
Provider E-Visit time total (minutes): 5 minutes    ASSESSMENT / PLAN:  (J15.7) Pneumonia of both lungs due to Mycoplasma pneumoniae, unspecified part of lung  (primary encounter diagnosis)  ASSESSMENT / PLAN:  \  Plan: azithromycin (ZITHROMAX) 250 MG tablet,         amoxicillin (AMOXIL) 875 MG tablet

## 2026-04-03 ENCOUNTER — TELEPHONE (OUTPATIENT)
Dept: ENDOCRINOLOGY | Facility: CLINIC | Age: 46
End: 2026-04-03
Payer: COMMERCIAL

## (undated) DEVICE — ENDO SNARE EXACTO COLD 9MM LOOP 2.4MMX230CM 00711115

## (undated) DEVICE — SPECIMEN CONTAINER 3OZ W/FORMALIN 59901

## (undated) DEVICE — KIT ENDO TURNOVER/PROCEDURE CARRY-ON 101822

## (undated) DEVICE — ENDO TRAP POLYP E-TRAP 00711099

## (undated) DEVICE — SUCTION MANIFOLD NEPTUNE 2 SYS 1 PORT 702-025-000

## (undated) DEVICE — GOWN IMPERVIOUS 2XL BLUE

## (undated) DEVICE — TUBING SUCTION MEDI-VAC 1/4"X20' N620A

## (undated) DEVICE — SNARE CAPIVATOR ROUND COLD SNR BX10 M00561101

## (undated) DEVICE — SOL WATER IRRIG 500ML BOTTLE 2F7113